# Patient Record
Sex: FEMALE | Race: WHITE | NOT HISPANIC OR LATINO | Employment: FULL TIME | URBAN - METROPOLITAN AREA
[De-identification: names, ages, dates, MRNs, and addresses within clinical notes are randomized per-mention and may not be internally consistent; named-entity substitution may affect disease eponyms.]

---

## 2017-11-28 ENCOUNTER — HOSPITAL ENCOUNTER (OUTPATIENT)
Dept: LAB | Facility: HOSPITAL | Age: 52
Discharge: HOME | End: 2017-11-28
Attending: NURSE PRACTITIONER | Admitting: NURSE PRACTITIONER

## 2017-11-28 LAB
ALBUMIN SERPL-MCNC: 4.2 G/DL (ref 3.5–4.8)
ALBUMIN/GLOB SERPL: 1.4 {RATIO} (ref 1–1.7)
ALP SERPL-CCNC: 87 IU/L (ref 32–91)
ALT SERPL-CCNC: 11 IU/L (ref 14–54)
ANION GAP SERPL CALC-SCNC: 11 MMOL/L (ref 10–20)
AST SERPL-CCNC: 15 IU/L (ref 15–41)
BASOPHILS # BLD AUTO: 0 10*3/UL (ref 0–0.2)
BASOPHILS NFR BLD AUTO: 1 % (ref 0–2)
BILIRUB SERPL-MCNC: 0.6 MG/DL (ref 0.3–1.2)
BUN SERPL-MCNC: 11 MG/DL (ref 8–20)
BUN/CREAT SERPL: 18.3 (ref 5.4–26.2)
CALCIUM SERPL-MCNC: 9.4 MG/DL (ref 8.9–10.3)
CHLORIDE SERPL-SCNC: 105 MMOL/L (ref 101–111)
CONV CO2: 29 MMOL/L (ref 22–32)
CONV TOTAL PROTEIN: 7.3 G/DL (ref 6.1–7.9)
CREAT UR-MCNC: 0.6 MG/DL (ref 0.4–1)
D DIMER PPP FEU-MCNC: 0.46 MG/L FEU (ref 0.17–0.59)
DIFFERENTIAL METHOD BLD: (no result)
EOSINOPHIL # BLD AUTO: 0.2 10*3/UL (ref 0–0.3)
EOSINOPHIL # BLD AUTO: 3 % (ref 0–3)
ERYTHROCYTE [DISTWIDTH] IN BLOOD BY AUTOMATED COUNT: 12.5 % (ref 11.5–14.5)
GLOBULIN UR ELPH-MCNC: 3.1 G/DL (ref 2.5–3.8)
GLUCOSE SERPL-MCNC: 99 MG/DL (ref 65–99)
HCT VFR BLD AUTO: 38.5 % (ref 35–49)
HGB BLD-MCNC: 13.2 G/DL (ref 12–15)
LYMPHOCYTES # BLD AUTO: 1.9 10*3/UL (ref 0.8–4.8)
LYMPHOCYTES NFR BLD AUTO: 26 % (ref 18–42)
MCH RBC QN AUTO: 30.8 PG (ref 26–32)
MCHC RBC AUTO-ENTMCNC: 34.2 G/DL (ref 32–36)
MCV RBC AUTO: 90.1 FL (ref 80–94)
MONOCYTES # BLD AUTO: 0.6 10*3/UL (ref 0.1–1.3)
MONOCYTES NFR BLD AUTO: 8 % (ref 2–11)
NEUTROPHILS # BLD AUTO: 4.6 10*3/UL (ref 2.3–8.6)
NEUTROPHILS NFR BLD AUTO: 62 % (ref 50–75)
NRBC BLD AUTO-RTO: 0 /100{WBCS}
NRBC/RBC NFR BLD MANUAL: 0 10*3/UL
PLATELET # BLD AUTO: 211 10*3/UL (ref 150–450)
PMV BLD AUTO: 7.8 FL (ref 7.4–10.4)
POTASSIUM SERPL-SCNC: 4 MMOL/L (ref 3.6–5.1)
RBC # BLD AUTO: 4.27 10*6/UL (ref 4–5.4)
SODIUM SERPL-SCNC: 141 MMOL/L (ref 136–144)
WBC # BLD AUTO: 7.3 10*3/UL (ref 4.5–11.5)

## 2018-10-16 ENCOUNTER — HOSPITAL ENCOUNTER (OUTPATIENT)
Dept: MAMMOGRAPHY | Facility: HOSPITAL | Age: 53
Discharge: HOME | End: 2018-10-16
Attending: NURSE PRACTITIONER | Admitting: NURSE PRACTITIONER

## 2018-11-02 ENCOUNTER — HOSPITAL ENCOUNTER (OUTPATIENT)
Dept: OTHER | Facility: HOSPITAL | Age: 53
Discharge: HOME | End: 2018-11-02
Attending: NURSE PRACTITIONER | Admitting: NURSE PRACTITIONER

## 2018-11-12 ENCOUNTER — HOSPITAL ENCOUNTER (OUTPATIENT)
Dept: MAMMOGRAPHY | Facility: HOSPITAL | Age: 53
Discharge: HOME | End: 2018-11-12
Attending: NURSE PRACTITIONER | Admitting: NURSE PRACTITIONER

## 2019-09-19 ENCOUNTER — TRANSCRIBE ORDERS (OUTPATIENT)
Dept: ADMINISTRATIVE | Facility: HOSPITAL | Age: 54
End: 2019-09-19

## 2019-09-19 DIAGNOSIS — R06.02 SHORTNESS OF BREATH: Primary | ICD-10-CM

## 2019-09-19 DIAGNOSIS — Z12.39 SCREENING BREAST EXAMINATION: Primary | ICD-10-CM

## 2019-10-16 ENCOUNTER — HOSPITAL ENCOUNTER (OUTPATIENT)
Dept: MAMMOGRAPHY | Facility: HOSPITAL | Age: 54
Discharge: HOME | End: 2019-10-16
Admitting: NURSE PRACTITIONER

## 2019-10-16 DIAGNOSIS — Z12.39 SCREENING BREAST EXAMINATION: ICD-10-CM

## 2019-10-16 PROCEDURE — 77067 SCR MAMMO BI INCL CAD: CPT

## 2019-10-16 PROCEDURE — 77063 BREAST TOMOSYNTHESIS BI: CPT

## 2020-09-16 NOTE — PROGRESS NOTES
Hematology/Oncology Outpatient Consultation    Patient name: Angy Davies  : 1965  MRN: [id number]  Primary Care Physician: Germán Spencer FNP  Referring Physician: Germán Spencer FNP  Reason For Consult:     Chief Complaint   Patient presents with   • Follow-up     consultation for breast cancer       History of Present Illness:    This a 55-year-old female who in 2018 was found to have stage I invasive ductal carcinoma of the right breast.  There underwent right lumpectomy followed by sentinel lymph node biopsy at [facility] on 2018.  Pathology showed grade 1 tubular carcinoma measuring 6.5 mm associated with some DCIS.  Margins were negative.  And lymph nodes were also negative pathologic stage was pT1b N0 M0.  Patient was then placed on adjuvant tamoxifen following radiation treatment.  She has been under the care of Dr. House.  Patient still that she developed progressive memory issues while on tamoxifen and had requested to have her treatments switched.  Review of Dr. House's note suggest that the patient did have hormonal levels for estradiol and FSH FSH was in the postmenopausal range but estradiol was still within perimenopausal range.  She was asked to continue tamoxifen with repeat hormonal levels 6 to 12 months.  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  She states she does not want to lose her job as she is fearful that tamoxifen effects will continue to be a problem.  She has been on tamoxifen since .  Her last mammogram was 2019.  She denies any breast lumps, nipple discharge or skin discoloration.  2/15/2019 she had a DEXA scan which showed osteopenia and patient is currently on calcium with vitamin D.  She also had intermittent abnormalities in her thyroid function testing.  Had a B12 level which was normal at 693    Past Medical History:   Diagnosis Date   • Breast cancer (CMS/HCC)    • Hx of  radiation therapy        Past Surgical History:   Procedure Laterality Date   • BREAST LUMPECTOMY     • HYSTERECTOMY           Current Outpatient Medications:   •  cyclobenzaprine (FLEXERIL) 10 MG tablet, TAKE ONE TABLET 2 TIMES A DAY AS NEEDED, Disp: , Rfl:   •  ferrous sulfate 324 (65 Fe) MG tablet delayed-release EC tablet, Take 324 mg by mouth Daily With Breakfast., Disp: , Rfl:   •  furosemide (LASIX) 40 MG tablet, Take 40 mg by mouth Daily., Disp: , Rfl:   •  lisinopril-hydrochlorothiazide (PRINZIDE,ZESTORETIC) 10-12.5 MG per tablet, Take 1 tablet by mouth Daily., Disp: , Rfl:   •  methylPREDNISolone (MEDROL) 4 MG dose pack, TAKE 6 TABLETS ON DAY 1 AS DIRECTED ON PACKAGE AND DECREASE BY 1 TAB EACH DAY FOR A TOTAL OF 6 DAYS, Disp: , Rfl:   •  naproxen (NAPROSYN) 500 MG tablet, Take 500 mg by mouth 2 (Two) Times a Day With Meals., Disp: , Rfl:   •  potassium chloride ER (K-TAB) 20 MEQ tablet controlled-release ER tablet, Take 20 mEq by mouth Daily. with food, Disp: , Rfl:   •  Specialty Vitamins Products (Menopause Relief) tablet, MENOPAUSE RELIEF ORAL TABLET, Disp: , Rfl:   •  tamoxifen (NOLVADEX) 20 MG chemo tablet, Take 20 mg by mouth Daily., Disp: , Rfl:   •  Viberzi 75 MG tablet, Take 1 tablet by mouth 2 (Two) Times a Day., Disp: , Rfl:     Not on File      There is no immunization history on file for this patient.    Family History   Problem Relation Age of Onset   • Breast cancer Maternal Grandmother        Cancer-related family history includes Breast cancer in her maternal grandmother.    Social History     Tobacco Use   • Smoking status: Former Smoker     Years: 2.00   • Smokeless tobacco: Never Used   Substance Use Topics   • Alcohol use: Yes     Comment: rarely   • Drug use: Never       ROS:    Review of Systems   Constitutional: Negative for chills and fever.   HENT: Negative for ear pain, mouth sores, nosebleeds and sore throat.    Eyes: Negative for photophobia and visual disturbance.  "  Respiratory: Negative for wheezing and stridor.    Cardiovascular: Negative for chest pain and palpitations.   Gastrointestinal: Negative for abdominal pain, diarrhea, nausea and vomiting.   Endocrine: Negative for cold intolerance and heat intolerance.   Genitourinary: Negative for dysuria and hematuria.   Musculoskeletal: Negative for joint swelling and neck stiffness.   Skin: Negative for color change and rash.   Neurological: Negative for seizures and syncope.   Hematological: Negative for adenopathy.        No obvious bleeding   Psychiatric/Behavioral: Negative for agitation, confusion and hallucinations.       Objective:    Vitals:    09/17/20 1340   BP: 114/81   Pulse: 84   Resp: 16   Temp: 97.3 °F (36.3 °C)   Weight: 79.4 kg (175 lb)   Height: 165.1 cm (65\")   PainSc:   6   PainLoc: Back     Body mass index is 29.12 kg/m².    ECOG  (0) Fully active, able to carry on all predisease performance without restriction    Physical Exam:  Physical Exam  Vitals signs and nursing note reviewed.   Constitutional:       General: She is not in acute distress.     Appearance: She is not diaphoretic.   HENT:      Head: Normocephalic and atraumatic.   Eyes:      General: No scleral icterus.        Right eye: No discharge.         Left eye: No discharge.      Conjunctiva/sclera: Conjunctivae normal.   Neck:      Musculoskeletal: Normal range of motion and neck supple.      Thyroid: No thyromegaly.   Cardiovascular:      Rate and Rhythm: Normal rate and regular rhythm.      Heart sounds: Normal heart sounds. No friction rub. No gallop.    Pulmonary:      Effort: Pulmonary effort is normal. No respiratory distress.      Breath sounds: No stridor. No wheezing.   Abdominal:      General: Bowel sounds are normal.      Palpations: Abdomen is soft. There is no mass.      Tenderness: There is no abdominal tenderness. There is no guarding or rebound.   Musculoskeletal: Normal range of motion.         General: No tenderness. "   Lymphadenopathy:      Cervical: No cervical adenopathy.   Skin:     General: Skin is warm.      Findings: No erythema or rash.   Neurological:      Mental Status: She is alert and oriented to person, place, and time.      Motor: No abnormal muscle tone.   Psychiatric:         Behavior: Behavior normal.         RECENT LABS  WBC   Date Value Ref Range Status   09/17/2020 7.06 3.40 - 10.80 10*3/mm3 Final     RBC   Date Value Ref Range Status   09/17/2020 3.79 3.77 - 5.28 10*6/mm3 Final     Hemoglobin   Date Value Ref Range Status   09/17/2020 12.2 12.0 - 15.9 g/dL Final     Hematocrit   Date Value Ref Range Status   09/17/2020 35.6 34.0 - 46.6 % Final     MCV   Date Value Ref Range Status   09/17/2020 93.9 79.0 - 97.0 fL Final     MCH   Date Value Ref Range Status   09/17/2020 32.2 26.6 - 33.0 pg Final     MCHC   Date Value Ref Range Status   09/17/2020 34.3 31.5 - 35.7 g/dL Final     RDW   Date Value Ref Range Status   09/17/2020 12.2 (L) 12.3 - 15.4 % Final     RDW-SD   Date Value Ref Range Status   09/17/2020 40.7 37.0 - 54.0 fl Final     MPV   Date Value Ref Range Status   09/17/2020 9.5 6.0 - 12.0 fL Final     Platelets   Date Value Ref Range Status   09/17/2020 221 140 - 450 10*3/mm3 Final     Neutrophil %   Date Value Ref Range Status   09/17/2020 69.2 42.7 - 76.0 % Final     Lymphocyte %   Date Value Ref Range Status   09/17/2020 22.9 19.6 - 45.3 % Final     Monocyte %   Date Value Ref Range Status   09/17/2020 6.2 5.0 - 12.0 % Final     Eosinophil %   Date Value Ref Range Status   09/17/2020 1.4 0.3 - 6.2 % Final     Basophil %   Date Value Ref Range Status   09/17/2020 0.3 0.0 - 1.5 % Final     Neutrophils, Absolute   Date Value Ref Range Status   09/17/2020 4.88 1.70 - 7.00 10*3/mm3 Final     Lymphocytes, Absolute   Date Value Ref Range Status   09/17/2020 1.62 0.70 - 3.10 10*3/mm3 Final     Monocytes, Absolute   Date Value Ref Range Status   09/17/2020 0.44 0.10 - 0.90 10*3/mm3 Final     Eosinophils,  Absolute   Date Value Ref Range Status   09/17/2020 0.10 0.00 - 0.40 10*3/mm3 Final     Basophils, Absolute   Date Value Ref Range Status   09/17/2020 0.02 0.00 - 0.20 10*3/mm3 Final     nRBC   Date Value Ref Range Status   08/02/2018 0 0 /100[WBCs] Final       Lab Results   Component Value Date    GLUCOSE 97 09/17/2020    BUN  09/17/2020      Comment:      Testing performed by alternate method    CREATININE 0.92 09/17/2020    EGFRIFNONA 63 09/17/2020    BCR  09/17/2020      Comment:      Testing not performed    K 4.0 09/17/2020    CO2 29.0 09/17/2020    CALCIUM 9.3 09/17/2020    ALBUMIN 4.50 09/17/2020    LABIL2 1.4 11/28/2017    AST 20 09/17/2020    ALT 16 09/17/2020           1. Invasive ductal carcinoma of the right breast status post right lumpectomy with sentinel lymph node biopsy ER positive ME positive HER-2/birdie negative T1b N0 M0.  Status post radiation therapy  2. On adjuvant endocrine therapy with tamoxifen  3. 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 Osteopenia last bone density February 2019      Plans    · Check estradiol, serum FSH, CBC and CMP  · Patient to hold tamoxifen for now  · Schedule bilateral diagnostic mammogram due October 2020  · Follow-up with me in 6 weeks to review results and make recommendations for either continuing anti-estrogen therapy with an aromatase inhibitor if completely postmenopausal  · Bone density is due February 2021      Patient verbalized understanding and is in agreement of the above plan.          Thank you very much for allowing me to participate in the care of Sneha, I will keep you updated on her progress        I spent 60 total minutes, face-to-face, caring for Angy today.  90% of this time involved counseling and/or coordination of care as documented within this note.

## 2020-09-17 ENCOUNTER — LAB (OUTPATIENT)
Dept: LAB | Facility: HOSPITAL | Age: 55
End: 2020-09-17

## 2020-09-17 ENCOUNTER — CONSULT (OUTPATIENT)
Dept: ONCOLOGY | Facility: CLINIC | Age: 55
End: 2020-09-17

## 2020-09-17 VITALS
TEMPERATURE: 97.3 F | RESPIRATION RATE: 16 BRPM | HEIGHT: 65 IN | DIASTOLIC BLOOD PRESSURE: 81 MMHG | WEIGHT: 175 LBS | SYSTOLIC BLOOD PRESSURE: 114 MMHG | HEART RATE: 84 BPM | BODY MASS INDEX: 29.16 KG/M2

## 2020-09-17 DIAGNOSIS — Z78.0 POST-MENOPAUSAL: ICD-10-CM

## 2020-09-17 DIAGNOSIS — Z78.0 POST-MENOPAUSAL: Primary | ICD-10-CM

## 2020-09-17 DIAGNOSIS — C50.919 MALIGNANT NEOPLASM OF FEMALE BREAST, UNSPECIFIED ESTROGEN RECEPTOR STATUS, UNSPECIFIED LATERALITY, UNSPECIFIED SITE OF BREAST (HCC): ICD-10-CM

## 2020-09-17 LAB
ALBUMIN SERPL-MCNC: 4.5 G/DL (ref 3.5–5.2)
ALBUMIN/GLOB SERPL: 1.6 G/DL
ALP SERPL-CCNC: 52 U/L (ref 39–117)
ALT SERPL W P-5'-P-CCNC: 16 U/L (ref 1–33)
ANION GAP SERPL CALCULATED.3IONS-SCNC: 10 MMOL/L (ref 5–15)
AST SERPL-CCNC: 20 U/L (ref 1–32)
BASOPHILS # BLD AUTO: 0.02 10*3/MM3 (ref 0–0.2)
BASOPHILS NFR BLD AUTO: 0.3 % (ref 0–1.5)
BILIRUB SERPL-MCNC: 0.2 MG/DL (ref 0–1.2)
BUN SERPL-MCNC: 20 MG/DL (ref 6–20)
BUN SERPL-MCNC: NORMAL MG/DL
BUN/CREAT SERPL: NORMAL
CALCIUM SPEC-SCNC: 9.3 MG/DL (ref 8.6–10.5)
CHLORIDE SERPL-SCNC: 100 MMOL/L (ref 98–107)
CO2 SERPL-SCNC: 29 MMOL/L (ref 22–29)
CREAT SERPL-MCNC: 0.92 MG/DL (ref 0.57–1)
DEPRECATED RDW RBC AUTO: 40.7 FL (ref 37–54)
EOSINOPHIL # BLD AUTO: 0.1 10*3/MM3 (ref 0–0.4)
EOSINOPHIL NFR BLD AUTO: 1.4 % (ref 0.3–6.2)
ERYTHROCYTE [DISTWIDTH] IN BLOOD BY AUTOMATED COUNT: 12.2 % (ref 12.3–15.4)
ESTRADIOL SERPL HS-MCNC: <5 PG/ML
FSH SERPL-ACNC: 53.5 MIU/ML
GFR SERPL CREATININE-BSD FRML MDRD: 63 ML/MIN/1.73
GLOBULIN UR ELPH-MCNC: 2.8 GM/DL
GLUCOSE SERPL-MCNC: 97 MG/DL (ref 65–99)
HCT VFR BLD AUTO: 35.6 % (ref 34–46.6)
HGB BLD-MCNC: 12.2 G/DL (ref 12–15.9)
LYMPHOCYTES # BLD AUTO: 1.62 10*3/MM3 (ref 0.7–3.1)
LYMPHOCYTES NFR BLD AUTO: 22.9 % (ref 19.6–45.3)
MCH RBC QN AUTO: 32.2 PG (ref 26.6–33)
MCHC RBC AUTO-ENTMCNC: 34.3 G/DL (ref 31.5–35.7)
MCV RBC AUTO: 93.9 FL (ref 79–97)
MONOCYTES # BLD AUTO: 0.44 10*3/MM3 (ref 0.1–0.9)
MONOCYTES NFR BLD AUTO: 6.2 % (ref 5–12)
NEUTROPHILS NFR BLD AUTO: 4.88 10*3/MM3 (ref 1.7–7)
NEUTROPHILS NFR BLD AUTO: 69.2 % (ref 42.7–76)
PLATELET # BLD AUTO: 221 10*3/MM3 (ref 140–450)
PMV BLD AUTO: 9.5 FL (ref 6–12)
POTASSIUM SERPL-SCNC: 4 MMOL/L (ref 3.5–5.2)
PROT SERPL-MCNC: 7.3 G/DL (ref 6–8.5)
RBC # BLD AUTO: 3.79 10*6/MM3 (ref 3.77–5.28)
SODIUM SERPL-SCNC: 139 MMOL/L (ref 136–145)
WBC # BLD AUTO: 7.06 10*3/MM3 (ref 3.4–10.8)

## 2020-09-17 PROCEDURE — 83001 ASSAY OF GONADOTROPIN (FSH): CPT

## 2020-09-17 PROCEDURE — 82670 ASSAY OF TOTAL ESTRADIOL: CPT

## 2020-09-17 PROCEDURE — 36415 COLL VENOUS BLD VENIPUNCTURE: CPT

## 2020-09-17 PROCEDURE — 80053 COMPREHEN METABOLIC PANEL: CPT

## 2020-09-17 PROCEDURE — 99205 OFFICE O/P NEW HI 60 MIN: CPT | Performed by: INTERNAL MEDICINE

## 2020-09-17 PROCEDURE — 85025 COMPLETE CBC W/AUTO DIFF WBC: CPT

## 2020-09-17 RX ORDER — POTASSIUM CHLORIDE 1500 MG/1
20 TABLET, FILM COATED, EXTENDED RELEASE ORAL DAILY
COMMUNITY
Start: 2020-09-05 | End: 2022-08-16 | Stop reason: HOSPADM

## 2020-09-17 RX ORDER — ELUXADOLINE 75 MG/1
1 TABLET, FILM COATED ORAL 2 TIMES DAILY
COMMUNITY
Start: 2020-09-09 | End: 2022-08-16 | Stop reason: HOSPADM

## 2020-09-17 RX ORDER — CYCLOBENZAPRINE HCL 10 MG
TABLET ORAL
COMMUNITY
Start: 2020-09-05

## 2020-09-17 RX ORDER — LISINOPRIL AND HYDROCHLOROTHIAZIDE 12.5; 1 MG/1; MG/1
1 TABLET ORAL DAILY
COMMUNITY
Start: 2020-09-11

## 2020-09-17 RX ORDER — METHYLPREDNISOLONE 4 MG/1
TABLET ORAL
COMMUNITY
Start: 2020-09-11 | End: 2022-08-16 | Stop reason: HOSPADM

## 2020-09-17 RX ORDER — FUROSEMIDE 40 MG/1
40 TABLET ORAL DAILY
COMMUNITY
Start: 2020-08-25 | End: 2022-08-16 | Stop reason: HOSPADM

## 2020-09-17 RX ORDER — FERROUS SULFATE TAB EC 324 MG (65 MG FE EQUIVALENT) 324 (65 FE) MG
324 TABLET DELAYED RESPONSE ORAL
COMMUNITY

## 2020-09-17 RX ORDER — NAPROXEN 500 MG/1
500 TABLET ORAL 2 TIMES DAILY WITH MEALS
COMMUNITY
Start: 2020-09-02 | End: 2022-08-16 | Stop reason: HOSPADM

## 2020-09-17 RX ORDER — TAMOXIFEN CITRATE 20 MG/1
20 TABLET ORAL DAILY
COMMUNITY
Start: 2020-07-06 | End: 2020-10-29

## 2020-09-29 ENCOUNTER — HOSPITAL ENCOUNTER (OUTPATIENT)
Dept: MAMMOGRAPHY | Facility: HOSPITAL | Age: 55
Discharge: HOME | End: 2020-09-29
Admitting: INTERNAL MEDICINE

## 2020-09-29 DIAGNOSIS — Z78.0 POST-MENOPAUSAL: ICD-10-CM

## 2020-09-29 PROCEDURE — G0279 TOMOSYNTHESIS, MAMMO: HCPCS

## 2020-09-29 PROCEDURE — 77066 DX MAMMO INCL CAD BI: CPT

## 2020-09-30 ENCOUNTER — HOSPITAL ENCOUNTER (OUTPATIENT)
Dept: BONE DENSITY | Facility: HOSPITAL | Age: 55
Discharge: HOME | End: 2020-09-30
Admitting: INTERNAL MEDICINE

## 2020-09-30 DIAGNOSIS — Z78.0 POST-MENOPAUSAL: ICD-10-CM

## 2020-09-30 PROCEDURE — 77080 DXA BONE DENSITY AXIAL: CPT

## 2020-10-28 NOTE — PROGRESS NOTES
Hematology/Oncology Outpatient Follow Up    PATIENT NAME:Angy Davies  :1965  MRN: [id number]  PRIMARY CARE PHYSICIAN: Germán Spencer FNP  REFERRING PHYSICIAN: Germán Spencer FNP    Chief Complaint   Patient presents with   • Follow-up     breast cancer        HISTORY OF PRESENT ILLNESS:     This a 55-year-old female who in 2018 was found to have stage I invasive ductal carcinoma of the right breast.  There underwent right lumpectomy followed by sentinel lymph node biopsy at [facility] on 2018.  Pathology showed grade 1 tubular carcinoma measuring 6.5 mm associated with some DCIS.  Margins were negative.  And lymph nodes were also negative pathologic stage was pT1b N0 M0.  Patient was then placed on adjuvant tamoxifen following radiation treatment.  She has been under the care of Dr. House.  Patient still that she developed progressive memory issues while on tamoxifen and had requested to have her treatments switched.  Review of Dr. House's note suggest that the patient did have hormonal levels for estradiol and FSH FSH was in the postmenopausal range but estradiol was still within perimenopausal range.  She was asked to continue tamoxifen with repeat hormonal levels 6 to 12 months.  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  She states she does not want to lose her job as she is fearful that tamoxifen effects will continue to be a problem.  She has been on tamoxifen since .  Her last mammogram was 2019.  She denies any breast lumps, nipple discharge or skin discoloration.  2/15/2019 she had a DEXA scan which showed osteopenia and patient is currently on calcium with vitamin D.  She also had intermittent abnormalities in her thyroid function testing.  Had a B12 level which was normal at 693    · 2020: Patient discontinued tamoxifen due to memory issues.  · 2020 patient had a chemistry panel which was  actually unremarkable.  Estradiol was less than 5 which is in the postmenopausal range and FSH was 53 which is also in the post menopausal range white count was 7, hemoglobin 12.2 and platelets are 221.  Differentials where 69% neutrophils, 32% lymphocytes, there was no monocytosis eosinophilia or basophilia  · 9/29/2020 she had bilateral diagnostic mammogram which showed dense breast tissue.  But no evidence of malignancy was seen.  Follow-up in 1 year was recommended.  · 9/29/2020 patient had bone density which showed osteopenia  Past Medical History:   Diagnosis Date   • Breast cancer (CMS/HCC)    • Hx of radiation therapy        Past Surgical History:   Procedure Laterality Date   • BREAST BIOPSY     • BREAST LUMPECTOMY     • HYSTERECTOMY           Current Outpatient Medications:   •  cyclobenzaprine (FLEXERIL) 10 MG tablet, TAKE ONE TABLET 2 TIMES A DAY AS NEEDED, Disp: , Rfl:   •  ferrous sulfate 324 (65 Fe) MG tablet delayed-release EC tablet, Take 324 mg by mouth Daily With Breakfast., Disp: , Rfl:   •  furosemide (LASIX) 40 MG tablet, Take 40 mg by mouth Daily., Disp: , Rfl:   •  lisinopril-hydrochlorothiazide (PRINZIDE,ZESTORETIC) 10-12.5 MG per tablet, Take 1 tablet by mouth Daily., Disp: , Rfl:   •  naproxen (NAPROSYN) 500 MG tablet, Take 500 mg by mouth 2 (Two) Times a Day With Meals., Disp: , Rfl:   •  potassium chloride ER (K-TAB) 20 MEQ tablet controlled-release ER tablet, Take 20 mEq by mouth Daily. with food, Disp: , Rfl:   •  Specialty Vitamins Products (Menopause Relief) tablet, MENOPAUSE RELIEF ORAL TABLET, Disp: , Rfl:   •  Viberzi 75 MG tablet, Take 1 tablet by mouth 2 (Two) Times a Day., Disp: , Rfl:   •  exemestane (AROMASIN) 25 MG chemo tablet, Take 1 tablet by mouth Daily., Disp: 30 tablet, Rfl: 6  •  methylPREDNISolone (MEDROL) 4 MG dose pack, TAKE 6 TABLETS ON DAY 1 AS DIRECTED ON PACKAGE AND DECREASE BY 1 TAB EACH DAY FOR A TOTAL OF 6 DAYS, Disp: , Rfl:     Allergies   Allergen  "Reactions   • Penicillins GI Intolerance       Family History   Problem Relation Age of Onset   • Breast cancer Maternal Grandmother    • Ovarian cancer Mother        Cancer-related family history includes Breast cancer in her maternal grandmother; Ovarian cancer in her mother.    Social History     Tobacco Use   • Smoking status: Former Smoker     Years: 2.00   • Smokeless tobacco: Never Used   Substance Use Topics   • Alcohol use: Yes     Comment: rarely   • Drug use: Never       HPI, ROS and PFSH have been reviewed and confirmed on 10/29/2020.     SUBJECTIVE:    Patient is here today for routine follow-up.  She does not have any new issues.  She denies fevers, chills, nausea or vomiting.          REVIEW OF SYSTEMS:  Review of Systems   Constitutional: Negative for chills and fever.   HENT: Negative for ear pain, mouth sores, nosebleeds and sore throat.    Eyes: Negative for photophobia and visual disturbance.   Respiratory: Negative for wheezing and stridor.    Cardiovascular: Negative for chest pain and palpitations.   Gastrointestinal: Negative for abdominal pain, diarrhea, nausea and vomiting.   Endocrine: Negative for cold intolerance and heat intolerance.   Genitourinary: Negative for dysuria and hematuria.   Musculoskeletal: Negative for joint swelling and neck stiffness.   Skin: Negative for color change and rash.   Neurological: Negative for seizures and syncope.   Hematological: Negative for adenopathy.        No obvious bleeding   Psychiatric/Behavioral: Negative for agitation, confusion and hallucinations.   I have reviewed and confirmed the accuracy of the ROS as documented by the MA/LPN/RN Juliette Harris MD      OBJECTIVE:    Vitals:    10/29/20 1509   BP: 107/72   Pulse: 93   Resp: 18   Temp: 96.9 °F (36.1 °C)   TempSrc: Temporal   Weight: 80.3 kg (177 lb)   Height: 165.1 cm (65\")   PainSc:   5   PainLoc: Comment: back     Body mass index is 29.45 kg/m².    ECOG  (0) Fully active, able to " carry on all predisease performance without restriction    Physical Exam  Vitals signs and nursing note reviewed.   Constitutional:       General: She is not in acute distress.     Appearance: She is not diaphoretic.   HENT:      Head: Normocephalic and atraumatic.   Eyes:      General: No scleral icterus.        Right eye: No discharge.         Left eye: No discharge.      Conjunctiva/sclera: Conjunctivae normal.   Neck:      Musculoskeletal: Normal range of motion and neck supple.      Thyroid: No thyromegaly.   Cardiovascular:      Rate and Rhythm: Normal rate and regular rhythm.      Heart sounds: Normal heart sounds. No friction rub. No gallop.    Pulmonary:      Effort: Pulmonary effort is normal. No respiratory distress.      Breath sounds: No stridor. No wheezing.   Abdominal:      General: Bowel sounds are normal.      Palpations: Abdomen is soft. There is no mass.      Tenderness: There is no abdominal tenderness. There is no guarding or rebound.   Musculoskeletal: Normal range of motion.         General: No tenderness.   Lymphadenopathy:      Cervical: No cervical adenopathy.   Skin:     General: Skin is warm.      Findings: No erythema or rash.   Neurological:      Mental Status: She is alert and oriented to person, place, and time.      Motor: No abnormal muscle tone.   Psychiatric:         Behavior: Behavior normal.       I have reexamined the patient and the results are consistent with the previously documented exam. Juliette Harris MD       RECENT LABS  WBC   Date Value Ref Range Status   10/29/2020 5.66 3.40 - 10.80 10*3/mm3 Final     RBC   Date Value Ref Range Status   10/29/2020 3.81 3.77 - 5.28 10*6/mm3 Final     Hemoglobin   Date Value Ref Range Status   10/29/2020 12.1 12.0 - 15.9 g/dL Final     Hematocrit   Date Value Ref Range Status   10/29/2020 35.6 34.0 - 46.6 % Final     MCV   Date Value Ref Range Status   10/29/2020 93.4 79.0 - 97.0 fL Final     MCH   Date Value Ref Range Status    10/29/2020 31.8 26.6 - 33.0 pg Final     MCHC   Date Value Ref Range Status   10/29/2020 34.0 31.5 - 35.7 g/dL Final     RDW   Date Value Ref Range Status   10/29/2020 12.2 (L) 12.3 - 15.4 % Final     RDW-SD   Date Value Ref Range Status   10/29/2020 40.1 37.0 - 54.0 fl Final     MPV   Date Value Ref Range Status   10/29/2020 9.5 6.0 - 12.0 fL Final     Platelets   Date Value Ref Range Status   10/29/2020 185 140 - 450 10*3/mm3 Final     Neutrophil %   Date Value Ref Range Status   10/29/2020 59.0 42.7 - 76.0 % Final     Lymphocyte %   Date Value Ref Range Status   10/29/2020 29.3 19.6 - 45.3 % Final     Monocyte %   Date Value Ref Range Status   10/29/2020 9.5 5.0 - 12.0 % Final     Eosinophil %   Date Value Ref Range Status   10/29/2020 1.8 0.3 - 6.2 % Final     Basophil %   Date Value Ref Range Status   10/29/2020 0.4 0.0 - 1.5 % Final     Neutrophils, Absolute   Date Value Ref Range Status   10/29/2020 3.34 1.70 - 7.00 10*3/mm3 Final     Lymphocytes, Absolute   Date Value Ref Range Status   10/29/2020 1.66 0.70 - 3.10 10*3/mm3 Final     Monocytes, Absolute   Date Value Ref Range Status   10/29/2020 0.54 0.10 - 0.90 10*3/mm3 Final     Eosinophils, Absolute   Date Value Ref Range Status   10/29/2020 0.10 0.00 - 0.40 10*3/mm3 Final     Basophils, Absolute   Date Value Ref Range Status   10/29/2020 0.02 0.00 - 0.20 10*3/mm3 Final     nRBC   Date Value Ref Range Status   08/02/2018 0 0 /100[WBCs] Final       Lab Results   Component Value Date    GLUCOSE 97 09/17/2020    BUN  09/17/2020      Comment:      Testing performed by alternate method    BUN 20 09/17/2020    CREATININE 0.92 09/17/2020    EGFRIFNONA 63 09/17/2020    BCR  09/17/2020      Comment:      Testing not performed    K 4.0 09/17/2020    CO2 29.0 09/17/2020    CALCIUM 9.3 09/17/2020    ALBUMIN 4.50 09/17/2020    LABIL2 1.4 11/28/2017    AST 20 09/17/2020    ALT 16 09/17/2020         Assessment/Plan     Malignant neoplasm of female breast, unspecified  estrogen receptor status, unspecified laterality, unspecified site of breast (CMS/[facility])  - CBC & Differential      ASSESSMENT:    1. Invasive ductal carcinoma of the right breast status post right lumpectomy with sentinel lymph node biopsy ER positive, OH positive, HER-2/birdie negative... T1b N0 M0.  Status post radiation therapy.  2. Was on adjuvant endocrine therapy with tamoxifen discontinued due to memory issues  3. 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 Patient is now postmenopausal  5. Osteopenia: Reviewed bone density    Discussion    She is now postmenopausal therefore recommended aromatase inhibitor.  I have chosen Aromasin for her due to musculoskeletal issues that she has.  We discussed the side effects, benefits of aromatase inhibitor.  Side effects discussed include but not limited to:    Side effects of aromatase inhibitors include risk of musculoskeletal side effect including arthralgia, myalgia, especially in patients who have underlying musculoskeletal disease such as osteoarthritis, hot flashes, mood changes. There is risk of vaginal dryness, dyspareunia and other sexual dysfunction. Other side effects include degree of cognitive symptoms compared to women not on endocrine therapy. There is possibility of fatigue, forgetfulness, poor sleep hygiene, osteoporosis, osteopenia, risk of fractures, cardiovascular disease and hypercholestolemia. There is also possibility of reactivation of ovarian function especially in women who were premenopausal at time of diagnosis and became amenorrheic while on chemotherapy. The duration of treatment is also for minimum of five years and possibly extending therapy in some women with higher risk features beyond five years. We also discussed that the AI have similar efficacy in the adjuvant setting. Therefore, no one AI is preferred over another. I will obtain a bone density at baseline if none has been done and every two years after that.  If there is evidence of osteopenia or osteoporosis, there will be recommendation for bisphosphonate therapy for the duration of aromatase inhibitor therapy and possibly longer depending on bone health status at completion of therapy. We also discussed that labs will be done with follow ups and lipid panel will be done on an annual basis.    She has osteopenia therefore recommended Prolia 60 mg subcu every 6 months.  Also discussed the side effects of Prolia to include but not limited to:    Side effects of  Prolia was discussed  and include  bone aches and pains, electrolyte abnormalities including hypophosphatemia, hypocalcemia, low magnesium.  There is also a risk of renal insufficiency, osteonecrosis of the jaw has been observed in clinical studies.  There is risk of peripheral edema, hypertension, dermatitis, nausea, vomiting, and mild hematologic problems including anemia, thrombocytopenia.  Discussed the need for her to have dental evaluation prior to initiating prolia.  Discussed also the need to notify us of any future dental procedures planned.  Also discussed the need to notify us for jaw pain at any point in time.       Plans     · Reviewed estradiol, serum FSH, CBC and CMP: Patient is now postmenopausal  · Aromasin 25 mg p.o. daily  · Schedule Prolia after dental work has been completed  · Bone density will be due September 30, 2022  · Continue [place] D twice a day  · Bilateral diagnostic mammogram due September 29, 2021  · Monthly breast self exams and call for lumps, nipple discharge, skin discoloration or breast pain  · Follow-up with me in 3 months   · All questions answered to the best of my abilities        Patient verbalized understanding and is in agreement of the above plan.            Thank you very much for allowing me to participate in the care of Sneha, I will keep you updated on her progress           I spent 40 total minutes, face-to-face, caring for Angy today.  90% of this time  involved counseling and/or coordination of care as documented within this note.

## 2020-10-29 ENCOUNTER — LAB (OUTPATIENT)
Dept: LAB | Facility: HOSPITAL | Age: 55
End: 2020-10-29

## 2020-10-29 ENCOUNTER — OFFICE VISIT (OUTPATIENT)
Dept: ONCOLOGY | Facility: CLINIC | Age: 55
End: 2020-10-29

## 2020-10-29 VITALS
HEART RATE: 93 BPM | RESPIRATION RATE: 18 BRPM | WEIGHT: 177 LBS | SYSTOLIC BLOOD PRESSURE: 107 MMHG | BODY MASS INDEX: 29.49 KG/M2 | TEMPERATURE: 96.9 F | HEIGHT: 65 IN | DIASTOLIC BLOOD PRESSURE: 72 MMHG

## 2020-10-29 DIAGNOSIS — C50.919 MALIGNANT NEOPLASM OF FEMALE BREAST, UNSPECIFIED ESTROGEN RECEPTOR STATUS, UNSPECIFIED LATERALITY, UNSPECIFIED SITE OF BREAST (HCC): Primary | ICD-10-CM

## 2020-10-29 DIAGNOSIS — C50.919 MALIGNANT NEOPLASM OF FEMALE BREAST, UNSPECIFIED ESTROGEN RECEPTOR STATUS, UNSPECIFIED LATERALITY, UNSPECIFIED SITE OF BREAST (HCC): ICD-10-CM

## 2020-10-29 LAB
BASOPHILS # BLD AUTO: 0.02 10*3/MM3 (ref 0–0.2)
BASOPHILS NFR BLD AUTO: 0.4 % (ref 0–1.5)
DEPRECATED RDW RBC AUTO: 40.1 FL (ref 37–54)
EOSINOPHIL # BLD AUTO: 0.1 10*3/MM3 (ref 0–0.4)
EOSINOPHIL NFR BLD AUTO: 1.8 % (ref 0.3–6.2)
ERYTHROCYTE [DISTWIDTH] IN BLOOD BY AUTOMATED COUNT: 12.2 % (ref 12.3–15.4)
HCT VFR BLD AUTO: 35.6 % (ref 34–46.6)
HGB BLD-MCNC: 12.1 G/DL (ref 12–15.9)
LYMPHOCYTES # BLD AUTO: 1.66 10*3/MM3 (ref 0.7–3.1)
LYMPHOCYTES NFR BLD AUTO: 29.3 % (ref 19.6–45.3)
MCH RBC QN AUTO: 31.8 PG (ref 26.6–33)
MCHC RBC AUTO-ENTMCNC: 34 G/DL (ref 31.5–35.7)
MCV RBC AUTO: 93.4 FL (ref 79–97)
MONOCYTES # BLD AUTO: 0.54 10*3/MM3 (ref 0.1–0.9)
MONOCYTES NFR BLD AUTO: 9.5 % (ref 5–12)
NEUTROPHILS NFR BLD AUTO: 3.34 10*3/MM3 (ref 1.7–7)
NEUTROPHILS NFR BLD AUTO: 59 % (ref 42.7–76)
PLATELET # BLD AUTO: 185 10*3/MM3 (ref 140–450)
PMV BLD AUTO: 9.5 FL (ref 6–12)
RBC # BLD AUTO: 3.81 10*6/MM3 (ref 3.77–5.28)
WBC # BLD AUTO: 5.66 10*3/MM3 (ref 3.4–10.8)

## 2020-10-29 PROCEDURE — 36415 COLL VENOUS BLD VENIPUNCTURE: CPT

## 2020-10-29 PROCEDURE — 99215 OFFICE O/P EST HI 40 MIN: CPT | Performed by: INTERNAL MEDICINE

## 2020-10-29 PROCEDURE — 85025 COMPLETE CBC W/AUTO DIFF WBC: CPT

## 2020-10-29 RX ORDER — EXEMESTANE 25 MG/1
25 TABLET ORAL DAILY
Qty: 30 TABLET | Refills: 6 | Status: CANCELLED | OUTPATIENT
Start: 2020-10-29

## 2020-10-29 RX ORDER — EXEMESTANE 25 MG/1
25 TABLET ORAL DAILY
Qty: 30 TABLET | Refills: 6 | Status: SHIPPED | OUTPATIENT
Start: 2020-10-29 | End: 2021-04-20

## 2020-11-03 ENCOUNTER — TELEPHONE (OUTPATIENT)
Dept: ONCOLOGY | Facility: CLINIC | Age: 55
End: 2020-11-03

## 2020-11-03 ENCOUNTER — TELEPHONE (OUTPATIENT)
Dept: ONCOLOGY | Facility: HOSPITAL | Age: 55
End: 2020-11-03

## 2020-11-03 NOTE — TELEPHONE ENCOUNTER
The patient came in to the office today and stated that the prescription that Dr. Harris had wanted her to start had not been sent to her pharmacy.  I let her know that they had been sent but there was an error in how they were sent. I let the patient know that I would call the pharmacy right away.  She voiced understanding.  I called the patient's pharmacy and left a message with the prescription details.

## 2020-11-05 ENCOUNTER — MEDICATION THERAPY MANAGEMENT (OUTPATIENT)
Dept: PHARMACY | Facility: HOSPITAL | Age: 55
End: 2020-11-05

## 2020-11-05 ENCOUNTER — TELEPHONE (OUTPATIENT)
Dept: ONCOLOGY | Facility: HOSPITAL | Age: 55
End: 2020-11-05

## 2020-11-05 NOTE — PROGRESS NOTES
Call from patient routed to me for extreme diarrhea after starting Aromasin yesterday. There is a low incidence of this SE, only reported in 4-10% of patients.  Returned pt call and she said she has had diarrhea approximately every 30 minutes since this morning. She is unaware of how much loperamide she is taken but she took 2 at onset and 2 tabs after second bout of diarrhea and then throughout the day.  Spoke with Libby Shabazz who recommends holding Aromasin for 1 week to see if diarrhea resolves.  Pt is agreeable to this and will call the office sooner if she continues to have uncontrolled diarrhea. Reviewed signs and symptoms of dehydration with patient.

## 2020-11-11 ENCOUNTER — TELEPHONE (OUTPATIENT)
Dept: ONCOLOGY | Facility: HOSPITAL | Age: 55
End: 2020-11-11

## 2020-11-11 DIAGNOSIS — R19.7 DIARRHEA, UNSPECIFIED TYPE: Primary | ICD-10-CM

## 2020-11-11 NOTE — TELEPHONE ENCOUNTER
Patient called in to let us know that she would  Not be able to take the medicine she was put on yesterday (aromasin). She states she is having severe diarrhea. She also wanted to let us know she is done with her dental work as they wanted to put her on another type of infusion. I notified Sandra pharmacist and Libby Shabazz NP.    Patient states currently having diarrhea today and was taking medication up until today.

## 2020-11-11 NOTE — TELEPHONE ENCOUNTER
Called patient back and left her a message. Per Libby Shabazz NP patient to hold drug, come in to get stool study kit and make sure she is taking full dose of immodium. Asked patient to call back.

## 2020-11-25 ENCOUNTER — TELEPHONE (OUTPATIENT)
Dept: ONCOLOGY | Facility: HOSPITAL | Age: 55
End: 2020-11-25

## 2020-11-25 ENCOUNTER — TELEPHONE (OUTPATIENT)
Dept: ONCOLOGY | Facility: CLINIC | Age: 55
End: 2020-11-25

## 2020-11-30 ENCOUNTER — TELEPHONE (OUTPATIENT)
Dept: ONCOLOGY | Facility: CLINIC | Age: 55
End: 2020-11-30

## 2020-11-30 NOTE — TELEPHONE ENCOUNTER
----- Message from Alma Hooper RN sent at 11/17/2020  4:05 PM EST -----  Can someone call this patient and see if we can get them in to do this ? Thanks  ----- Message -----  From: SYSTEM  Sent: 11/16/2020  12:20 AM EST  To: [facility]

## 2020-12-04 ENCOUNTER — HOSPITAL ENCOUNTER (OUTPATIENT)
Dept: ONCOLOGY | Facility: HOSPITAL | Age: 55
Setting detail: INFUSION SERIES
Discharge: HOME | End: 2020-12-04

## 2020-12-04 VITALS
WEIGHT: 175.5 LBS | RESPIRATION RATE: 18 BRPM | TEMPERATURE: 97.1 F | DIASTOLIC BLOOD PRESSURE: 87 MMHG | HEART RATE: 106 BPM | SYSTOLIC BLOOD PRESSURE: 123 MMHG | BODY MASS INDEX: 29.24 KG/M2 | HEIGHT: 65 IN

## 2020-12-04 DIAGNOSIS — C50.919 MALIGNANT NEOPLASM OF FEMALE BREAST, UNSPECIFIED ESTROGEN RECEPTOR STATUS, UNSPECIFIED LATERALITY, UNSPECIFIED SITE OF BREAST (HCC): Primary | ICD-10-CM

## 2020-12-04 LAB
ALBUMIN SERPL-MCNC: 4.3 G/DL (ref 3.5–5.2)
ALBUMIN/GLOB SERPL: 1.8 G/DL
ALP SERPL-CCNC: 76 U/L (ref 39–117)
ALT SERPL W P-5'-P-CCNC: 24 U/L (ref 1–33)
ANION GAP SERPL CALCULATED.3IONS-SCNC: 8 MMOL/L (ref 5–15)
AST SERPL-CCNC: 24 U/L (ref 1–32)
BASOPHILS # BLD AUTO: 0.02 10*3/MM3 (ref 0–0.2)
BASOPHILS NFR BLD AUTO: 0.4 % (ref 0–1.5)
BILIRUB SERPL-MCNC: 0.2 MG/DL (ref 0–1.2)
BUN SERPL-MCNC: 19 MG/DL (ref 6–20)
BUN/CREAT SERPL: 22.4 (ref 7–25)
CALCIUM SPEC-SCNC: 9.6 MG/DL (ref 8.6–10.5)
CHLORIDE SERPL-SCNC: 106 MMOL/L (ref 98–107)
CO2 SERPL-SCNC: 28 MMOL/L (ref 22–29)
CREAT SERPL-MCNC: 0.85 MG/DL (ref 0.57–1)
DEPRECATED RDW RBC AUTO: 41.2 FL (ref 37–54)
EOSINOPHIL # BLD AUTO: 0.11 10*3/MM3 (ref 0–0.4)
EOSINOPHIL NFR BLD AUTO: 2.2 % (ref 0.3–6.2)
ERYTHROCYTE [DISTWIDTH] IN BLOOD BY AUTOMATED COUNT: 12.1 % (ref 12.3–15.4)
GFR SERPL CREATININE-BSD FRML MDRD: 69 ML/MIN/1.73
GLOBULIN UR ELPH-MCNC: 2.4 GM/DL
GLUCOSE SERPL-MCNC: 78 MG/DL (ref 65–99)
HCT VFR BLD AUTO: 35.1 % (ref 34–46.6)
HGB BLD-MCNC: 11.7 G/DL (ref 12–15.9)
LYMPHOCYTES # BLD AUTO: 1.54 10*3/MM3 (ref 0.7–3.1)
LYMPHOCYTES NFR BLD AUTO: 31 % (ref 19.6–45.3)
MAGNESIUM SERPL-MCNC: 1.8 MG/DL (ref 1.6–2.6)
MCH RBC QN AUTO: 32 PG (ref 26.6–33)
MCHC RBC AUTO-ENTMCNC: 33.3 G/DL (ref 31.5–35.7)
MCV RBC AUTO: 95.9 FL (ref 79–97)
MONOCYTES # BLD AUTO: 0.55 10*3/MM3 (ref 0.1–0.9)
MONOCYTES NFR BLD AUTO: 11.1 % (ref 5–12)
NEUTROPHILS NFR BLD AUTO: 2.75 10*3/MM3 (ref 1.7–7)
NEUTROPHILS NFR BLD AUTO: 55.3 % (ref 42.7–76)
PHOSPHATE SERPL-MCNC: 3.4 MG/DL (ref 2.5–4.5)
PLATELET # BLD AUTO: 180 10*3/MM3 (ref 140–450)
PMV BLD AUTO: 10.1 FL (ref 6–12)
POTASSIUM SERPL-SCNC: 3.8 MMOL/L (ref 3.5–5.2)
PROT SERPL-MCNC: 6.7 G/DL (ref 6–8.5)
RBC # BLD AUTO: 3.66 10*6/MM3 (ref 3.77–5.28)
SODIUM SERPL-SCNC: 142 MMOL/L (ref 136–145)
WBC # BLD AUTO: 4.97 10*3/MM3 (ref 3.4–10.8)

## 2020-12-04 PROCEDURE — 80053 COMPREHEN METABOLIC PANEL: CPT | Performed by: INTERNAL MEDICINE

## 2020-12-04 PROCEDURE — 83735 ASSAY OF MAGNESIUM: CPT | Performed by: INTERNAL MEDICINE

## 2020-12-04 PROCEDURE — 85025 COMPLETE CBC W/AUTO DIFF WBC: CPT | Performed by: INTERNAL MEDICINE

## 2020-12-04 PROCEDURE — 96372 THER/PROPH/DIAG INJ SC/IM: CPT

## 2020-12-04 PROCEDURE — 25010000002 DENOSUMAB 60 MG/ML SOLUTION PREFILLED SYRINGE: Performed by: INTERNAL MEDICINE

## 2020-12-04 PROCEDURE — 36415 COLL VENOUS BLD VENIPUNCTURE: CPT

## 2020-12-04 PROCEDURE — 84100 ASSAY OF PHOSPHORUS: CPT | Performed by: INTERNAL MEDICINE

## 2020-12-04 RX ADMIN — DENOSUMAB 60 MG: 60 INJECTION SUBCUTANEOUS at 15:31

## 2020-12-04 NOTE — PROGRESS NOTES
Pt. Here at clinic for C1 Prolia injection  Pt. Has no complaints today  Labs drawn prior to treatment (CBC, CMP, MAG, PHOS)  Pt's last CMP done on 9/17/2020 Ca 9.3, Albumin 4.50,  Pt. Stated she is taking a Os-Arthur plus D3 daily, pt. Also stated she had dental clearance done prior to todays visit which consisted of a cleaning and two cavities filled.   Prolia given as ordered per treatment plan  Pt. Tolerated treatment well. Pt. Discharged from clinic with no complaints and AVS given.

## 2021-01-13 ENCOUNTER — TELEPHONE (OUTPATIENT)
Dept: ONCOLOGY | Facility: CLINIC | Age: 56
End: 2021-01-13

## 2021-01-13 NOTE — TELEPHONE ENCOUNTER
Caller: PT    Relationship to patient: PT    Best call back number: [phone]    PT TESTED POSITIVE FOR COVID.  PLEASE CANCEL 1/28 APPT AND PT WILL RETURN CALL TO RESCHED.

## 2021-02-19 ENCOUNTER — TELEPHONE (OUTPATIENT)
Dept: ONCOLOGY | Facility: CLINIC | Age: 56
End: 2021-02-19

## 2021-02-19 NOTE — TELEPHONE ENCOUNTER
Caller: Angy    Relationship to patient: Pt    Best call back number: [phone]     Type of visit: Lab and follow up     Requested date: Sometime in March, 2:50pm or later     If rescheduling, when is the original appointment: 01/28

## 2021-02-22 ENCOUNTER — TELEPHONE (OUTPATIENT)
Dept: ONCOLOGY | Facility: CLINIC | Age: 56
End: 2021-02-22

## 2021-02-23 ENCOUNTER — TELEPHONE (OUTPATIENT)
Dept: ONCOLOGY | Facility: CLINIC | Age: 56
End: 2021-02-23

## 2021-02-23 NOTE — TELEPHONE ENCOUNTER
Caller: PT    Relationship to patient: PT    Best call back number: [phone]  TXT MSG OK    PT WAS CALLED TO SCHED IN MARCH.  PT RETURNING CALL.    ATTEMPTED WT NO ANSWER.    PLEASE RETURN CALL    THANK YOU

## 2021-02-25 ENCOUNTER — TELEPHONE (OUTPATIENT)
Dept: ONCOLOGY | Facility: CLINIC | Age: 56
End: 2021-02-25

## 2021-03-02 ENCOUNTER — TELEPHONE (OUTPATIENT)
Dept: ONCOLOGY | Facility: CLINIC | Age: 56
End: 2021-03-02

## 2021-03-02 NOTE — TELEPHONE ENCOUNTER
HUB UNABLE TO WARM TRANSFER    Caller: RENETTA LOZANO    Relationship to patient: SELF    Best call back number: [phone]    Patient is needing: SHE IS OFF TODAY AND WAS WONDERING IF SHE COULD SET UP HER F/U APPT WITH DR. LAGUERRE FOR TODAY.

## 2021-03-11 ENCOUNTER — TELEPHONE (OUTPATIENT)
Dept: ONCOLOGY | Facility: CLINIC | Age: 56
End: 2021-03-11

## 2021-03-11 NOTE — TELEPHONE ENCOUNTER
Caller: daniele    Relationship to patient: self    Best call back number: [phone]     Pt would like to resched cancelled appt on 1/28. Pt states any day will do as long as it is after 2:30pm. Unable to resched active treatment pt.

## 2021-04-20 ENCOUNTER — OFFICE VISIT (OUTPATIENT)
Dept: ONCOLOGY | Facility: CLINIC | Age: 56
End: 2021-04-20

## 2021-04-20 ENCOUNTER — LAB (OUTPATIENT)
Dept: LAB | Facility: HOSPITAL | Age: 56
End: 2021-04-20

## 2021-04-20 VITALS
TEMPERATURE: 97.8 F | SYSTOLIC BLOOD PRESSURE: 116 MMHG | WEIGHT: 168.6 LBS | HEIGHT: 65 IN | DIASTOLIC BLOOD PRESSURE: 81 MMHG | BODY MASS INDEX: 28.09 KG/M2 | RESPIRATION RATE: 18 BRPM | HEART RATE: 90 BPM

## 2021-04-20 DIAGNOSIS — C50.919 MALIGNANT NEOPLASM OF FEMALE BREAST, UNSPECIFIED ESTROGEN RECEPTOR STATUS, UNSPECIFIED LATERALITY, UNSPECIFIED SITE OF BREAST (HCC): Primary | ICD-10-CM

## 2021-04-20 DIAGNOSIS — C50.919 MALIGNANT NEOPLASM OF FEMALE BREAST, UNSPECIFIED ESTROGEN RECEPTOR STATUS, UNSPECIFIED LATERALITY, UNSPECIFIED SITE OF BREAST (HCC): ICD-10-CM

## 2021-04-20 PROBLEM — J44.9 CHRONIC OBSTRUCTIVE PULMONARY DISEASE (HCC): Status: ACTIVE | Noted: 2021-04-20

## 2021-04-20 PROBLEM — J45.909 ASTHMA: Status: ACTIVE | Noted: 2021-04-20

## 2021-04-20 PROBLEM — G43.909 HEADACHE, MIGRAINE: Status: ACTIVE | Noted: 2021-04-20

## 2021-04-20 PROBLEM — M85.80 OSTEOPENIA: Status: ACTIVE | Noted: 2019-02-22

## 2021-04-20 PROBLEM — F32.A DEPRESSION: Status: ACTIVE | Noted: 2021-04-20

## 2021-04-20 LAB
ALBUMIN SERPL-MCNC: 4.4 G/DL (ref 3.5–5.2)
ALBUMIN/GLOB SERPL: 1.5 G/DL
ALP SERPL-CCNC: 39 U/L (ref 39–117)
ALT SERPL W P-5'-P-CCNC: 8 U/L (ref 1–33)
ANION GAP SERPL CALCULATED.3IONS-SCNC: 9 MMOL/L (ref 5–15)
AST SERPL-CCNC: 19 U/L (ref 1–32)
BASOPHILS # BLD AUTO: 0.02 10*3/MM3 (ref 0–0.2)
BASOPHILS NFR BLD AUTO: 0.3 % (ref 0–1.5)
BILIRUB SERPL-MCNC: 0.3 MG/DL (ref 0–1.2)
BUN SERPL-MCNC: 23 MG/DL (ref 6–20)
BUN/CREAT SERPL: 22.8 (ref 7–25)
CALCIUM SPEC-SCNC: 8.8 MG/DL (ref 8.6–10.5)
CHLORIDE SERPL-SCNC: 102 MMOL/L (ref 98–107)
CO2 SERPL-SCNC: 27 MMOL/L (ref 22–29)
CREAT SERPL-MCNC: 1.01 MG/DL (ref 0.57–1)
DEPRECATED RDW RBC AUTO: 42.3 FL (ref 37–54)
EOSINOPHIL # BLD AUTO: 0.12 10*3/MM3 (ref 0–0.4)
EOSINOPHIL NFR BLD AUTO: 2 % (ref 0.3–6.2)
ERYTHROCYTE [DISTWIDTH] IN BLOOD BY AUTOMATED COUNT: 12.5 % (ref 12.3–15.4)
GFR SERPL CREATININE-BSD FRML MDRD: 57 ML/MIN/1.73
GLOBULIN UR ELPH-MCNC: 3 GM/DL
GLUCOSE SERPL-MCNC: 108 MG/DL (ref 65–99)
HCT VFR BLD AUTO: 35.5 % (ref 34–46.6)
HGB BLD-MCNC: 11.8 G/DL (ref 12–15.9)
LYMPHOCYTES # BLD AUTO: 1.71 10*3/MM3 (ref 0.7–3.1)
LYMPHOCYTES NFR BLD AUTO: 29.1 % (ref 19.6–45.3)
MCH RBC QN AUTO: 31.9 PG (ref 26.6–33)
MCHC RBC AUTO-ENTMCNC: 33.2 G/DL (ref 31.5–35.7)
MCV RBC AUTO: 95.9 FL (ref 79–97)
MONOCYTES # BLD AUTO: 0.52 10*3/MM3 (ref 0.1–0.9)
MONOCYTES NFR BLD AUTO: 8.9 % (ref 5–12)
NEUTROPHILS NFR BLD AUTO: 3.5 10*3/MM3 (ref 1.7–7)
NEUTROPHILS NFR BLD AUTO: 59.7 % (ref 42.7–76)
PLATELET # BLD AUTO: 168 10*3/MM3 (ref 140–450)
PMV BLD AUTO: 9.6 FL (ref 6–12)
POTASSIUM SERPL-SCNC: 3.7 MMOL/L (ref 3.5–5.2)
PROT SERPL-MCNC: 7.4 G/DL (ref 6–8.5)
RBC # BLD AUTO: 3.7 10*6/MM3 (ref 3.77–5.28)
SODIUM SERPL-SCNC: 138 MMOL/L (ref 136–145)
WBC # BLD AUTO: 5.87 10*3/MM3 (ref 3.4–10.8)

## 2021-04-20 PROCEDURE — 80053 COMPREHEN METABOLIC PANEL: CPT | Performed by: INTERNAL MEDICINE

## 2021-04-20 PROCEDURE — 36415 COLL VENOUS BLD VENIPUNCTURE: CPT

## 2021-04-20 PROCEDURE — 85025 COMPLETE CBC W/AUTO DIFF WBC: CPT

## 2021-04-20 PROCEDURE — 99214 OFFICE O/P EST MOD 30 MIN: CPT | Performed by: INTERNAL MEDICINE

## 2021-04-20 RX ORDER — NAPROXEN 500 MG/1
500 TABLET, DELAYED RELEASE ORAL 2 TIMES DAILY WITH MEALS
COMMUNITY
Start: 2021-04-09 | End: 2022-08-16 | Stop reason: HOSPADM

## 2021-04-20 RX ORDER — LETROZOLE 2.5 MG/1
2.5 TABLET, FILM COATED ORAL DAILY
Qty: 30 TABLET | Refills: 6 | Status: SHIPPED | OUTPATIENT
Start: 2021-04-20 | End: 2021-10-07

## 2021-04-20 RX ORDER — SUMATRIPTAN 50 MG/1
TABLET, FILM COATED ORAL
COMMUNITY
Start: 2021-01-11 | End: 2022-08-16 | Stop reason: HOSPADM

## 2021-04-20 NOTE — PROGRESS NOTES
Hematology/Oncology Outpatient Follow Up    PATIENT NAME:Angy Davies  :1965  MRN: [id number]  PRIMARY CARE PHYSICIAN: Germán Spencer FNP  REFERRING PHYSICIAN: Germán Spencer FNP    Chief Complaint   Patient presents with   • Appointment     Malignant neoplasm of female breast, unspecified estrogen receptor status, unspecified laterality, unspecified site of breast (CMS/HCC)   • Follow-up        HISTORY OF PRESENT ILLNESS:     This a 55-year-old female who in 2018 was found to have stage I invasive ductal carcinoma of the right breast.  There underwent right lumpectomy followed by sentinel lymph node biopsy at [facility] on 2018.  Pathology showed grade 1 tubular carcinoma measuring 6.5 mm associated with some DCIS.  Margins were negative.  And lymph nodes were also negative pathologic stage was pT1b N0 M0.  Patient was then placed on adjuvant tamoxifen following radiation treatment.  She has been under the care of Dr. House.  Patient still that she developed progressive memory issues while on tamoxifen and had requested to have her treatments switched.  Review of Dr. House's note suggest that the patient did have hormonal levels for estradiol and FSH FSH was in the postmenopausal range but estradiol was still within perimenopausal range.  She was asked to continue tamoxifen with repeat hormonal levels 6 to 12 months.  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  She states she does not want to lose her job as she is fearful that tamoxifen effects will continue to be a problem.  She has been on tamoxifen since .  Her last mammogram was 2019.  She denies any breast lumps, nipple discharge or skin discoloration.  2/15/2019 she had a DEXA scan which showed osteopenia and patient is currently on calcium with vitamin D.  She also had intermittent abnormalities in her thyroid function testing.  Had a B12 level which  was normal at 693    · 9/17/2020: Patient discontinued tamoxifen due to memory issues.  · 9/17/2020 patient had a chemistry panel which was actually unremarkable.  Estradiol was less than 5 which is in the postmenopausal range and FSH was 53 which is also in the post menopausal range white count was 7, hemoglobin 12.2 and platelets are 221.  Differentials where 69% neutrophils, 32% lymphocytes, there was no monocytosis eosinophilia or basophilia  · 9/29/2020 she had bilateral diagnostic mammogram which showed dense breast tissue.  But no evidence of malignancy was seen.  Follow-up in 1 year was recommended.  · 9/29/2020 patient had bone density which showed osteopenia  · Patient discontinued Aromasin due to diarrhea  Past Medical History:   Diagnosis Date   • Breast cancer (CMS/HCC)    • Hx of radiation therapy        Past Surgical History:   Procedure Laterality Date   • BREAST BIOPSY     • BREAST LUMPECTOMY     • HYSTERECTOMY           Current Outpatient Medications:   •  Calcium Carb-Cholecalciferol (Os-Arthur Calcium + D3) 500-200 MG-UNIT tablet, Take  by mouth., Disp: , Rfl:   •  cyclobenzaprine (FLEXERIL) 10 MG tablet, TAKE ONE TABLET 2 TIMES A DAY AS NEEDED, Disp: , Rfl:   •  EC-Naproxen 500 MG EC tablet, Take 500 mg by mouth 2 (Two) Times a Day With Meals., Disp: , Rfl:   •  ferrous sulfate 324 (65 Fe) MG tablet delayed-release EC tablet, Take 324 mg by mouth Daily With Breakfast., Disp: , Rfl:   •  furosemide (LASIX) 40 MG tablet, Take 40 mg by mouth Daily., Disp: , Rfl:   •  lisinopril-hydrochlorothiazide (PRINZIDE,ZESTORETIC) 10-12.5 MG per tablet, Take 1 tablet by mouth Daily., Disp: , Rfl:   •  methylPREDNISolone (MEDROL) 4 MG dose pack, TAKE 6 TABLETS ON DAY 1 AS DIRECTED ON PACKAGE AND DECREASE BY 1 TAB EACH DAY FOR A TOTAL OF 6 DAYS, Disp: , Rfl:   •  naproxen (NAPROSYN) 500 MG tablet, Take 500 mg by mouth 2 (Two) Times a Day With Meals., Disp: , Rfl:   •  potassium chloride ER (K-TAB) 20 MEQ tablet  controlled-release ER tablet, Take 20 mEq by mouth Daily. with food, Disp: , Rfl:   •  Specialty Vitamins Products (Menopause Relief) tablet, MENOPAUSE RELIEF ORAL TABLET, Disp: , Rfl:   •  SUMAtriptan (IMITREX) 50 MG tablet, PLEASE SEE ATTACHED FOR DETAILED DIRECTIONS, Disp: , Rfl:   •  Viberzi 75 MG tablet, Take 1 tablet by mouth 2 (Two) Times a Day., Disp: , Rfl:   •  letrozole (FEMARA) 2.5 MG tablet, Take 1 tablet by mouth Daily., Disp: 30 tablet, Rfl: 6    Allergies   Allergen Reactions   • Penicillins GI Intolerance       Family History   Problem Relation Age of Onset   • Breast cancer Maternal Grandmother    • Ovarian cancer Mother        Cancer-related family history includes Breast cancer in her maternal grandmother; Ovarian cancer in her mother.    Social History     Tobacco Use   • Smoking status: Former Smoker     Years: 2.00   • Smokeless tobacco: Never Used   Substance Use Topics   • Alcohol use: Yes     Comment: rarely   • Drug use: Never       HPI, ROS and PFSH have been reviewed and confirmed on 4/20/2021.     SUBJECTIVE:    Patient is here today for routine follow-up.  She does not have any new issues.  She denies fevers, chills, nausea or vomiting.  Otherwise she feels well          REVIEW OF SYSTEMS:  Review of Systems   Constitutional: Negative for chills and fever.   HENT: Negative for ear pain, mouth sores, nosebleeds and sore throat.    Eyes: Negative for photophobia and visual disturbance.   Respiratory: Negative for wheezing and stridor.    Cardiovascular: Negative for chest pain and palpitations.   Gastrointestinal: Negative for abdominal pain, diarrhea, nausea and vomiting.   Endocrine: Negative for cold intolerance and heat intolerance.   Genitourinary: Negative for dysuria and hematuria.   Musculoskeletal: Negative for joint swelling and neck stiffness.   Skin: Negative for color change and rash.   Neurological: Negative for seizures and syncope.   Hematological: Negative for  "adenopathy.        No obvious bleeding   Psychiatric/Behavioral: Negative for agitation, confusion and hallucinations.   I have reviewed and confirmed the accuracy of the ROS as documented by the MA/LPN/RN Juliette Harris MD      OBJECTIVE:    Vitals:    04/20/21 1442   BP: 116/81   Pulse: 90   Resp: 18   Temp: 97.8 °F (36.6 °C)   Weight: 76.5 kg (168 lb 9.6 oz)   Height: 165.1 cm (65\")   PainSc:   5   PainLoc: Hand     Body mass index is 28.06 kg/m².    ECOG  (0) Fully active, able to carry on all predisease performance without restriction    Physical Exam  Vitals and nursing note reviewed.   Constitutional:       General: She is not in acute distress.     Appearance: She is not diaphoretic.   HENT:      Head: Normocephalic and atraumatic.   Eyes:      General: No scleral icterus.        Right eye: No discharge.         Left eye: No discharge.      Conjunctiva/sclera: Conjunctivae normal.   Neck:      Thyroid: No thyromegaly.   Cardiovascular:      Rate and Rhythm: Normal rate and regular rhythm.      Heart sounds: Normal heart sounds. No friction rub. No gallop.    Pulmonary:      Effort: Pulmonary effort is normal. No respiratory distress.      Breath sounds: No stridor. No wheezing.   Abdominal:      General: Bowel sounds are normal.      Palpations: Abdomen is soft. There is no mass.      Tenderness: There is no abdominal tenderness. There is no guarding or rebound.   Musculoskeletal:         General: No tenderness. Normal range of motion.      Cervical back: Normal range of motion and neck supple.   Lymphadenopathy:      Cervical: No cervical adenopathy.   Skin:     General: Skin is warm.      Findings: No erythema or rash.   Neurological:      Mental Status: She is alert and oriented to person, place, and time.      Motor: No abnormal muscle tone.   Psychiatric:         Behavior: Behavior normal.       I have reexamined the patient and the results are consistent with the previously documented exam. " Juliette Kelsey Harris MD       RECENT LABS  WBC   Date Value Ref Range Status   04/20/2021 5.87 3.40 - 10.80 10*3/mm3 Final     RBC   Date Value Ref Range Status   04/20/2021 3.70 (L) 3.77 - 5.28 10*6/mm3 Final     Hemoglobin   Date Value Ref Range Status   04/20/2021 11.8 (L) 12.0 - 15.9 g/dL Final     Hematocrit   Date Value Ref Range Status   04/20/2021 35.5 34.0 - 46.6 % Final     MCV   Date Value Ref Range Status   04/20/2021 95.9 79.0 - 97.0 fL Final     MCH   Date Value Ref Range Status   04/20/2021 31.9 26.6 - 33.0 pg Final     MCHC   Date Value Ref Range Status   04/20/2021 33.2 31.5 - 35.7 g/dL Final     RDW   Date Value Ref Range Status   04/20/2021 12.5 12.3 - 15.4 % Final     RDW-SD   Date Value Ref Range Status   04/20/2021 42.3 37.0 - 54.0 fl Final     MPV   Date Value Ref Range Status   04/20/2021 9.6 6.0 - 12.0 fL Final     Platelets   Date Value Ref Range Status   04/20/2021 168 140 - 450 10*3/mm3 Final     Neutrophil %   Date Value Ref Range Status   04/20/2021 59.7 42.7 - 76.0 % Final     Lymphocyte %   Date Value Ref Range Status   04/20/2021 29.1 19.6 - 45.3 % Final     Monocyte %   Date Value Ref Range Status   04/20/2021 8.9 5.0 - 12.0 % Final     Eosinophil %   Date Value Ref Range Status   04/20/2021 2.0 0.3 - 6.2 % Final     Basophil %   Date Value Ref Range Status   04/20/2021 0.3 0.0 - 1.5 % Final     Neutrophils, Absolute   Date Value Ref Range Status   04/20/2021 3.50 1.70 - 7.00 10*3/mm3 Final     Lymphocytes, Absolute   Date Value Ref Range Status   04/20/2021 1.71 0.70 - 3.10 10*3/mm3 Final     Monocytes, Absolute   Date Value Ref Range Status   04/20/2021 0.52 0.10 - 0.90 10*3/mm3 Final     Eosinophils, Absolute   Date Value Ref Range Status   04/20/2021 0.12 0.00 - 0.40 10*3/mm3 Final     Basophils, Absolute   Date Value Ref Range Status   04/20/2021 0.02 0.00 - 0.20 10*3/mm3 Final     nRBC   Date Value Ref Range Status   08/02/2018 0 0 /100[WBCs] Final       Lab Results    Component Value Date    GLUCOSE 78 12/04/2020    BUN 19 12/04/2020    CREATININE 0.85 12/04/2020    EGFRIFNONA 69 12/04/2020    BCR 22.4 12/04/2020    K 3.8 12/04/2020    CO2 28.0 12/04/2020    CALCIUM 9.6 12/04/2020    ALBUMIN 4.30 12/04/2020    LABIL2 1.4 11/28/2017    AST 24 12/04/2020    ALT 24 12/04/2020         Assessment/Plan     Malignant neoplasm of female breast, unspecified estrogen receptor status, unspecified laterality, unspecified site of breast (CMS/[facility])  - CBC & Differential  - Comprehensive Metabolic Panel  - Comprehensive Metabolic Panel      ASSESSMENT:    1. Invasive ductal carcinoma of the right breast status post right lumpectomy with sentinel lymph node biopsy ER positive, AR positive, HER-2/birdie negative... T1b N0 M0.  Status post radiation therapy.  2. Was on adjuvant endocrine therapy with tamoxifen discontinued due to memory issues  3. Could  not tolerate Aromasin due to diarrhea  4. Will begin Femara 2.5 mg April 20, 2021  5. 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 Patient is now postmenopausal  7. Osteopenia: Reviewed bone density: On Prolia    Discussion    She is now postmenopausal therefore recommended aromatase inhibitor.  I have chosen Aromasin for her due to musculoskeletal issues that she has.  We discussed the side effects, benefits of aromatase inhibitor.  Side effects discussed include but not limited to:    Side effects of aromatase inhibitors include risk of musculoskeletal side effect including arthralgia, myalgia, especially in patients who have underlying musculoskeletal disease such as osteoarthritis, hot flashes, mood changes. There is risk of vaginal dryness, dyspareunia and other sexual dysfunction. Other side effects include degree of cognitive symptoms compared to women not on endocrine therapy. There is possibility of fatigue, forgetfulness, poor sleep hygiene, osteoporosis, osteopenia, risk of fractures, cardiovascular disease and  hypercholestolemia. There is also possibility of reactivation of ovarian function especially in women who were premenopausal at time of diagnosis and became amenorrheic while on chemotherapy. The duration of treatment is also for minimum of five years and possibly extending therapy in some women with higher risk features beyond five years. We also discussed that the AI have similar efficacy in the adjuvant setting. Therefore, no one AI is preferred over another. I will obtain a bone density at baseline if none has been done and every two years after that. If there is evidence of osteopenia or osteoporosis, there will be recommendation for bisphosphonate therapy for the duration of aromatase inhibitor therapy and possibly longer depending on bone health status at completion of therapy. We also discussed that labs will be done with follow ups and lipid panel will be done on an annual basis.    She has osteopenia therefore recommended Prolia 60 mg subcu every 6 months.  Also discussed the side effects of Prolia to include but not limited to:    Side effects of  Prolia was discussed  and include  bone aches and pains, electrolyte abnormalities including hypophosphatemia, hypocalcemia, low magnesium.  There is also a risk of renal insufficiency, osteonecrosis of the jaw has been observed in clinical studies.  There is risk of peripheral edema, hypertension, dermatitis, nausea, vomiting, and mild hematologic problems including anemia, thrombocytopenia.  Discussed the need for her to have dental evaluation prior to initiating prolia.  Discussed also the need to notify us of any future dental procedures planned.  Also discussed the need to notify us for jaw pain at any point in time.       Plans     · Discontinue Aromasin  · Begin Femara 2.5 mg p.o. daily  · Reviewed estradiol, serum FSH, CBC and CMP: Patient is now postmenopausal  · Continue Prolia Prolia and scheduled June 2021  · Bone density will be due September 30,  2022  · Continue Os-Arthur D twice a day  · Bilateral diagnostic mammogram due September 29, 2021: We will schedule at the next visit  · Monthly breast self exams and call for lumps, nipple discharge, skin discoloration or breast pain  · Follow-up with me in 3 months or earlier as needed for problems  · Note given for work adjustments due to arthritic symptoms in her finger joints  · All questions answered to the best of my abilities        Patient verbalized understanding and is in agreement of the above plan.            Thank you very much for allowing me to participate in the care of Sneha, I will keep you updated on her progress           I spent 30 total minutes, face-to-face, caring for Angy today.  90% of this time involved counseling and/or coordination of care as documented within this note.

## 2021-04-21 ENCOUNTER — TELEPHONE (OUTPATIENT)
Dept: ONCOLOGY | Facility: CLINIC | Age: 56
End: 2021-04-21

## 2021-04-21 ENCOUNTER — TELEPHONE (OUTPATIENT)
Dept: ONCOLOGY | Facility: HOSPITAL | Age: 56
End: 2021-04-21

## 2021-04-21 NOTE — TELEPHONE ENCOUNTER
Caller: KEYLA    Relationship: PT'S EMPLOYER    Best call back number: [phone]    What is the best time to reach you: BEFORE 5PM    Who are you requesting to speak with (clinical staff, provider,  specific staff member): KASEY DA SILVA RN    Do you know the name of the person who called: KASEY DA SILVA RN    What was the call regarding: JOB DESCRIPTION    Do you require a callback: YES

## 2021-04-21 NOTE — TELEPHONE ENCOUNTER
Received message asking for clarification regarding work restrictions. 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 Pt states she told MD about using side doors and wires at work. She states side doors are causing her hands to swell and that Dr Harris advised against using side doors and wires. Pt asked me to call Misa to clarify restrictions. I called Misa, but no answer. Left message clarifying the work restrictions.

## 2021-04-21 NOTE — TELEPHONE ENCOUNTER
Caller: KEYLA    Relationship:  WITH [employer] (PT EMPLOYMENT)    Best call back number: [phone]    Additional notes:     KEYLA NEEDING A C/B FROM MD OR MD NURSE. PT HAS BROUGHT KEYLA HER DR NOTE FROM APPT YESTERDAY WITH A LIST OF DUTIES THAT PT IS NOW RESTRICTED FROM.  KEYLA DOES NOT FULLY UNDERSTAND THE RESTRICTIONS THAT ARE STATED. SHE IS WANTING TO KNOW IF SHE CAN FAX OR EMAIL THE PRACTICE PT JOB DESCRIPTION TO SEE WHAT PT IS ABLE TO DO FROM IT.    PLEASE ADVISE

## 2021-04-30 ENCOUNTER — TELEPHONE (OUTPATIENT)
Dept: ONCOLOGY | Facility: HOSPITAL | Age: 56
End: 2021-04-30

## 2021-04-30 NOTE — TELEPHONE ENCOUNTER
Case Management/ Note    Patient Name: Angy Davies  YOB: 1965  MRN #: [id number]    OSW called Angy at the request of RADHA Pritchett regarding her work restrictions as her , Misa is requesting more specificity to the work restrictions given via letter on 4/20/21. 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 She denies more pain or swelling to her right hand or arm. She said they are accommodating this at this time and the station she is at at this time does not cause swelling in her hands. She was told that Dr. Harris would be made aware of this and a more specific note would be written. Also, made her aware that Dr. Harris can refer her to PT if she feels this is necessary, and this may help her with work. She gave v/u. OSW will remain available.     Electronically signed by:   Gertrude Hobbs LCSW, OSW-C  04/30/21, 09:48 EDT

## 2021-05-06 DIAGNOSIS — C50.919 MALIGNANT NEOPLASM OF FEMALE BREAST, UNSPECIFIED ESTROGEN RECEPTOR STATUS, UNSPECIFIED LATERALITY, UNSPECIFIED SITE OF BREAST (HCC): Primary | ICD-10-CM

## 2021-05-06 DIAGNOSIS — M79.89 SWELLING OF HAND, UNSPECIFIED LATERALITY: ICD-10-CM

## 2021-06-04 ENCOUNTER — TELEPHONE (OUTPATIENT)
Dept: ONCOLOGY | Facility: CLINIC | Age: 56
End: 2021-06-04

## 2021-06-04 ENCOUNTER — HOSPITAL ENCOUNTER (OUTPATIENT)
Dept: ONCOLOGY | Facility: HOSPITAL | Age: 56
Setting detail: INFUSION SERIES
Discharge: HOME | End: 2021-06-04

## 2021-06-04 VITALS
DIASTOLIC BLOOD PRESSURE: 84 MMHG | BODY MASS INDEX: 27.79 KG/M2 | RESPIRATION RATE: 15 BRPM | HEART RATE: 102 BPM | SYSTOLIC BLOOD PRESSURE: 123 MMHG | WEIGHT: 166.8 LBS | HEIGHT: 65 IN | TEMPERATURE: 97.3 F

## 2021-06-04 DIAGNOSIS — C50.919 MALIGNANT NEOPLASM OF FEMALE BREAST, UNSPECIFIED ESTROGEN RECEPTOR STATUS, UNSPECIFIED LATERALITY, UNSPECIFIED SITE OF BREAST (HCC): Primary | ICD-10-CM

## 2021-06-04 LAB
ALBUMIN SERPL-MCNC: 4.3 G/DL (ref 3.5–5.2)
ALBUMIN/GLOB SERPL: 1.6 G/DL
ALP SERPL-CCNC: 61 U/L (ref 39–117)
ALT SERPL W P-5'-P-CCNC: 13 U/L (ref 1–33)
ANION GAP SERPL CALCULATED.3IONS-SCNC: 10 MMOL/L (ref 5–15)
AST SERPL-CCNC: 18 U/L (ref 1–32)
BASOPHILS # BLD AUTO: 0.01 10*3/MM3 (ref 0–0.2)
BASOPHILS NFR BLD AUTO: 0.2 % (ref 0–1.5)
BILIRUB SERPL-MCNC: 0.2 MG/DL (ref 0–1.2)
BUN SERPL-MCNC: 14 MG/DL (ref 6–20)
BUN/CREAT SERPL: 14.7 (ref 7–25)
CALCIUM SPEC-SCNC: 9.2 MG/DL (ref 8.6–10.5)
CHLORIDE SERPL-SCNC: 103 MMOL/L (ref 98–107)
CO2 SERPL-SCNC: 27 MMOL/L (ref 22–29)
CREAT SERPL-MCNC: 0.95 MG/DL (ref 0.57–1)
DEPRECATED RDW RBC AUTO: 40.8 FL (ref 37–54)
EOSINOPHIL # BLD AUTO: 0.15 10*3/MM3 (ref 0–0.4)
EOSINOPHIL NFR BLD AUTO: 2.4 % (ref 0.3–6.2)
ERYTHROCYTE [DISTWIDTH] IN BLOOD BY AUTOMATED COUNT: 11.8 % (ref 12.3–15.4)
GFR SERPL CREATININE-BSD FRML MDRD: 61 ML/MIN/1.73
GLOBULIN UR ELPH-MCNC: 2.7 GM/DL
GLUCOSE SERPL-MCNC: 86 MG/DL (ref 65–99)
HCT VFR BLD AUTO: 37.9 % (ref 34–46.6)
HGB BLD-MCNC: 12.7 G/DL (ref 12–15.9)
LYMPHOCYTES # BLD AUTO: 1.34 10*3/MM3 (ref 0.7–3.1)
LYMPHOCYTES NFR BLD AUTO: 21.5 % (ref 19.6–45.3)
MAGNESIUM SERPL-MCNC: 1.9 MG/DL (ref 1.6–2.6)
MCH RBC QN AUTO: 32.3 PG (ref 26.6–33)
MCHC RBC AUTO-ENTMCNC: 33.5 G/DL (ref 31.5–35.7)
MCV RBC AUTO: 96.4 FL (ref 79–97)
MONOCYTES # BLD AUTO: 0.47 10*3/MM3 (ref 0.1–0.9)
MONOCYTES NFR BLD AUTO: 7.6 % (ref 5–12)
NEUTROPHILS NFR BLD AUTO: 4.25 10*3/MM3 (ref 1.7–7)
NEUTROPHILS NFR BLD AUTO: 68.3 % (ref 42.7–76)
PHOSPHATE SERPL-MCNC: 2.1 MG/DL (ref 2.5–4.5)
PLATELET # BLD AUTO: 188 10*3/MM3 (ref 140–450)
PMV BLD AUTO: 9.9 FL (ref 6–12)
POTASSIUM SERPL-SCNC: 3.9 MMOL/L (ref 3.5–5.2)
PROT SERPL-MCNC: 7 G/DL (ref 6–8.5)
RBC # BLD AUTO: 3.93 10*6/MM3 (ref 3.77–5.28)
SODIUM SERPL-SCNC: 140 MMOL/L (ref 136–145)
WBC # BLD AUTO: 6.22 10*3/MM3 (ref 3.4–10.8)

## 2021-06-04 PROCEDURE — 80053 COMPREHEN METABOLIC PANEL: CPT | Performed by: INTERNAL MEDICINE

## 2021-06-04 PROCEDURE — 96372 THER/PROPH/DIAG INJ SC/IM: CPT

## 2021-06-04 PROCEDURE — 84100 ASSAY OF PHOSPHORUS: CPT | Performed by: INTERNAL MEDICINE

## 2021-06-04 PROCEDURE — 85025 COMPLETE CBC W/AUTO DIFF WBC: CPT | Performed by: INTERNAL MEDICINE

## 2021-06-04 PROCEDURE — 83735 ASSAY OF MAGNESIUM: CPT | Performed by: INTERNAL MEDICINE

## 2021-06-04 PROCEDURE — 25010000002 DENOSUMAB 60 MG/ML SOLUTION PREFILLED SYRINGE: Performed by: INTERNAL MEDICINE

## 2021-06-04 RX ADMIN — DENOSUMAB 60 MG: 60 INJECTION SUBCUTANEOUS at 13:19

## 2021-06-04 NOTE — TELEPHONE ENCOUNTER
Pt is eligible for a Prolia copay card.  Patient gave permission to enroll in the program.  Pt was enrolled today.

## 2021-06-04 NOTE — TELEPHONE ENCOUNTER
Attempted to call pt to let her know that her phosphorus was low and that Dr. Harris has ordered K-Phos BID for 3 days. No answer or identifying VM. Callback number left. Prescription called into pharmacy.

## 2021-06-04 NOTE — PROGRESS NOTES
Pt here today for prolia injection. She states she's taking calcium and vitamin D daily. Pt denies having any dental issues. Pt to return on 12/03/21 for prolia injection.

## 2021-06-04 NOTE — TELEPHONE ENCOUNTER
----- Message from Juliette Harris MD sent at 6/4/2021  3:16 PM EDT -----  K-Phos 1 packet twice a day for 3 days for low phosphorus

## 2021-06-11 ENCOUNTER — TREATMENT (OUTPATIENT)
Dept: PHYSICAL THERAPY | Facility: CLINIC | Age: 56
End: 2021-06-11

## 2021-06-11 DIAGNOSIS — M79.89 SWELLING OF LIMB: ICD-10-CM

## 2021-06-11 DIAGNOSIS — C50.919 MALIGNANT NEOPLASM OF FEMALE BREAST, UNSPECIFIED ESTROGEN RECEPTOR STATUS, UNSPECIFIED LATERALITY, UNSPECIFIED SITE OF BREAST (HCC): Primary | ICD-10-CM

## 2021-06-11 PROCEDURE — 97530 THERAPEUTIC ACTIVITIES: CPT | Performed by: PHYSICAL THERAPIST

## 2021-06-11 PROCEDURE — 97161 PT EVAL LOW COMPLEX 20 MIN: CPT | Performed by: PHYSICAL THERAPIST

## 2021-06-11 PROCEDURE — 97110 THERAPEUTIC EXERCISES: CPT | Performed by: PHYSICAL THERAPIST

## 2021-06-11 NOTE — PROGRESS NOTES
Physical Therapy Initial Evaluation and Plan of Care    Patient: Angy Davies   : 1965  Diagnosis/ICD-10 Code:  Malignant neoplasm of female breast, unspecified estrogen receptor status, unspecified laterality, unspecified site of breast (CMS/[facility]) [C50.919]  Referring practitioner: Juliette Harris, *  Date of Initial Visit: 2021  Today's Date: 2021  Patient seen for 1 sessions           Subjective Questionnaire: QuickDASH: 88%      Subjective Evaluation    History of Present Illness  Mechanism of injury: Pt is referred to therapy due to swelling in her hands. She works at [employer] and uses her hands a lot at work she was in a job for 18 months and it really aggravates her hands. She is doing a different job now and it seems to be a little better. She had hx of R breast ca. 2 years ago, had a lumpectomy and radiation. She has Osteoporosis and gets the Prolia injection every 6 months.  Denies any swelling in her arms. But has pain/ swelling , tingling / numbing sensation in her hands.     Aggravating factors: using her hand at work    Relieving factors: soaking in warm water, and icy hot    Functional limitation: doing her job related activities, house work , washing dishes causes pain but not swelling.           Quality of life: good    Pain  Current pain ratin  At best pain ratin  At worst pain ratin  Quality: needle-like, throbbing and discomfort  Relieving factors: rest, support and heat  Aggravating factors: movement and repetitive movement    Social Support  Lives with: spouse    Patient Goals  Patient goals for therapy: decreased edema, decreased pain and increased motion           Precautions: breast ca    Objective          Observations     Additional Wrist/Hand Observation Details  No noticeable swelling noted in hands or arms     Palpation     Additional Palpation Details  Mod TTP L thumb/ the joint and ms    Active Range of Motion   Left Shoulder   Normal active range of  motion    Right Shoulder   Normal active range of motion    Left Wrist   Wrist flexion: 55 degrees with pain  Wrist extension: 40 degrees with pain    Right Wrist   Wrist flexion: 60 degrees   Wrist extension: 50 degrees     Strength/Myotome Testing     Left Shoulder   Normal muscle strength    Right Shoulder   Normal muscle strength    Left Wrist/Hand   Wrist extension: 4  Wrist flexion: 4     (2nd hand position)   lbs: 35    Right Wrist/Hand   Normal wrist strength     (2nd hand position)   lbs: 20    Additional Strength Details  Inc pain in L wrist / thumb with ROM and resistance    Swelling     Left Wrist/Hand     Additional Swelling Details  In cm  Hand: 19  Wrist: 16  FA: 24.5  Elbow: 23.5  UA: 27    Right Wrist/Hand     Additional Swelling Details  In cm  Hand: 19  Wrist: 15  FA: 23.5  Elbow: 22.5  UA: 28          Assessment & Plan     Assessment  Impairments: abnormal or restricted ROM, activity intolerance, lacks appropriate home exercise program and pain with function  Assessment details: Pt is a 56 y/o f referred to therapy due to pain and swelling in B hands , L worse than R.  She presents with hx of R breast ca, s/p lumpectomy and radiation. Pt  presents with pain in L hand/ thumb/ wrist with co swelling in both hands at work. Presents with impaired ROM/ strength, dec tolerance to repetitive tasks and performing her job related activities. She has inc pain and swelling at work.  She has tingling / numbing sensation in her hands. She doesn't have any swelling in her arms . Her symtpoms seems to be related to arthritic pain in L hand / thumb or CTS.    Upon initial evaluation pt exhibits the above impairments and functional limitations. Impairments affect her job and performing her daily and normal activities.   Pt will benefit from skilled physical therapy to address impairments, resolve pain and swelling and  maximize function.   Prognosis: good  Functional Limitations: carrying objects,  lifting, uncomfortable because of pain and unable to perform repetitive tasks  Goals  Plan Goals: STGs:  In 4 weeks  1- Pt will  report at least 40 % improvement and pain reduction   2- Pt will be independent with initial HEP   3- Pt will tolerate progression of HEP and her exercise program     LTGs: By DC   1- Pt will report at least 75 % improvement and pain reduction   2- Pt will be independent with final HEP and self management of her condition   3- Pt will have improved DASH score compare to initial score at eval indicating functional improvement   4- Pt will voice readiness for DC with independent program   5- Pt will improve  strength  6- Pt will have full and pain free ROM    Plan  Therapy options: will be seen for skilled physical therapy services  Frequency: 1x week  Treatment plan discussed with: patient  Plan details: 20 visits  We discussed trial of compression gloves and a wrist brace for L wrist to wear at work. Info regarding proper compression and on line ordering was provided.         See flow sheet for treatment detail    History # of Personal Factors and/or Comorbidities: LOW (0)  Examination of Body System(s): # of elements: LOW (1-2)  Clinical Presentation: STABLE   Clinical Decision Making: LOW           Timed:         Manual Therapy:         mins  52262;     Therapeutic Exercise:  15       mins  03106;     Neuromuscular Oscar:      mins  39010;    Therapeutic Activity:     15     mins  71815;     Gait Training:           mins  38125;     Ultrasound:          mins  11103;    Ionto                                   mins   54905  Self Care                            mins   92486  Canal repositioning           mins    07176      Un-Timed:  Electrical Stimulation:         mins  50590 ( );  Dry Needling          mins self-pay  Traction          mins 89225  Low Eval    25      Mins  29411  Mod Eval          Mins  28187  High Eval                            Mins  36293  Re-Eval                                mins  83661        Timed Treatment:  30    mins   Total Treatment:    55   mins    PT SIGNATURE: Prosper Ordonez PT, CLT   DATE TREATMENT INITIATED: 6/11/2021    Initial Certification  Certification Period: 9/9/2021  I certify that the therapy services are furnished while this patient is under my care.  The services outlined above are required by this patient, and will be reviewed every 90 days.     PHYSICIAN: Juliette Harris MD      DATE:     Please sign and return via fax to [phone].. Thank you, [facility].

## 2021-06-30 ENCOUNTER — TREATMENT (OUTPATIENT)
Dept: PHYSICAL THERAPY | Facility: CLINIC | Age: 56
End: 2021-06-30

## 2021-06-30 DIAGNOSIS — C50.919 MALIGNANT NEOPLASM OF FEMALE BREAST, UNSPECIFIED ESTROGEN RECEPTOR STATUS, UNSPECIFIED LATERALITY, UNSPECIFIED SITE OF BREAST (HCC): Primary | ICD-10-CM

## 2021-06-30 PROCEDURE — 97140 MANUAL THERAPY 1/> REGIONS: CPT | Performed by: PHYSICAL THERAPIST

## 2021-06-30 PROCEDURE — 97110 THERAPEUTIC EXERCISES: CPT | Performed by: PHYSICAL THERAPIST

## 2021-06-30 NOTE — PROGRESS NOTES
Physical Therapy Daily Progress Note    Patient: Angy Davies  : 1965  Referring practitioner: Juliette Harris, *  Today's Date: 2021    VISIT#: 2    Subjective   Pt reports: doing about the same, has been doing HEP, ice it and wears her compression glove at home and wrist brace at work.       Objective     See Exercise, Manual, and Modality Logs for complete treatment. Progressed with there ex and HEP as noted.     Patient Education: continue with HEP, wear her compression glove and wrist brace for support    Assessment & Plan     Assessment  Assessment details: Good pedrito to today's treatment session and progression of her ex program. Demos understanding of her HEP and new exercises.     Plan  Plan details: Continue PT and progress as tolerated                      Timed:         Manual Therapy:   10      mins  47235;     Therapeutic Exercise:  20       mins  44648;     Neuromuscular Oscar:        mins  14653;    Therapeutic Activity:          mins  32609;     Gait Training:           mins  93826;     Ultrasound:          mins  39704;    Ionto                                   mins   45525  Self Care                            mins   24633  Canal repositioning           mins    26100    Un-Timed:  Electrical Stimulation:         mins  88633 ( );  Traction          mins 58959  Low Eval          Mins  84663  Mod Eval          Mins  90645  High Eval                            Mins  97107  Re-Eval                               mins  96269    Timed Treatment:  30    mins   Total Treatment:     30   mins    Prosper Ordonez, PT, CLT  Physical Therapist

## 2021-07-07 ENCOUNTER — TELEPHONE (OUTPATIENT)
Dept: PHYSICAL THERAPY | Facility: CLINIC | Age: 56
End: 2021-07-07

## 2021-07-15 NOTE — PROGRESS NOTES
Hematology/Oncology Outpatient Follow Up    PATIENT NAME:Angy Davies  :1965  MRN: [id number]  PRIMARY CARE PHYSICIAN: Germán Spencer FNP  REFERRING PHYSICIAN: Germán Spencer FNP    Chief Complaint   Patient presents with   • Follow-up     Malignant neoplasm of female breast        HISTORY OF PRESENT ILLNESS:     This a 55-year-old female who in 2018 was found to have stage I invasive ductal carcinoma of the right breast.  There underwent right lumpectomy followed by sentinel lymph node biopsy at [facility] on 2018.  Pathology showed grade 1 tubular carcinoma measuring 6.5 mm associated with some DCIS.  Margins were negative.  And lymph nodes were also negative pathologic stage was pT1b N0 M0.  Patient was then placed on adjuvant tamoxifen following radiation treatment.  She has been under the care of Dr. House.  Patient still that she developed progressive memory issues while on tamoxifen and had requested to have her treatments switched.  Review of Dr. House's note suggest that the patient did have hormonal levels for estradiol and FSH FSH was in the postmenopausal range but estradiol was still within perimenopausal range.  She was asked to continue tamoxifen with repeat hormonal levels 6 to 12 months.  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  She states she does not want to lose her job as she is fearful that tamoxifen effects will continue to be a problem.  She has been on tamoxifen since .  Her last mammogram was 2019.  She denies any breast lumps, nipple discharge or skin discoloration.  2/15/2019 she had a DEXA scan which showed osteopenia and patient is currently on calcium with vitamin D.  She also had intermittent abnormalities in her thyroid function testing.  Had a B12 level which was normal at 693    · 2020: Patient discontinued tamoxifen due to memory issues.  · 2020 patient had a  chemistry panel which was actually unremarkable.  Estradiol was less than 5 which is in the postmenopausal range and FSH was 53 which is also in the post menopausal range white count was 7, hemoglobin 12.2 and platelets are 221.  Differentials where 69% neutrophils, 32% lymphocytes, there was no monocytosis eosinophilia or basophilia  · 9/29/2020 she had bilateral diagnostic mammogram which showed dense breast tissue.  But no evidence of malignancy was seen.  Follow-up in 1 year was recommended.  · 9/29/2020 patient had bone density which showed osteopenia  · Patient discontinued Aromasin due to diarrhea  · 4/20/2021: Patient was placed on Femara 2.5 mg p.o. daily  Past Medical History:   Diagnosis Date   • Allergic    • Arthritis    • Asthma    • Breast cancer (CMS/HCC)    • Cataract    • Chronic diarrhea    • Headache    • Hx of radiation therapy    • Injury of back    • Osteoporosis        Past Surgical History:   Procedure Laterality Date   • BREAST BIOPSY     • BREAST LUMPECTOMY     • HYSTERECTOMY           Current Outpatient Medications:   •  Calcium Carb-Cholecalciferol (Os-Arthur Calcium + D3) 500-200 MG-UNIT tablet, Take  by mouth., Disp: , Rfl:   •  cyclobenzaprine (FLEXERIL) 10 MG tablet, TAKE ONE TABLET 2 TIMES A DAY AS NEEDED, Disp: , Rfl:   •  denosumab (Prolia) 60 MG/ML solution prefilled syringe syringe, , Disp: , Rfl:   •  diphenhydrAMINE-acetaminophen (TYLENOL PM)  MG tablet per tablet, Take  by mouth., Disp: , Rfl:   •  EC-Naproxen 500 MG EC tablet, Take 500 mg by mouth 2 (Two) Times a Day With Meals., Disp: , Rfl:   •  ferrous sulfate 324 (65 Fe) MG tablet delayed-release EC tablet, Take 324 mg by mouth Daily With Breakfast., Disp: , Rfl:   •  furosemide (LASIX) 40 MG tablet, Take 40 mg by mouth Daily., Disp: , Rfl:   •  K-Phos 500 MG tablet, Take 500 mg by mouth 2 (Two) Times a Day., Disp: , Rfl:   •  letrozole (FEMARA) 2.5 MG tablet, Take 1 tablet by mouth Daily., Disp: 30 tablet, Rfl: 6  •   lisinopril (PRINIVIL,ZESTRIL) 10 MG tablet, Take  by mouth., Disp: , Rfl:   •  lisinopril-hydrochlorothiazide (PRINZIDE,ZESTORETIC) 10-12.5 MG per tablet, Take 1 tablet by mouth Daily., Disp: , Rfl:   •  methylPREDNISolone (MEDROL) 4 MG dose pack, TAKE 6 TABLETS ON DAY 1 AS DIRECTED ON PACKAGE AND DECREASE BY 1 TAB EACH DAY FOR A TOTAL OF 6 DAYS, Disp: , Rfl:   •  multivitamin (THERAGRAN) tablet tablet, , Disp: , Rfl:   •  naproxen (NAPROSYN) 500 MG tablet, Take 500 mg by mouth 2 (Two) Times a Day With Meals., Disp: , Rfl:   •  potassium chloride ER (K-TAB) 20 MEQ tablet controlled-release ER tablet, Take 20 mEq by mouth Daily. with food, Disp: , Rfl:   •  Specialty Vitamins Products (Menopause Relief) tablet, MENOPAUSE RELIEF ORAL TABLET, Disp: , Rfl:   •  SUMAtriptan (IMITREX) 50 MG tablet, PLEASE SEE ATTACHED FOR DETAILED DIRECTIONS, Disp: , Rfl:   •  Viberzi 75 MG tablet, Take 1 tablet by mouth 2 (Two) Times a Day., Disp: , Rfl:     Allergies   Allergen Reactions   • Penicillins GI Intolerance   • Letrozole Rash       Family History   Problem Relation Age of Onset   • Breast cancer Maternal Grandmother    • Ovarian cancer Mother        Cancer-related family history includes Breast cancer in her maternal grandmother; Ovarian cancer in her mother.    Social History     Tobacco Use   • Smoking status: Former Smoker     Years: 2.00   • Smokeless tobacco: Never Used   Substance Use Topics   • Alcohol use: Yes     Comment: rarely   • Drug use: Never       HPI, ROS and PFSH have been reviewed and confirmed on 7/21/2021.     SUBJECTIVE:    Patient is here for routine follow-up and does not have any specific complaints.  She is doing much better on Femara.          REVIEW OF SYSTEMS:  Review of Systems   Constitutional: Negative for chills and fever.   HENT: Negative for ear pain, mouth sores, nosebleeds and sore throat.    Eyes: Negative for photophobia and visual disturbance.   Respiratory: Negative for wheezing and  "stridor.    Cardiovascular: Negative for chest pain and palpitations.   Gastrointestinal: Negative for abdominal pain, diarrhea, nausea and vomiting.   Endocrine: Negative for cold intolerance and heat intolerance.   Genitourinary: Negative for dysuria and hematuria.   Musculoskeletal: Negative for joint swelling and neck stiffness.   Skin: Negative for color change and rash.   Neurological: Negative for seizures and syncope.   Hematological: Negative for adenopathy.        No obvious bleeding   Psychiatric/Behavioral: Negative for agitation, confusion and hallucinations.   I have reviewed and confirmed the accuracy of the ROS as documented by the MA/LPN/RN Julietteoziel Harris MD      OBJECTIVE:    Vitals:    07/21/21 1429   BP: 114/82   Pulse: (!) 122   Resp: 18   Temp: 98.1 °F (36.7 °C)   TempSrc: Infrared   Weight: 76.2 kg (168 lb)   Height: 165.1 cm (65\")   PainSc:   8   PainLoc: Comment: hand, back     Body mass index is 27.96 kg/m².    ECOG  (0) Fully active, able to carry on all predisease performance without restriction    Physical Exam  Vitals and nursing note reviewed.   Constitutional:       General: She is not in acute distress.     Appearance: She is not diaphoretic.   HENT:      Head: Normocephalic and atraumatic.   Eyes:      General: No scleral icterus.        Right eye: No discharge.         Left eye: No discharge.      Conjunctiva/sclera: Conjunctivae normal.   Neck:      Thyroid: No thyromegaly.   Cardiovascular:      Rate and Rhythm: Normal rate and regular rhythm.      Heart sounds: Normal heart sounds. No friction rub. No gallop.    Pulmonary:      Effort: Pulmonary effort is normal. No respiratory distress.      Breath sounds: No stridor. No wheezing.   Abdominal:      General: Bowel sounds are normal.      Palpations: Abdomen is soft. There is no mass.      Tenderness: There is no abdominal tenderness. There is no guarding or rebound.   Musculoskeletal:         General: No tenderness. " Normal range of motion.      Cervical back: Normal range of motion and neck supple.   Lymphadenopathy:      Cervical: No cervical adenopathy.   Skin:     General: Skin is warm.      Findings: No erythema or rash.   Neurological:      Mental Status: She is alert and oriented to person, place, and time.      Motor: No abnormal muscle tone.   Psychiatric:         Behavior: Behavior normal.       I have reexamined the patient and the results are consistent with the previously documented exam. Julietteoziel Harris MD       RECENT LABS    WBC   Date Value Ref Range Status   07/21/2021 5.90 3.40 - 10.80 10*3/mm3 Final     RBC   Date Value Ref Range Status   07/21/2021 3.49 (L) 3.77 - 5.28 10*6/mm3 Final     Hemoglobin   Date Value Ref Range Status   07/21/2021 11.1 (L) 12.0 - 15.9 g/dL Final     Hematocrit   Date Value Ref Range Status   07/21/2021 33.8 (L) 34.0 - 46.6 % Final     MCV   Date Value Ref Range Status   07/21/2021 96.8 79.0 - 97.0 fL Final     MCH   Date Value Ref Range Status   07/21/2021 31.8 26.6 - 33.0 pg Final     MCHC   Date Value Ref Range Status   07/21/2021 32.8 31.5 - 35.7 g/dL Final     RDW   Date Value Ref Range Status   07/21/2021 11.6 (L) 12.3 - 15.4 % Final     RDW-SD   Date Value Ref Range Status   07/21/2021 39.4 37.0 - 54.0 fl Final     MPV   Date Value Ref Range Status   07/21/2021 10.0 6.0 - 12.0 fL Final     Platelets   Date Value Ref Range Status   07/21/2021 166 140 - 450 10*3/mm3 Final     Neutrophil %   Date Value Ref Range Status   07/21/2021 57.7 42.7 - 76.0 % Final     Lymphocyte %   Date Value Ref Range Status   07/21/2021 28.6 19.6 - 45.3 % Final     Monocyte %   Date Value Ref Range Status   07/21/2021 9.7 5.0 - 12.0 % Final     Eosinophil %   Date Value Ref Range Status   07/21/2021 3.7 0.3 - 6.2 % Final     Basophil %   Date Value Ref Range Status   07/21/2021 0.3 0.0 - 1.5 % Final     Neutrophils, Absolute   Date Value Ref Range Status   07/21/2021 3.40 1.70 - 7.00 10*3/mm3  Final     Lymphocytes, Absolute   Date Value Ref Range Status   07/21/2021 1.69 0.70 - 3.10 10*3/mm3 Final     Monocytes, Absolute   Date Value Ref Range Status   07/21/2021 0.57 0.10 - 0.90 10*3/mm3 Final     Eosinophils, Absolute   Date Value Ref Range Status   07/21/2021 0.22 0.00 - 0.40 10*3/mm3 Final     Basophils, Absolute   Date Value Ref Range Status   07/21/2021 0.02 0.00 - 0.20 10*3/mm3 Final     nRBC   Date Value Ref Range Status   08/02/2018 0 0 /100[WBCs] Final       Lab Results   Component Value Date    GLUCOSE 86 06/04/2021    BUN 14 06/04/2021    CREATININE 0.95 06/04/2021    EGFRIFNONA 61 06/04/2021    BCR 14.7 06/04/2021    K 3.9 06/04/2021    CO2 27.0 06/04/2021    CALCIUM 9.2 06/04/2021    ALBUMIN 4.30 06/04/2021    LABIL2 1.4 11/28/2017    AST 18 06/04/2021    ALT 13 06/04/2021         Assessment/Plan     Malignant neoplasm of female breast, unspecified estrogen receptor status, unspecified laterality, unspecified site of breast (CMS/HCC)  - CBC & Differential  - Mammo Diagnostic Digital Tomosynthesis Bilateral With CAD  - Comprehensive Metabolic Panel  - Ferritin  - Iron Profile  - Vitamin B12  - Methylmalonic Acid, Serum  - Reticulocytes  - Lactate Dehydrogenase    Rash    Anemia, unspecified type  - Comprehensive Metabolic Panel  - Ferritin  - Iron Profile  - Vitamin B12  - Methylmalonic Acid, Serum  - Reticulocytes  - Lactate Dehydrogenase      ASSESSMENT:    1. Invasive ductal carcinoma of the right breast status post right lumpectomy with sentinel lymph node biopsy ER positive, UT positive, HER-2/birdie negative... T1b N0 M0.  Status post radiation therapy.  2. Was on adjuvant endocrine therapy with tamoxifen discontinued due to memory issues  3. Could  not tolerate Aromasin due to diarrhea  4. On Femara 2.5 mg April 20, 2021.  We will plan to continue the same  5. She will continue calcium with vitamin D  6. 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 Patient is now postmenopausal  8. Osteopenia: Reviewed bone density: On Prolia.  We will continue the same  9. New anemia    Discussion    She is now postmenopausal therefore recommended aromatase inhibitor.  I have chosen Aromasin for her due to musculoskeletal issues .  We discussed the side effects, benefits of aromatase inhibitor.  Side effects discussed include but not limited to:    Side effects of aromatase inhibitors include risk of musculoskeletal side effect including arthralgia, myalgia, especially in patients who have underlying musculoskeletal disease such as osteoarthritis, hot flashes, mood changes. There is risk of vaginal dryness, dyspareunia and other sexual dysfunction. Other side effects include degree of cognitive symptoms compared to women not on endocrine therapy. There is possibility of fatigue, forgetfulness, poor sleep hygiene, osteoporosis, osteopenia, risk of fractures, cardiovascular disease and hypercholestolemia. There is also possibility of reactivation of ovarian function especially in women who were premenopausal at time of diagnosis and became amenorrheic while on chemotherapy. The duration of treatment is also for minimum of five years and possibly extending therapy in some women with higher risk features beyond five years. We also discussed that the AI have similar efficacy in the adjuvant setting. Therefore, no one AI is preferred over another. I will obtain a bone density at baseline if none has been done and every two years after that. If there is evidence of osteopenia or osteoporosis, there will be recommendation for bisphosphonate therapy for the duration of aromatase inhibitor therapy and possibly longer depending on bone health status at completion of therapy. We also discussed that labs will be done with follow ups and lipid panel will be done on an annual basis.    She has osteopenia therefore recommended Prolia 60 mg subcu every 6 months.  Also  discussed the side effects of Prolia to include but not limited to:    Side effects of  Prolia was discussed  and include  bone aches and pains, electrolyte abnormalities including hypophosphatemia, hypocalcemia, low magnesium.  There is also a risk of renal insufficiency, osteonecrosis of the jaw has been observed in clinical studies.  There is risk of peripheral edema, hypertension, dermatitis, nausea, vomiting, and mild hematologic problems including anemia, thrombocytopenia.  Discussed the need for her to have dental evaluation prior to initiating prolia.  Discussed also the need to notify us of any future dental procedures planned.  Also discussed the need to notify us for jaw pain at any point in time.       Plans     · Iron studies, B12, reticulocyte today as well as CMP.  Watch her for the results as they return  · Continue Femara 2.5 mg p.o. daily  · Reviewed estradiol, serum FSH, CBC and CMP: Patient is now postmenopausal  · Continue Prolia Prolia every 6 months  · Bone density will be due September 30, 2022  · Continue Os-Arthur D twice a day  · Bilateral diagnostic mammogram due September 29, 2021: Ordered at this visit  · Monthly breast self exams and call for lumps, nipple discharge, skin discoloration or breast pain  · Follow-up with me in 3 months or earlier as needed for problems  · Note given for work adjustments due to arthritic symptoms in her finger joints  · All questions answered to the best of my abilities        Patient verbalized understanding and is in agreement of the above plan.            Thank you very much for allowing me to participate in the care of Sneha, I will keep you updated on her progress           I spent 30 total minutes, face-to-face, caring for Angy today.  90% of this time involved counseling and/or coordination of care as documented within this note.

## 2021-07-21 ENCOUNTER — OFFICE VISIT (OUTPATIENT)
Dept: ONCOLOGY | Facility: CLINIC | Age: 56
End: 2021-07-21

## 2021-07-21 ENCOUNTER — LAB (OUTPATIENT)
Dept: LAB | Facility: HOSPITAL | Age: 56
End: 2021-07-21

## 2021-07-21 VITALS
SYSTOLIC BLOOD PRESSURE: 114 MMHG | DIASTOLIC BLOOD PRESSURE: 82 MMHG | BODY MASS INDEX: 27.99 KG/M2 | TEMPERATURE: 98.1 F | HEART RATE: 122 BPM | HEIGHT: 65 IN | RESPIRATION RATE: 18 BRPM | WEIGHT: 168 LBS

## 2021-07-21 DIAGNOSIS — R21 RASH: ICD-10-CM

## 2021-07-21 DIAGNOSIS — C50.919 MALIGNANT NEOPLASM OF FEMALE BREAST, UNSPECIFIED ESTROGEN RECEPTOR STATUS, UNSPECIFIED LATERALITY, UNSPECIFIED SITE OF BREAST (HCC): ICD-10-CM

## 2021-07-21 DIAGNOSIS — D64.9 ANEMIA, UNSPECIFIED TYPE: ICD-10-CM

## 2021-07-21 DIAGNOSIS — C50.919 MALIGNANT NEOPLASM OF FEMALE BREAST, UNSPECIFIED ESTROGEN RECEPTOR STATUS, UNSPECIFIED LATERALITY, UNSPECIFIED SITE OF BREAST (HCC): Primary | ICD-10-CM

## 2021-07-21 PROBLEM — H35.341 MACULAR PSEUDOHOLE OF RIGHT EYE: Status: ACTIVE | Noted: 2020-07-16

## 2021-07-21 PROBLEM — H25.819 COMBINED FORM OF SENILE CATARACT: Status: ACTIVE | Noted: 2020-06-04

## 2021-07-21 PROBLEM — Z96.1 PSEUDOPHAKIA OF RIGHT EYE: Status: ACTIVE | Noted: 2020-07-09

## 2021-07-21 PROBLEM — H52.31 ANISOMETROPIA: Status: ACTIVE | Noted: 2020-06-22

## 2021-07-21 PROBLEM — Z86.69 HISTORY OF RETINAL DETACHMENT: Status: ACTIVE | Noted: 2020-07-16

## 2021-07-21 PROBLEM — H35.379 EPIRETINAL MEMBRANE: Status: ACTIVE | Noted: 2020-06-04

## 2021-07-21 PROBLEM — Z96.1 PRESENCE OF INTRAOCULAR LENS: Status: ACTIVE | Noted: 2020-06-17

## 2021-07-21 LAB
ALBUMIN SERPL-MCNC: 4.2 G/DL (ref 3.5–5.2)
ALBUMIN/GLOB SERPL: 1.6 G/DL
ALP SERPL-CCNC: 58 U/L (ref 39–117)
ALT SERPL W P-5'-P-CCNC: 14 U/L (ref 1–33)
ANION GAP SERPL CALCULATED.3IONS-SCNC: 8 MMOL/L (ref 5–15)
AST SERPL-CCNC: 20 U/L (ref 1–32)
BASOPHILS # BLD AUTO: 0.02 10*3/MM3 (ref 0–0.2)
BASOPHILS NFR BLD AUTO: 0.3 % (ref 0–1.5)
BILIRUB SERPL-MCNC: 0.2 MG/DL (ref 0–1.2)
BUN SERPL-MCNC: 23 MG/DL (ref 6–20)
BUN/CREAT SERPL: 24.5 (ref 7–25)
CALCIUM SPEC-SCNC: 9.2 MG/DL (ref 8.6–10.5)
CHLORIDE SERPL-SCNC: 102 MMOL/L (ref 98–107)
CO2 SERPL-SCNC: 30 MMOL/L (ref 22–29)
CREAT SERPL-MCNC: 0.94 MG/DL (ref 0.57–1)
DEPRECATED RDW RBC AUTO: 39.4 FL (ref 37–54)
EOSINOPHIL # BLD AUTO: 0.22 10*3/MM3 (ref 0–0.4)
EOSINOPHIL NFR BLD AUTO: 3.7 % (ref 0.3–6.2)
ERYTHROCYTE [DISTWIDTH] IN BLOOD BY AUTOMATED COUNT: 11.6 % (ref 12.3–15.4)
FERRITIN SERPL-MCNC: 215 NG/ML (ref 13–150)
GFR SERPL CREATININE-BSD FRML MDRD: 62 ML/MIN/1.73
GLOBULIN UR ELPH-MCNC: 2.7 GM/DL
GLUCOSE SERPL-MCNC: 76 MG/DL (ref 65–99)
HCT VFR BLD AUTO: 33.8 % (ref 34–46.6)
HGB BLD-MCNC: 11.1 G/DL (ref 12–15.9)
IRON 24H UR-MRATE: 56 MCG/DL (ref 37–145)
IRON SATN MFR SERPL: 19 % (ref 20–50)
LDH SERPL-CCNC: 202 U/L (ref 135–214)
LYMPHOCYTES # BLD AUTO: 1.69 10*3/MM3 (ref 0.7–3.1)
LYMPHOCYTES NFR BLD AUTO: 28.6 % (ref 19.6–45.3)
MCH RBC QN AUTO: 31.8 PG (ref 26.6–33)
MCHC RBC AUTO-ENTMCNC: 32.8 G/DL (ref 31.5–35.7)
MCV RBC AUTO: 96.8 FL (ref 79–97)
MONOCYTES # BLD AUTO: 0.57 10*3/MM3 (ref 0.1–0.9)
MONOCYTES NFR BLD AUTO: 9.7 % (ref 5–12)
NEUTROPHILS NFR BLD AUTO: 3.4 10*3/MM3 (ref 1.7–7)
NEUTROPHILS NFR BLD AUTO: 57.7 % (ref 42.7–76)
PLATELET # BLD AUTO: 166 10*3/MM3 (ref 140–450)
PMV BLD AUTO: 10 FL (ref 6–12)
POTASSIUM SERPL-SCNC: 3.9 MMOL/L (ref 3.5–5.2)
PROT SERPL-MCNC: 6.9 G/DL (ref 6–8.5)
RBC # BLD AUTO: 3.49 10*6/MM3 (ref 3.77–5.28)
RETICS # AUTO: 0.04 10*6/MM3 (ref 0.02–0.13)
RETICS/RBC NFR AUTO: 1.19 % (ref 0.7–1.9)
SODIUM SERPL-SCNC: 140 MMOL/L (ref 136–145)
TIBC SERPL-MCNC: 295 MCG/DL (ref 298–536)
TRANSFERRIN SERPL-MCNC: 198 MG/DL (ref 200–360)
WBC # BLD AUTO: 5.9 10*3/MM3 (ref 3.4–10.8)

## 2021-07-21 PROCEDURE — 36415 COLL VENOUS BLD VENIPUNCTURE: CPT | Performed by: INTERNAL MEDICINE

## 2021-07-21 PROCEDURE — 80053 COMPREHEN METABOLIC PANEL: CPT | Performed by: INTERNAL MEDICINE

## 2021-07-21 PROCEDURE — 82728 ASSAY OF FERRITIN: CPT | Performed by: INTERNAL MEDICINE

## 2021-07-21 PROCEDURE — 82607 VITAMIN B-12: CPT | Performed by: INTERNAL MEDICINE

## 2021-07-21 PROCEDURE — 83615 LACTATE (LD) (LDH) ENZYME: CPT | Performed by: INTERNAL MEDICINE

## 2021-07-21 PROCEDURE — 99214 OFFICE O/P EST MOD 30 MIN: CPT | Performed by: INTERNAL MEDICINE

## 2021-07-21 PROCEDURE — 84466 ASSAY OF TRANSFERRIN: CPT | Performed by: INTERNAL MEDICINE

## 2021-07-21 PROCEDURE — 85045 AUTOMATED RETICULOCYTE COUNT: CPT | Performed by: INTERNAL MEDICINE

## 2021-07-21 PROCEDURE — 85025 COMPLETE CBC W/AUTO DIFF WBC: CPT

## 2021-07-21 PROCEDURE — 83540 ASSAY OF IRON: CPT | Performed by: INTERNAL MEDICINE

## 2021-07-21 RX ORDER — ACETAMINOPHEN,DIPHENHYDRAMINE HCL 500; 25 MG/1; MG/1
TABLET, FILM COATED ORAL
COMMUNITY

## 2021-07-21 RX ORDER — LISINOPRIL 10 MG/1
TABLET ORAL
COMMUNITY
End: 2022-08-16 | Stop reason: HOSPADM

## 2021-07-21 RX ORDER — POTASSIUM PHOSPHATE, MONOBASIC 500 MG/1
500 TABLET, SOLUBLE ORAL 2 TIMES DAILY
COMMUNITY
Start: 2021-06-04

## 2021-07-21 RX ORDER — DENOSUMAB 60 MG/ML
INJECTION SUBCUTANEOUS
COMMUNITY

## 2021-07-21 RX ORDER — DIPHENOXYLATE HYDROCHLORIDE AND ATROPINE SULFATE 2.5; .025 MG/1; MG/1
TABLET ORAL
COMMUNITY
End: 2022-08-16 | Stop reason: HOSPADM

## 2021-07-22 LAB — VIT B12 BLD-MCNC: 826 PG/ML (ref 211–946)

## 2021-07-25 LAB
Lab: NORMAL
METHYLMALONATE SERPL-SCNC: 187 NMOL/L (ref 0–378)

## 2021-08-13 ENCOUNTER — DOCUMENTATION (OUTPATIENT)
Dept: PHYSICAL THERAPY | Facility: CLINIC | Age: 56
End: 2021-08-13

## 2021-08-13 NOTE — PROGRESS NOTES
Discharge Summary  Discharge Summary from Physical/Occupational Therapy Report    Patient: Angy Davies   : 1965  Today's Date: 2021    Patient seen for 2 visits.  Dates of Service: 21 to 21    Discharge Status of Patient: See 21 treatment note for detail.      Comments : pt was seen for 2 visits, she cancelled remaining scheduled appts and has not returned for additional visits. Will be DC from our services.       Thank you for this referral to [facility].    SIGNATURE: Prosper Ordonez, PT

## 2021-09-01 ENCOUNTER — HOSPITAL ENCOUNTER (OUTPATIENT)
Dept: MAMMOGRAPHY | Facility: HOSPITAL | Age: 56
Discharge: HOME | End: 2021-09-01
Admitting: INTERNAL MEDICINE

## 2021-09-01 DIAGNOSIS — C50.919 MALIGNANT NEOPLASM OF FEMALE BREAST, UNSPECIFIED ESTROGEN RECEPTOR STATUS, UNSPECIFIED LATERALITY, UNSPECIFIED SITE OF BREAST (HCC): ICD-10-CM

## 2021-09-01 PROCEDURE — 77066 DX MAMMO INCL CAD BI: CPT

## 2021-09-01 PROCEDURE — G0279 TOMOSYNTHESIS, MAMMO: HCPCS

## 2021-10-07 RX ORDER — LETROZOLE 2.5 MG/1
TABLET, FILM COATED ORAL
Qty: 90 TABLET | Refills: 2 | Status: SHIPPED | OUTPATIENT
Start: 2021-10-07 | End: 2022-04-11

## 2021-10-20 NOTE — PROGRESS NOTES
Hematology/Oncology Outpatient Follow Up    PATIENT NAME:Angy Davies  :1965  MRN: [id number]  PRIMARY CARE PHYSICIAN: Germán Spencer FNP  REFERRING PHYSICIAN: Germán Spencer FNP    Chief Complaint   Patient presents with   • Follow-up     Malignant neoplasm of female breast        HISTORY OF PRESENT ILLNESS:     This a 55-year-old female who in 2018 was found to have stage I invasive ductal carcinoma of the right breast.  There underwent right lumpectomy followed by sentinel lymph node biopsy at [facility] on 2018.  Pathology showed grade 1 tubular carcinoma measuring 6.5 mm associated with some DCIS.  Margins were negative.  And lymph nodes were also negative pathologic stage was pT1b N0 M0.  Patient was then placed on adjuvant tamoxifen following radiation treatment.  She has been under the care of Dr. House.  Patient still that she developed progressive memory issues while on tamoxifen and had requested to have her treatments switched.  Review of Dr. House's note suggest that the patient did have hormonal levels for estradiol and FSH FSH was in the postmenopausal range but estradiol was still within perimenopausal range.  She was asked to continue tamoxifen with repeat hormonal levels 6 to 12 months.  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  She states she does not want to lose her job as she is fearful that tamoxifen effects will continue to be a problem.  She has been on tamoxifen since .  Her last mammogram was 2019.  She denies any breast lumps, nipple discharge or skin discoloration.  2/15/2019 she had a DEXA scan which showed osteopenia and patient is currently on calcium with vitamin D.  She also had intermittent abnormalities in her thyroid function testing.  Had a B12 level which was normal at 693    · 2020: Patient discontinued tamoxifen due to memory issues.  · 2020 patient had a  chemistry panel which was actually unremarkable.  Estradiol was less than 5 which is in the postmenopausal range and FSH was 53 which is also in the post menopausal range white count was 7, hemoglobin 12.2 and platelets are 221.  Differentials where 69% neutrophils, 32% lymphocytes, there was no monocytosis eosinophilia or basophilia  · 9/29/2020 she had bilateral diagnostic mammogram which showed dense breast tissue.  But no evidence of malignancy was seen.  Follow-up in 1 year was recommended.  · 9/29/2020 patient had bone density which showed osteopenia  · Patient discontinued Aromasin due to diarrhea  · 4/20/2021: Patient was placed on Femara 2.5 mg p.o. daily  · September 2021 she had bilateral diagnostic mammogram which was negative  Past Medical History:   Diagnosis Date   • Allergic    • Arthritis    • Asthma    • Breast cancer (HCC)    • Cataract    • Chronic diarrhea    • Headache    • Hx of radiation therapy    • Injury of back    • Osteoporosis        Past Surgical History:   Procedure Laterality Date   • BREAST BIOPSY     • BREAST LUMPECTOMY     • HYSTERECTOMY           Current Outpatient Medications:   •  Calcium Carb-Cholecalciferol (Os-Arthur Calcium + D3) 500-200 MG-UNIT tablet, Take  by mouth., Disp: , Rfl:   •  cyclobenzaprine (FLEXERIL) 10 MG tablet, TAKE ONE TABLET 2 TIMES A DAY AS NEEDED, Disp: , Rfl:   •  denosumab (Prolia) 60 MG/ML solution prefilled syringe syringe, , Disp: , Rfl:   •  diphenhydrAMINE-acetaminophen (TYLENOL PM)  MG tablet per tablet, Take  by mouth., Disp: , Rfl:   •  EC-Naproxen 500 MG EC tablet, Take 500 mg by mouth 2 (Two) Times a Day With Meals., Disp: , Rfl:   •  ferrous sulfate 324 (65 Fe) MG tablet delayed-release EC tablet, Take 324 mg by mouth Daily With Breakfast., Disp: , Rfl:   •  furosemide (LASIX) 40 MG tablet, Take 40 mg by mouth Daily., Disp: , Rfl:   •  K-Phos 500 MG tablet, Take 500 mg by mouth 2 (Two) Times a Day., Disp: , Rfl:   •  letrozole (FEMARA)  2.5 MG tablet, TAKE 1 TABLET BY MOUTH EVERY DAY, Disp: 90 tablet, Rfl: 2  •  levothyroxine (SYNTHROID, LEVOTHROID) 25 MCG tablet, , Disp: , Rfl:   •  lisinopril (PRINIVIL,ZESTRIL) 10 MG tablet, Take  by mouth., Disp: , Rfl:   •  lisinopril-hydrochlorothiazide (PRINZIDE,ZESTORETIC) 10-12.5 MG per tablet, Take 1 tablet by mouth Daily., Disp: , Rfl:   •  methylPREDNISolone (MEDROL) 4 MG dose pack, TAKE 6 TABLETS ON DAY 1 AS DIRECTED ON PACKAGE AND DECREASE BY 1 TAB EACH DAY FOR A TOTAL OF 6 DAYS, Disp: , Rfl:   •  multivitamin (THERAGRAN) tablet tablet, , Disp: , Rfl:   •  naproxen (NAPROSYN) 500 MG tablet, Take 500 mg by mouth 2 (Two) Times a Day With Meals., Disp: , Rfl:   •  potassium chloride ER (K-TAB) 20 MEQ tablet controlled-release ER tablet, Take 20 mEq by mouth Daily. with food, Disp: , Rfl:   •  Specialty Vitamins Products (Menopause Relief) tablet, MENOPAUSE RELIEF ORAL TABLET, Disp: , Rfl:   •  SUMAtriptan (IMITREX) 50 MG tablet, PLEASE SEE ATTACHED FOR DETAILED DIRECTIONS, Disp: , Rfl:   •  Viberzi 75 MG tablet, Take 1 tablet by mouth 2 (Two) Times a Day., Disp: , Rfl:     Allergies   Allergen Reactions   • Penicillins GI Intolerance   • Letrozole Rash       Family History   Problem Relation Age of Onset   • Breast cancer Maternal Grandmother    • Ovarian cancer Mother        Cancer-related family history includes Breast cancer in her maternal grandmother; Ovarian cancer in her mother.    Social History     Tobacco Use   • Smoking status: Former Smoker     Years: 2.00   • Smokeless tobacco: Never Used   Substance Use Topics   • Alcohol use: Yes     Comment: rarely   • Drug use: Never       HPI, ROS and PFSH have been reviewed and confirmed on 10/21/2021.     SUBJECTIVE:    Patient is here for routine follow-up and does not have any specific complaints.  She is doing much better on Femara.          REVIEW OF SYSTEMS:  Review of Systems   Constitutional: Negative for chills and fever.   HENT: Negative for  "ear pain, mouth sores, nosebleeds and sore throat.    Eyes: Negative for photophobia and visual disturbance.   Respiratory: Negative for wheezing and stridor.    Cardiovascular: Negative for chest pain and palpitations.   Gastrointestinal: Negative for abdominal pain, diarrhea, nausea and vomiting.   Endocrine: Negative for cold intolerance and heat intolerance.   Genitourinary: Negative for dysuria and hematuria.   Musculoskeletal: Negative for joint swelling and neck stiffness.   Skin: Negative for color change and rash.   Neurological: Negative for seizures and syncope.   Hematological: Negative for adenopathy.        No obvious bleeding   Psychiatric/Behavioral: Negative for agitation, confusion and hallucinations.   I have reviewed and confirmed the accuracy of the ROS as documented by the MA/LPN/RN Juliette Harris MD      OBJECTIVE:    Vitals:    10/21/21 1602   BP: 108/85   Pulse: 87   Resp: 18   Temp: 97.9 °F (36.6 °C)   TempSrc: Infrared   Weight: 76.2 kg (168 lb)   Height: 165.1 cm (65\")   PainSc:   6   PainLoc: Comment: back     Body mass index is 27.96 kg/m².    ECOG  (0) Fully active, able to carry on all predisease performance without restriction    Physical Exam  Vitals and nursing note reviewed.   Constitutional:       General: She is not in acute distress.     Appearance: She is not diaphoretic.   HENT:      Head: Normocephalic and atraumatic.   Eyes:      General: No scleral icterus.        Right eye: No discharge.         Left eye: No discharge.      Conjunctiva/sclera: Conjunctivae normal.   Neck:      Thyroid: No thyromegaly.   Cardiovascular:      Rate and Rhythm: Normal rate and regular rhythm.      Heart sounds: Normal heart sounds. No friction rub. No gallop.    Pulmonary:      Effort: Pulmonary effort is normal. No respiratory distress.      Breath sounds: No stridor. No wheezing.   Abdominal:      General: Bowel sounds are normal.      Palpations: Abdomen is soft. There is no mass. "      Tenderness: There is no abdominal tenderness. There is no guarding or rebound.   Musculoskeletal:         General: No tenderness. Normal range of motion.      Cervical back: Normal range of motion and neck supple.   Lymphadenopathy:      Cervical: No cervical adenopathy.   Skin:     General: Skin is warm.      Findings: No erythema or rash.   Neurological:      Mental Status: She is alert and oriented to person, place, and time.      Motor: No abnormal muscle tone.   Psychiatric:         Behavior: Behavior normal.       I have reexamined the patient and the results are consistent with the previously documented exam. Juliettetiffani Harris MD       RECENT LABS    WBC   Date Value Ref Range Status   10/21/2021 7.16 3.40 - 10.80 10*3/mm3 Final     RBC   Date Value Ref Range Status   10/21/2021 4.30 3.77 - 5.28 10*6/mm3 Final     Hemoglobin   Date Value Ref Range Status   10/21/2021 13.6 12.0 - 15.9 g/dL Final     Hematocrit   Date Value Ref Range Status   10/21/2021 41.8 34.0 - 46.6 % Final     MCV   Date Value Ref Range Status   10/21/2021 97.2 (H) 79.0 - 97.0 fL Final     MCH   Date Value Ref Range Status   10/21/2021 31.6 26.6 - 33.0 pg Final     MCHC   Date Value Ref Range Status   10/21/2021 32.5 31.5 - 35.7 g/dL Final     RDW   Date Value Ref Range Status   10/21/2021 11.8 (L) 12.3 - 15.4 % Final     RDW-SD   Date Value Ref Range Status   10/21/2021 40.9 37.0 - 54.0 fl Final     MPV   Date Value Ref Range Status   10/21/2021 9.0 6.0 - 12.0 fL Final     Platelets   Date Value Ref Range Status   10/21/2021 203 140 - 450 10*3/mm3 Final     Neutrophil %   Date Value Ref Range Status   10/21/2021 62.8 42.7 - 76.0 % Final     Lymphocyte %   Date Value Ref Range Status   10/21/2021 25.3 19.6 - 45.3 % Final     Monocyte %   Date Value Ref Range Status   10/21/2021 9.2 5.0 - 12.0 % Final     Eosinophil %   Date Value Ref Range Status   10/21/2021 2.4 0.3 - 6.2 % Final     Basophil %   Date Value Ref Range Status    10/21/2021 0.3 0.0 - 1.5 % Final     Neutrophils, Absolute   Date Value Ref Range Status   10/21/2021 4.50 1.70 - 7.00 10*3/mm3 Final     Lymphocytes, Absolute   Date Value Ref Range Status   10/21/2021 1.81 0.70 - 3.10 10*3/mm3 Final     Monocytes, Absolute   Date Value Ref Range Status   10/21/2021 0.66 0.10 - 0.90 10*3/mm3 Final     Eosinophils, Absolute   Date Value Ref Range Status   10/21/2021 0.17 0.00 - 0.40 10*3/mm3 Final     Basophils, Absolute   Date Value Ref Range Status   10/21/2021 0.02 0.00 - 0.20 10*3/mm3 Final     nRBC   Date Value Ref Range Status   08/02/2018 0 0 /100[WBCs] Final       Lab Results   Component Value Date    GLUCOSE 76 07/21/2021    BUN 23 (H) 07/21/2021    CREATININE 0.94 07/21/2021    EGFRIFNONA 62 07/21/2021    BCR 24.5 07/21/2021    K 3.9 07/21/2021    CO2 30.0 (H) 07/21/2021    CALCIUM 9.2 07/21/2021    ALBUMIN 4.20 07/21/2021    LABIL2 1.4 11/28/2017    AST 20 07/21/2021    ALT 14 07/21/2021         Assessment/Plan     Malignant neoplasm of female breast, unspecified estrogen receptor status, unspecified laterality, unspecified site of breast (HCC)  - CBC & Differential    Anemia, unspecified type  - CBC & Differential      ASSESSMENT:    1. Invasive ductal carcinoma of the right breast status post right lumpectomy with sentinel lymph node biopsy ER positive, NE positive, HER-2/birdie negative... T1b N0 M0.  Status post radiation therapy.  2. Was on adjuvant endocrine therapy with tamoxifen discontinued due to memory issues  3. Could  not tolerate Aromasin due to diarrhea  4. On Femara 2.5 mg April 20, 2021.  So far she is tolerating Femara  5. She will continue calcium with vitamin D  6. 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 Patient is now postmenopausal  8. Osteopenia: Reviewed bone density: On Prolia.  We will continue the same  9. New anemia, labs reviewed.  Anemia has resolved        Plans     · Iron  studies, B12, reticulocyte today as well as CMP.  Reviewed  · Continue Femara 2.5 mg p.o. daily  · Reviewed her mammogram from September 1, 2021.  Next mammogram is due September 1, 2022  · Reviewed estradiol, serum FSH, CBC and CMP: Patient is now postmenopausal  · Continue Prolia Prolia every 6 months: We will schedule  · Bone density will be due September 30, 2022  · Continue Os-Arthur D twice a day  · Monthly breast self exams and call for lumps, nipple discharge, skin discoloration or breast pain  · Follow-up with me in 6 months  · Note given for work adjustments due to arthritic symptoms in her finger joints  · All questions answered to the best of my abilities        Patient verbalized understanding and is in agreement of the above plan.            Thank you very much for allowing me to participate in the care of Sneha, I will keep you updated on her progress           I spent 30 total minutes, face-to-face, caring for Angy today.  90% of this time involved counseling and/or coordination of care as documented within this note.

## 2021-10-21 ENCOUNTER — OFFICE VISIT (OUTPATIENT)
Dept: ONCOLOGY | Facility: CLINIC | Age: 56
End: 2021-10-21

## 2021-10-21 ENCOUNTER — LAB (OUTPATIENT)
Dept: LAB | Facility: HOSPITAL | Age: 56
End: 2021-10-21

## 2021-10-21 VITALS
HEIGHT: 65 IN | WEIGHT: 168 LBS | RESPIRATION RATE: 18 BRPM | DIASTOLIC BLOOD PRESSURE: 85 MMHG | SYSTOLIC BLOOD PRESSURE: 108 MMHG | TEMPERATURE: 97.9 F | HEART RATE: 87 BPM | BODY MASS INDEX: 27.99 KG/M2

## 2021-10-21 DIAGNOSIS — D64.9 ANEMIA, UNSPECIFIED TYPE: ICD-10-CM

## 2021-10-21 DIAGNOSIS — C50.919 MALIGNANT NEOPLASM OF FEMALE BREAST, UNSPECIFIED ESTROGEN RECEPTOR STATUS, UNSPECIFIED LATERALITY, UNSPECIFIED SITE OF BREAST (HCC): Primary | ICD-10-CM

## 2021-10-21 DIAGNOSIS — C50.919 MALIGNANT NEOPLASM OF FEMALE BREAST, UNSPECIFIED ESTROGEN RECEPTOR STATUS, UNSPECIFIED LATERALITY, UNSPECIFIED SITE OF BREAST (HCC): ICD-10-CM

## 2021-10-21 LAB
BASOPHILS # BLD AUTO: 0.02 10*3/MM3 (ref 0–0.2)
BASOPHILS NFR BLD AUTO: 0.3 % (ref 0–1.5)
DEPRECATED RDW RBC AUTO: 40.9 FL (ref 37–54)
EOSINOPHIL # BLD AUTO: 0.17 10*3/MM3 (ref 0–0.4)
EOSINOPHIL NFR BLD AUTO: 2.4 % (ref 0.3–6.2)
ERYTHROCYTE [DISTWIDTH] IN BLOOD BY AUTOMATED COUNT: 11.8 % (ref 12.3–15.4)
HCT VFR BLD AUTO: 41.8 % (ref 34–46.6)
HGB BLD-MCNC: 13.6 G/DL (ref 12–15.9)
LYMPHOCYTES # BLD AUTO: 1.81 10*3/MM3 (ref 0.7–3.1)
LYMPHOCYTES NFR BLD AUTO: 25.3 % (ref 19.6–45.3)
MCH RBC QN AUTO: 31.6 PG (ref 26.6–33)
MCHC RBC AUTO-ENTMCNC: 32.5 G/DL (ref 31.5–35.7)
MCV RBC AUTO: 97.2 FL (ref 79–97)
MONOCYTES # BLD AUTO: 0.66 10*3/MM3 (ref 0.1–0.9)
MONOCYTES NFR BLD AUTO: 9.2 % (ref 5–12)
NEUTROPHILS NFR BLD AUTO: 4.5 10*3/MM3 (ref 1.7–7)
NEUTROPHILS NFR BLD AUTO: 62.8 % (ref 42.7–76)
PLATELET # BLD AUTO: 203 10*3/MM3 (ref 140–450)
PMV BLD AUTO: 9 FL (ref 6–12)
RBC # BLD AUTO: 4.3 10*6/MM3 (ref 3.77–5.28)
WBC # BLD AUTO: 7.16 10*3/MM3 (ref 3.4–10.8)

## 2021-10-21 PROCEDURE — 99214 OFFICE O/P EST MOD 30 MIN: CPT | Performed by: INTERNAL MEDICINE

## 2021-10-21 PROCEDURE — 85025 COMPLETE CBC W/AUTO DIFF WBC: CPT

## 2021-10-21 PROCEDURE — 36415 COLL VENOUS BLD VENIPUNCTURE: CPT

## 2021-10-21 RX ORDER — LEVOTHYROXINE SODIUM 0.03 MG/1
50 TABLET ORAL DAILY
COMMUNITY
Start: 2021-10-18

## 2022-04-11 RX ORDER — LETROZOLE 2.5 MG/1
TABLET, FILM COATED ORAL
Qty: 90 TABLET | Refills: 2 | Status: SHIPPED | OUTPATIENT
Start: 2022-04-11

## 2022-04-20 NOTE — PROGRESS NOTES
Hematology/Oncology Outpatient Follow Up    PATIENT NAME:Angy Davies  :1965  MRN: [id number]  PRIMARY CARE PHYSICIAN: Germán Spencer FNP  REFERRING PHYSICIAN: Germán Spencer FNP    Chief Complaint   Patient presents with   • Follow-up     Malignant neoplasm of female breast, unspecified estrogen receptor status, unspecified laterality, unspecified site of breast (HCC)        HISTORY OF PRESENT ILLNESS:     This a 55-year-old female who in 2018 was found to have stage I invasive ductal carcinoma of the right breast.  There underwent right lumpectomy followed by sentinel lymph node biopsy at [facility] on 2018.  Pathology showed grade 1 tubular carcinoma measuring 6.5 mm associated with some DCIS.  Margins were negative.  And lymph nodes were also negative pathologic stage was pT1b N0 M0.  Patient was then placed on adjuvant tamoxifen following radiation treatment.  She has been under the care of Dr. House.  Patient still that she developed progressive memory issues while on tamoxifen and had requested to have her treatments switched.  Review of Dr. House's note suggest that the patient did have hormonal levels for estradiol and FSH FSH was in the postmenopausal range but estradiol was still within perimenopausal range.  She was asked to continue tamoxifen with repeat hormonal levels 6 to 12 months.  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  She states she does not want to lose her job as she is fearful that tamoxifen effects will continue to be a problem.  She has been on tamoxifen since .  Her last mammogram was 2019.  She denies any breast lumps, nipple discharge or skin discoloration.  2/15/2019 she had a DEXA scan which showed osteopenia and patient is currently on calcium with vitamin D.  She also had intermittent abnormalities in her thyroid function testing.  Had a B12 level which was normal at  693    · 9/17/2020: Patient discontinued tamoxifen due to memory issues.  · 9/17/2020 patient had a chemistry panel which was actually unremarkable.  Estradiol was less than 5 which is in the postmenopausal range and FSH was 53 which is also in the post menopausal range white count was 7, hemoglobin 12.2 and platelets are 221.  Differentials where 69% neutrophils, 32% lymphocytes, there was no monocytosis eosinophilia or basophilia  · 9/29/2020 she had bilateral diagnostic mammogram which showed dense breast tissue.  But no evidence of malignancy was seen.  Follow-up in 1 year was recommended.  · 9/29/2020 patient had bone density which showed osteopenia  · Patient discontinued Aromasin due to diarrhea  · 4/20/2021: Patient was placed on Femara 2.5 mg p.o. daily  · September 2021 she had bilateral diagnostic mammogram which was negative  Past Medical History:   Diagnosis Date   • Allergic    • Arthritis    • Asthma    • Breast cancer (HCC)    • Cataract    • Chronic diarrhea    • Headache    • Hx of radiation therapy    • Injury of back    • Osteoporosis        Past Surgical History:   Procedure Laterality Date   • BREAST BIOPSY     • BREAST LUMPECTOMY     • HYSTERECTOMY           Current Outpatient Medications:   •  Calcium Carb-Cholecalciferol (Os-Arthur Calcium + D3) 500-200 MG-UNIT tablet, Take  by mouth., Disp: , Rfl:   •  cyclobenzaprine (FLEXERIL) 10 MG tablet, TAKE ONE TABLET 2 TIMES A DAY AS NEEDED, Disp: , Rfl:   •  denosumab (Prolia) 60 MG/ML solution prefilled syringe syringe, , Disp: , Rfl:   •  dicyclomine (BENTYL) 20 MG tablet, Take 20 mg by mouth 2 (Two) Times a Day., Disp: , Rfl:   •  diphenhydrAMINE-acetaminophen (TYLENOL PM)  MG tablet per tablet, Take  by mouth., Disp: , Rfl:   •  EC-Naproxen 500 MG EC tablet, Take 500 mg by mouth 2 (Two) Times a Day With Meals., Disp: , Rfl:   •  ferrous sulfate 324 (65 Fe) MG tablet delayed-release EC tablet, Take 324 mg by mouth Daily With Breakfast., Disp:  , Rfl:   •  furosemide (LASIX) 40 MG tablet, Take 40 mg by mouth Daily., Disp: , Rfl:   •  K-Phos 500 MG tablet, Take 500 mg by mouth 2 (Two) Times a Day., Disp: , Rfl:   •  letrozole (FEMARA) 2.5 MG tablet, TAKE 1 TABLET BY MOUTH EVERY DAY, Disp: 90 tablet, Rfl: 2  •  levothyroxine (SYNTHROID, LEVOTHROID) 25 MCG tablet, , Disp: , Rfl:   •  levothyroxine (SYNTHROID, LEVOTHROID) 50 MCG tablet, Take 50 mcg by mouth Daily., Disp: , Rfl:   •  lisinopril (PRINIVIL,ZESTRIL) 10 MG tablet, Take  by mouth., Disp: , Rfl:   •  lisinopril-hydrochlorothiazide (PRINZIDE,ZESTORETIC) 10-12.5 MG per tablet, Take 1 tablet by mouth Daily., Disp: , Rfl:   •  methylPREDNISolone (MEDROL) 4 MG dose pack, TAKE 6 TABLETS ON DAY 1 AS DIRECTED ON PACKAGE AND DECREASE BY 1 TAB EACH DAY FOR A TOTAL OF 6 DAYS, Disp: , Rfl:   •  multivitamin (THERAGRAN) tablet tablet, , Disp: , Rfl:   •  naproxen (NAPROSYN) 500 MG tablet, Take 500 mg by mouth 2 (Two) Times a Day With Meals., Disp: , Rfl:   •  potassium chloride ER (K-TAB) 20 MEQ tablet controlled-release ER tablet, Take 20 mEq by mouth Daily. with food, Disp: , Rfl:   •  Specialty Vitamins Products (Menopause Relief) tablet, MENOPAUSE RELIEF ORAL TABLET, Disp: , Rfl:   •  SUMAtriptan (IMITREX) 50 MG tablet, PLEASE SEE ATTACHED FOR DETAILED DIRECTIONS, Disp: , Rfl:   •  Viberzi 75 MG tablet, Take 1 tablet by mouth 2 (Two) Times a Day., Disp: , Rfl:   •  alendronate (Fosamax) 70 MG tablet, Take 1 tablet by mouth Every 7 (Seven) Days. Take  with a full glass of water on an empty stomach in the morning, stay  in an upright position for at least one hour after taking this medication., Disp: 4 tablet, Rfl: 11    Allergies   Allergen Reactions   • Penicillins GI Intolerance   • Letrozole Rash       Family History   Problem Relation Age of Onset   • Breast cancer Maternal Grandmother    • Ovarian cancer Mother        Cancer-related family history includes Breast cancer in her maternal grandmother;  "Ovarian cancer in her mother.    Social History     Tobacco Use   • Smoking status: Former Smoker     Years: 2.00   • Smokeless tobacco: Never Used   Substance Use Topics   • Alcohol use: Yes     Comment: rarely   • Drug use: Never       HPI, ROS and PFSH have been reviewed and confirmed on 4/21/2022.     SUBJECTIVE:    Patient is here for routine follow-up and does not have any specific complaints.  She is doing much better on Femara but still has generalized body aches and pains.          REVIEW OF SYSTEMS:    Review of Systems   Constitutional: Negative for chills and fever.   HENT: Negative for ear pain, mouth sores, nosebleeds and sore throat.    Eyes: Negative for photophobia and visual disturbance.   Respiratory: Negative for wheezing and stridor.    Cardiovascular: Negative for chest pain and palpitations.   Gastrointestinal: Negative for abdominal pain, diarrhea, nausea and vomiting.   Endocrine: Negative for cold intolerance and heat intolerance.   Genitourinary: Negative for dysuria and hematuria.   Musculoskeletal: Negative for joint swelling and neck stiffness.   Skin: Negative for color change and rash.   Neurological: Negative for seizures and syncope.   Hematological: Negative for adenopathy.        No obvious bleeding   Psychiatric/Behavioral: Negative for agitation, confusion and hallucinations.     I have reviewed and confirmed the accuracy of the ROS as documented by the MA/LPN/RN Juliette Harris MD      OBJECTIVE:    Vitals:    04/21/22 1309   BP: 103/75   Pulse: 105   Resp: 18   Temp: 96.9 °F (36.1 °C)   SpO2: 96%   Weight: 78.3 kg (172 lb 9.6 oz)   Height: 165.1 cm (65\")   PainSc:   6     Body mass index is 28.72 kg/m².    ECOG    (0) Fully active, able to carry on all predisease performance without restriction    Physical Exam  Vitals and nursing note reviewed.   Constitutional:       General: She is not in acute distress.     Appearance: She is not diaphoretic.   HENT:      Head: " Normocephalic and atraumatic.   Eyes:      General: No scleral icterus.        Right eye: No discharge.         Left eye: No discharge.      Conjunctiva/sclera: Conjunctivae normal.   Neck:      Thyroid: No thyromegaly.   Cardiovascular:      Rate and Rhythm: Normal rate and regular rhythm.      Heart sounds: Normal heart sounds.     No friction rub. No gallop.   Pulmonary:      Effort: Pulmonary effort is normal. No respiratory distress.      Breath sounds: No stridor. No wheezing.   Abdominal:      General: Bowel sounds are normal.      Palpations: Abdomen is soft. There is no mass.      Tenderness: There is no abdominal tenderness. There is no guarding or rebound.   Musculoskeletal:         General: No tenderness. Normal range of motion.      Cervical back: Normal range of motion and neck supple.   Lymphadenopathy:      Cervical: No cervical adenopathy.   Skin:     General: Skin is warm.      Findings: No erythema or rash.   Neurological:      Mental Status: She is alert and oriented to person, place, and time.      Motor: No abnormal muscle tone.   Psychiatric:         Behavior: Behavior normal.       I have reexamined the patient and the results are consistent with the previously documented exam. Juliette Harris MD       RECENT LABS    WBC   Date Value Ref Range Status   04/21/2022 6.34 3.40 - 10.80 10*3/mm3 Final     RBC   Date Value Ref Range Status   04/21/2022 4.39 3.77 - 5.28 10*6/mm3 Final     Hemoglobin   Date Value Ref Range Status   04/21/2022 13.8 12.0 - 15.9 g/dL Final     Hematocrit   Date Value Ref Range Status   04/21/2022 40.8 34.0 - 46.6 % Final     MCV   Date Value Ref Range Status   04/21/2022 92.9 79.0 - 97.0 fL Final     MCH   Date Value Ref Range Status   04/21/2022 31.4 26.6 - 33.0 pg Final     MCHC   Date Value Ref Range Status   04/21/2022 33.8 31.5 - 35.7 g/dL Final     RDW   Date Value Ref Range Status   04/21/2022 11.9 (L) 12.3 - 15.4 % Final     RDW-SD   Date Value Ref Range  Status   04/21/2022 39.6 37.0 - 54.0 fl Final     MPV   Date Value Ref Range Status   04/21/2022 9.6 6.0 - 12.0 fL Final     Platelets   Date Value Ref Range Status   04/21/2022 227 140 - 450 10*3/mm3 Final     Neutrophil %   Date Value Ref Range Status   04/21/2022 65.1 42.7 - 76.0 % Final     Lymphocyte %   Date Value Ref Range Status   04/21/2022 24.0 19.6 - 45.3 % Final     Monocyte %   Date Value Ref Range Status   04/21/2022 8.2 5.0 - 12.0 % Final     Eosinophil %   Date Value Ref Range Status   04/21/2022 2.4 0.3 - 6.2 % Final     Basophil %   Date Value Ref Range Status   04/21/2022 0.3 0.0 - 1.5 % Final     Neutrophils, Absolute   Date Value Ref Range Status   04/21/2022 4.13 1.70 - 7.00 10*3/mm3 Final     Lymphocytes, Absolute   Date Value Ref Range Status   04/21/2022 1.52 0.70 - 3.10 10*3/mm3 Final     Monocytes, Absolute   Date Value Ref Range Status   04/21/2022 0.52 0.10 - 0.90 10*3/mm3 Final     Eosinophils, Absolute   Date Value Ref Range Status   04/21/2022 0.15 0.00 - 0.40 10*3/mm3 Final     Basophils, Absolute   Date Value Ref Range Status   04/21/2022 0.02 0.00 - 0.20 10*3/mm3 Final     nRBC   Date Value Ref Range Status   08/02/2018 0 0 /100[WBCs] Final       Lab Results   Component Value Date    GLUCOSE 94 04/21/2022    BUN 18 04/21/2022    CREATININE 0.90 04/21/2022    EGFRIFNONA 62 07/21/2021    BCR 20.0 04/21/2022    K 3.8 04/21/2022    CO2 28.0 04/21/2022    CALCIUM 10.1 04/21/2022    ALBUMIN 4.50 04/21/2022    LABIL2 1.4 11/28/2017    AST 21 04/21/2022    ALT 16 04/21/2022         Assessment/Plan     Malignant neoplasm of female breast, unspecified estrogen receptor status, unspecified laterality, unspecified site of breast (HCC)  - CBC & Differential  - NM Bone Scan Whole Body  - Mammo Diagnostic Digital Tomosynthesis Bilateral With CAD  - US Breast Bilateral Limited  - DEXA Bone Density Axial  - Comprehensive Metabolic Panel    Postmenopausal  - DEXA Bone Density  Axial      ASSESSMENT:    1. Invasive ductal carcinoma of the right breast status post right lumpectomy with sentinel lymph node biopsy ER positive, [place] positive, HER-2/birdie negative... T1b N0 M0.  Status post radiation therapy.  2. Was on adjuvant endocrine therapy with tamoxifen discontinued due to memory issues  3. Could  not tolerate Aromasin due to diarrhea  4. On Femara 2.5 mg April 20, 2021.  So far she is tolerating Femara  5. Body aches and pains: We will further evaluate with bone scan.  This has been ordered  6. She will continue calcium with vitamin D  7. 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 Patient is now postmenopausal  9. Osteopenia: Reviewed bone density: On Prolia having insurance issues.  Patient also states does not tolerate Fosamax .  Bone density is coming up in September 2022 we will reevaluate at that time  10. New anemia, labs reviewed.  Anemia has resolved        Plans     · Bone scan due to body aches and pains to further evaluate  · Iron studies, B12, reticulocyte today as well as CMP.  Reviewed  · Continue Femara 2.5 mg p.o. daily  · Reviewed her mammogram from September 1, 2021.  Next mammogram is due September 1, 2022.  I have placed the order for this  · Reviewed estradiol, serum FSH, CBC and CMP: Patient is now postmenopausal  · Continue Prolia Prolia every 6 months: Beginning to have insurance issues.  Review bone density September 2022 and make decisions.  May need referral to the orthopedic osteoporosis clinic  · Bone density will be due September 30, 2022.  Order has been placed  · She will continue continue Os-Arthur D twice a day  · Monthly breast self exams and call for lumps, nipple discharge, skin discoloration or breast pain  · Follow-up with me in 6 months or earlier as needed  · Note given for work adjustments due to arthritic symptoms in her finger joints  · All questions answered to the best of my  abilities        Patient verbalized understanding and is in agreement of the above plan.            Thank you very much for allowing me to participate in the care of Sneha, I will keep you updated on her progress           I spent 30 total minutes, face-to-face, caring for Angy today.  90% of this time involved counseling and/or coordination of care as documented within this note.

## 2022-04-21 ENCOUNTER — LAB (OUTPATIENT)
Dept: LAB | Facility: HOSPITAL | Age: 57
End: 2022-04-21

## 2022-04-21 ENCOUNTER — OFFICE VISIT (OUTPATIENT)
Dept: ONCOLOGY | Facility: CLINIC | Age: 57
End: 2022-04-21

## 2022-04-21 VITALS
WEIGHT: 172.6 LBS | BODY MASS INDEX: 28.76 KG/M2 | HEIGHT: 65 IN | HEART RATE: 105 BPM | RESPIRATION RATE: 18 BRPM | DIASTOLIC BLOOD PRESSURE: 75 MMHG | TEMPERATURE: 96.9 F | OXYGEN SATURATION: 96 % | SYSTOLIC BLOOD PRESSURE: 103 MMHG

## 2022-04-21 DIAGNOSIS — Z78.0 POSTMENOPAUSAL: ICD-10-CM

## 2022-04-21 DIAGNOSIS — C50.919 MALIGNANT NEOPLASM OF FEMALE BREAST, UNSPECIFIED ESTROGEN RECEPTOR STATUS, UNSPECIFIED LATERALITY, UNSPECIFIED SITE OF BREAST: Primary | ICD-10-CM

## 2022-04-21 LAB
ALBUMIN SERPL-MCNC: 4.5 G/DL (ref 3.5–5.2)
ALBUMIN/GLOB SERPL: 1.3 G/DL
ALP SERPL-CCNC: 120 U/L (ref 39–117)
ALT SERPL W P-5'-P-CCNC: 16 U/L (ref 1–33)
ANION GAP SERPL CALCULATED.3IONS-SCNC: 12 MMOL/L (ref 5–15)
AST SERPL-CCNC: 21 U/L (ref 1–32)
BASOPHILS # BLD AUTO: 0.02 10*3/MM3 (ref 0–0.2)
BASOPHILS NFR BLD AUTO: 0.3 % (ref 0–1.5)
BILIRUB SERPL-MCNC: 0.3 MG/DL (ref 0–1.2)
BUN SERPL-MCNC: 18 MG/DL (ref 6–20)
BUN/CREAT SERPL: 20 (ref 7–25)
CALCIUM SPEC-SCNC: 10.1 MG/DL (ref 8.6–10.5)
CHLORIDE SERPL-SCNC: 101 MMOL/L (ref 98–107)
CO2 SERPL-SCNC: 28 MMOL/L (ref 22–29)
CREAT SERPL-MCNC: 0.9 MG/DL (ref 0.57–1)
DEPRECATED RDW RBC AUTO: 39.6 FL (ref 37–54)
EGFRCR SERPLBLD CKD-EPI 2021: 75.2 ML/MIN/1.73
EOSINOPHIL # BLD AUTO: 0.15 10*3/MM3 (ref 0–0.4)
EOSINOPHIL NFR BLD AUTO: 2.4 % (ref 0.3–6.2)
ERYTHROCYTE [DISTWIDTH] IN BLOOD BY AUTOMATED COUNT: 11.9 % (ref 12.3–15.4)
GLOBULIN UR ELPH-MCNC: 3.6 GM/DL
GLUCOSE SERPL-MCNC: 94 MG/DL (ref 65–99)
HCT VFR BLD AUTO: 40.8 % (ref 34–46.6)
HGB BLD-MCNC: 13.8 G/DL (ref 12–15.9)
HOLD SPECIMEN: NORMAL
HOLD SPECIMEN: NORMAL
LYMPHOCYTES # BLD AUTO: 1.52 10*3/MM3 (ref 0.7–3.1)
LYMPHOCYTES NFR BLD AUTO: 24 % (ref 19.6–45.3)
MCH RBC QN AUTO: 31.4 PG (ref 26.6–33)
MCHC RBC AUTO-ENTMCNC: 33.8 G/DL (ref 31.5–35.7)
MCV RBC AUTO: 92.9 FL (ref 79–97)
MONOCYTES # BLD AUTO: 0.52 10*3/MM3 (ref 0.1–0.9)
MONOCYTES NFR BLD AUTO: 8.2 % (ref 5–12)
NEUTROPHILS NFR BLD AUTO: 4.13 10*3/MM3 (ref 1.7–7)
NEUTROPHILS NFR BLD AUTO: 65.1 % (ref 42.7–76)
PLATELET # BLD AUTO: 227 10*3/MM3 (ref 140–450)
PMV BLD AUTO: 9.6 FL (ref 6–12)
POTASSIUM SERPL-SCNC: 3.8 MMOL/L (ref 3.5–5.2)
PROT SERPL-MCNC: 8.1 G/DL (ref 6–8.5)
RBC # BLD AUTO: 4.39 10*6/MM3 (ref 3.77–5.28)
SODIUM SERPL-SCNC: 141 MMOL/L (ref 136–145)
WBC NRBC COR # BLD: 6.34 10*3/MM3 (ref 3.4–10.8)

## 2022-04-21 PROCEDURE — 85025 COMPLETE CBC W/AUTO DIFF WBC: CPT | Performed by: INTERNAL MEDICINE

## 2022-04-21 PROCEDURE — 99214 OFFICE O/P EST MOD 30 MIN: CPT | Performed by: INTERNAL MEDICINE

## 2022-04-21 PROCEDURE — 80053 COMPREHEN METABOLIC PANEL: CPT

## 2022-04-21 PROCEDURE — 36415 COLL VENOUS BLD VENIPUNCTURE: CPT | Performed by: INTERNAL MEDICINE

## 2022-04-21 RX ORDER — ALENDRONATE SODIUM 70 MG/1
70 TABLET ORAL
Qty: 4 TABLET | Refills: 11 | Status: SHIPPED | OUTPATIENT
Start: 2022-04-21 | End: 2022-05-23

## 2022-04-21 RX ORDER — DICYCLOMINE HCL 20 MG
20 TABLET ORAL 2 TIMES DAILY
COMMUNITY
Start: 2022-02-15 | End: 2022-08-16 | Stop reason: HOSPADM

## 2022-04-21 RX ORDER — LEVOTHYROXINE SODIUM 0.05 MG/1
50 TABLET ORAL DAILY
COMMUNITY
Start: 2022-02-24 | End: 2022-08-16

## 2022-05-03 ENCOUNTER — HOSPITAL ENCOUNTER (OUTPATIENT)
Dept: NUCLEAR MEDICINE | Facility: HOSPITAL | Age: 57
Discharge: HOME | End: 2022-05-03

## 2022-05-03 DIAGNOSIS — C50.919 MALIGNANT NEOPLASM OF FEMALE BREAST, UNSPECIFIED ESTROGEN RECEPTOR STATUS, UNSPECIFIED LATERALITY, UNSPECIFIED SITE OF BREAST: ICD-10-CM

## 2022-05-03 PROCEDURE — A9503 TC99M MEDRONATE: HCPCS | Performed by: INTERNAL MEDICINE

## 2022-05-03 PROCEDURE — 78306 BONE IMAGING WHOLE BODY: CPT

## 2022-05-03 PROCEDURE — 0 TECHNETIUM MEDRONATE KIT: Performed by: INTERNAL MEDICINE

## 2022-05-03 RX ORDER — TC 99M MEDRONATE 20 MG/10ML
24.9 INJECTION, POWDER, LYOPHILIZED, FOR SOLUTION INTRAVENOUS
Status: COMPLETED | OUTPATIENT
Start: 2022-05-03 | End: 2022-05-03

## 2022-05-03 RX ADMIN — TC 99M MEDRONATE 24.9 MILLICURIE: 20 INJECTION, POWDER, LYOPHILIZED, FOR SOLUTION INTRAVENOUS at 09:47

## 2022-05-23 RX ORDER — ALENDRONATE SODIUM 70 MG/1
70 TABLET ORAL
Qty: 4 TABLET | Refills: 11 | Status: SHIPPED | OUTPATIENT
Start: 2022-05-23

## 2022-05-27 ENCOUNTER — TELEPHONE (OUTPATIENT)
Dept: ONCOLOGY | Facility: CLINIC | Age: 57
End: 2022-05-27

## 2022-05-27 NOTE — TELEPHONE ENCOUNTER
Left message for patient that MD would like to continue her prolia injections and to call back to schedule.

## 2022-05-27 NOTE — TELEPHONE ENCOUNTER
Caller: Angy Davies    Relationship: Self    Best call back number: [phone]    Who are you requesting to speak with (clinical staff, provider,  specific staff member):CLINICAL    What was the call regarding: PATIENT CALLING FOR BONE SCAN RESULTS AND AN UPDATE REGARDING PROLIA INJECTIONS    Do you require a callback: YES

## 2022-06-07 ENCOUNTER — HOSPITAL ENCOUNTER (OUTPATIENT)
Dept: ONCOLOGY | Facility: HOSPITAL | Age: 57
Setting detail: INFUSION SERIES
Discharge: HOME | End: 2022-06-07

## 2022-06-07 VITALS
BODY MASS INDEX: 28.56 KG/M2 | HEART RATE: 83 BPM | RESPIRATION RATE: 18 BRPM | DIASTOLIC BLOOD PRESSURE: 72 MMHG | HEIGHT: 65 IN | SYSTOLIC BLOOD PRESSURE: 113 MMHG | TEMPERATURE: 97.1 F | WEIGHT: 171.4 LBS

## 2022-06-07 DIAGNOSIS — C50.919 MALIGNANT NEOPLASM OF FEMALE BREAST, UNSPECIFIED ESTROGEN RECEPTOR STATUS, UNSPECIFIED LATERALITY, UNSPECIFIED SITE OF BREAST: Primary | ICD-10-CM

## 2022-06-07 LAB
ALBUMIN SERPL-MCNC: 4.4 G/DL (ref 3.5–5.2)
ALBUMIN/GLOB SERPL: 1.5 G/DL
ALP SERPL-CCNC: 111 U/L (ref 39–117)
ALT SERPL W P-5'-P-CCNC: 14 U/L (ref 1–33)
ANION GAP SERPL CALCULATED.3IONS-SCNC: 11 MMOL/L (ref 5–15)
AST SERPL-CCNC: 18 U/L (ref 1–32)
BASOPHILS # BLD AUTO: 0.01 10*3/MM3 (ref 0–0.2)
BASOPHILS NFR BLD AUTO: 0.2 % (ref 0–1.5)
BILIRUB SERPL-MCNC: 0.3 MG/DL (ref 0–1.2)
BUN SERPL-MCNC: 20 MG/DL (ref 6–20)
BUN/CREAT SERPL: 25.3 (ref 7–25)
CALCIUM SPEC-SCNC: 9.7 MG/DL (ref 8.6–10.5)
CHLORIDE SERPL-SCNC: 102 MMOL/L (ref 98–107)
CO2 SERPL-SCNC: 27 MMOL/L (ref 22–29)
CREAT SERPL-MCNC: 0.79 MG/DL (ref 0.57–1)
DEPRECATED RDW RBC AUTO: 42.2 FL (ref 37–54)
EGFRCR SERPLBLD CKD-EPI 2021: 87.9 ML/MIN/1.73
EOSINOPHIL # BLD AUTO: 0.13 10*3/MM3 (ref 0–0.4)
EOSINOPHIL NFR BLD AUTO: 2.2 % (ref 0.3–6.2)
ERYTHROCYTE [DISTWIDTH] IN BLOOD BY AUTOMATED COUNT: 12.4 % (ref 12.3–15.4)
GLOBULIN UR ELPH-MCNC: 2.9 GM/DL
GLUCOSE SERPL-MCNC: 95 MG/DL (ref 65–99)
HCT VFR BLD AUTO: 38.9 % (ref 34–46.6)
HGB BLD-MCNC: 12.8 G/DL (ref 12–15.9)
LYMPHOCYTES # BLD AUTO: 1.02 10*3/MM3 (ref 0.7–3.1)
LYMPHOCYTES NFR BLD AUTO: 17.6 % (ref 19.6–45.3)
MAGNESIUM SERPL-MCNC: 1.7 MG/DL (ref 1.6–2.6)
MCH RBC QN AUTO: 31.4 PG (ref 26.6–33)
MCHC RBC AUTO-ENTMCNC: 32.9 G/DL (ref 31.5–35.7)
MCV RBC AUTO: 95.3 FL (ref 79–97)
MONOCYTES # BLD AUTO: 0.6 10*3/MM3 (ref 0.1–0.9)
MONOCYTES NFR BLD AUTO: 10.4 % (ref 5–12)
NEUTROPHILS NFR BLD AUTO: 4.02 10*3/MM3 (ref 1.7–7)
NEUTROPHILS NFR BLD AUTO: 69.6 % (ref 42.7–76)
PHOSPHATE SERPL-MCNC: 3.1 MG/DL (ref 2.5–4.5)
PLATELET # BLD AUTO: 197 10*3/MM3 (ref 140–450)
PMV BLD AUTO: 10.3 FL (ref 6–12)
POTASSIUM SERPL-SCNC: 4 MMOL/L (ref 3.5–5.2)
PROT SERPL-MCNC: 7.3 G/DL (ref 6–8.5)
RBC # BLD AUTO: 4.08 10*6/MM3 (ref 3.77–5.28)
SODIUM SERPL-SCNC: 140 MMOL/L (ref 136–145)
WBC NRBC COR # BLD: 5.78 10*3/MM3 (ref 3.4–10.8)

## 2022-06-07 PROCEDURE — 84100 ASSAY OF PHOSPHORUS: CPT | Performed by: INTERNAL MEDICINE

## 2022-06-07 PROCEDURE — 96372 THER/PROPH/DIAG INJ SC/IM: CPT

## 2022-06-07 PROCEDURE — 25010000002 DENOSUMAB 60 MG/ML SOLUTION PREFILLED SYRINGE: Performed by: INTERNAL MEDICINE

## 2022-06-07 PROCEDURE — 85025 COMPLETE CBC W/AUTO DIFF WBC: CPT | Performed by: INTERNAL MEDICINE

## 2022-06-07 PROCEDURE — 83735 ASSAY OF MAGNESIUM: CPT | Performed by: INTERNAL MEDICINE

## 2022-06-07 PROCEDURE — 80053 COMPREHEN METABOLIC PANEL: CPT | Performed by: INTERNAL MEDICINE

## 2022-06-07 RX ADMIN — DENOSUMAB 60 MG: 60 INJECTION SUBCUTANEOUS at 15:25

## 2022-06-07 NOTE — PROGRESS NOTES
Pt here for prolia injection she c/o joint pain and this is something Dr. Harris is aware of she ordered scans done she states the injections help her but they don't last as long as they used to.Blood was drawn via vein and sent to lab for processing. Pt discharged home via ambulation.

## 2022-07-13 ENCOUNTER — TELEPHONE (OUTPATIENT)
Dept: ONCOLOGY | Facility: CLINIC | Age: 57
End: 2022-07-13

## 2022-07-13 NOTE — TELEPHONE ENCOUNTER
Caller: RENETTA    Relationship to patient: SELF    Best call back number: [phone]    Patient is needing: TO R/S 10- F/U AND LAB TO 10- OR 10-. IF THERE ARE NO OPENINGS THAT WEEK, SHE CAN DO IT THE FOLLOWING WEEK.

## 2022-08-05 ENCOUNTER — TELEPHONE (OUTPATIENT)
Dept: ONCOLOGY | Facility: CLINIC | Age: 57
End: 2022-08-05

## 2022-08-08 ENCOUNTER — TELEPHONE (OUTPATIENT)
Dept: ONCOLOGY | Facility: CLINIC | Age: 57
End: 2022-08-08

## 2022-08-15 ENCOUNTER — APPOINTMENT (OUTPATIENT)
Dept: GENERAL RADIOLOGY | Facility: HOSPITAL | Age: 57
End: 2022-08-15

## 2022-08-15 ENCOUNTER — HOSPITAL ENCOUNTER (OUTPATIENT)
Facility: HOSPITAL | Age: 57
Setting detail: OBSERVATION
Discharge: HOME | End: 2022-08-16
Attending: EMERGENCY MEDICINE | Admitting: INTERNAL MEDICINE

## 2022-08-15 DIAGNOSIS — I20.8 ANGINAL EQUIVALENT: ICD-10-CM

## 2022-08-15 DIAGNOSIS — M79.602 LEFT ARM PAIN: Primary | ICD-10-CM

## 2022-08-15 LAB
ANION GAP SERPL CALCULATED.3IONS-SCNC: 11 MMOL/L (ref 5–15)
BASOPHILS # BLD AUTO: 0 10*3/MM3 (ref 0–0.2)
BASOPHILS NFR BLD AUTO: 0.5 % (ref 0–1.5)
BUN SERPL-MCNC: 18 MG/DL (ref 6–20)
BUN/CREAT SERPL: 20.5 (ref 7–25)
CALCIUM SPEC-SCNC: 9.8 MG/DL (ref 8.6–10.5)
CHLORIDE SERPL-SCNC: 103 MMOL/L (ref 98–107)
CO2 SERPL-SCNC: 28 MMOL/L (ref 22–29)
CREAT SERPL-MCNC: 0.88 MG/DL (ref 0.57–1)
DEPRECATED RDW RBC AUTO: 42 FL (ref 37–54)
EGFRCR SERPLBLD CKD-EPI 2021: 77.2 ML/MIN/1.73
EOSINOPHIL # BLD AUTO: 0.1 10*3/MM3 (ref 0–0.4)
EOSINOPHIL NFR BLD AUTO: 1.8 % (ref 0.3–6.2)
ERYTHROCYTE [DISTWIDTH] IN BLOOD BY AUTOMATED COUNT: 13 % (ref 12.3–15.4)
GLUCOSE SERPL-MCNC: 100 MG/DL (ref 65–99)
HCT VFR BLD AUTO: 40.5 % (ref 34–46.6)
HGB BLD-MCNC: 13.5 G/DL (ref 12–15.9)
HOLD SPECIMEN: NORMAL
LYMPHOCYTES # BLD AUTO: 1.7 10*3/MM3 (ref 0.7–3.1)
LYMPHOCYTES NFR BLD AUTO: 25.4 % (ref 19.6–45.3)
MAGNESIUM SERPL-MCNC: 1.9 MG/DL (ref 1.6–2.6)
MCH RBC QN AUTO: 30.5 PG (ref 26.6–33)
MCHC RBC AUTO-ENTMCNC: 33.4 G/DL (ref 31.5–35.7)
MCV RBC AUTO: 91.5 FL (ref 79–97)
MONOCYTES # BLD AUTO: 0.4 10*3/MM3 (ref 0.1–0.9)
MONOCYTES NFR BLD AUTO: 6.6 % (ref 5–12)
NEUTROPHILS NFR BLD AUTO: 4.4 10*3/MM3 (ref 1.7–7)
NEUTROPHILS NFR BLD AUTO: 65.7 % (ref 42.7–76)
NRBC BLD AUTO-RTO: 0.1 /100 WBC (ref 0–0.2)
PLATELET # BLD AUTO: 216 10*3/MM3 (ref 140–450)
PMV BLD AUTO: 8.1 FL (ref 6–12)
POTASSIUM SERPL-SCNC: 3.6 MMOL/L (ref 3.5–5.2)
RBC # BLD AUTO: 4.43 10*6/MM3 (ref 3.77–5.28)
SODIUM SERPL-SCNC: 142 MMOL/L (ref 136–145)
TROPONIN T SERPL-MCNC: <0.01 NG/ML (ref 0–0.03)
TSH SERPL DL<=0.05 MIU/L-ACNC: 1.27 UIU/ML (ref 0.27–4.2)
WBC NRBC COR # BLD: 6.6 10*3/MM3 (ref 3.4–10.8)
WHOLE BLOOD HOLD COAG: NORMAL
WHOLE BLOOD HOLD SPECIMEN: NORMAL

## 2022-08-15 PROCEDURE — 80048 BASIC METABOLIC PNL TOTAL CA: CPT | Performed by: EMERGENCY MEDICINE

## 2022-08-15 PROCEDURE — 93005 ELECTROCARDIOGRAM TRACING: CPT | Performed by: EMERGENCY MEDICINE

## 2022-08-15 PROCEDURE — 71045 X-RAY EXAM CHEST 1 VIEW: CPT

## 2022-08-15 PROCEDURE — 83735 ASSAY OF MAGNESIUM: CPT | Performed by: EMERGENCY MEDICINE

## 2022-08-15 PROCEDURE — 99284 EMERGENCY DEPT VISIT MOD MDM: CPT

## 2022-08-15 PROCEDURE — 93005 ELECTROCARDIOGRAM TRACING: CPT

## 2022-08-15 PROCEDURE — 85025 COMPLETE CBC W/AUTO DIFF WBC: CPT | Performed by: EMERGENCY MEDICINE

## 2022-08-15 PROCEDURE — 84443 ASSAY THYROID STIM HORMONE: CPT | Performed by: EMERGENCY MEDICINE

## 2022-08-15 PROCEDURE — 84484 ASSAY OF TROPONIN QUANT: CPT | Performed by: EMERGENCY MEDICINE

## 2022-08-15 RX ORDER — ASPIRIN 325 MG
325 TABLET ORAL ONCE
Status: COMPLETED | OUTPATIENT
Start: 2022-08-15 | End: 2022-08-15

## 2022-08-15 RX ORDER — SODIUM CHLORIDE 0.9 % (FLUSH) 0.9 %
3 SYRINGE (ML) INJECTION EVERY 12 HOURS SCHEDULED
Status: DISCONTINUED | OUTPATIENT
Start: 2022-08-15 | End: 2022-08-16 | Stop reason: HOSPADM

## 2022-08-15 RX ORDER — ONDANSETRON 4 MG/1
4 TABLET, FILM COATED ORAL EVERY 6 HOURS PRN
Status: DISCONTINUED | OUTPATIENT
Start: 2022-08-15 | End: 2022-08-16 | Stop reason: HOSPADM

## 2022-08-15 RX ORDER — ONDANSETRON 2 MG/ML
4 INJECTION INTRAMUSCULAR; INTRAVENOUS EVERY 6 HOURS PRN
Status: DISCONTINUED | OUTPATIENT
Start: 2022-08-15 | End: 2022-08-16 | Stop reason: HOSPADM

## 2022-08-15 RX ORDER — ENOXAPARIN SODIUM 100 MG/ML
40 INJECTION SUBCUTANEOUS DAILY
Status: DISCONTINUED | OUTPATIENT
Start: 2022-08-16 | End: 2022-08-16 | Stop reason: HOSPADM

## 2022-08-15 RX ORDER — SODIUM CHLORIDE 0.9 % (FLUSH) 0.9 %
3-10 SYRINGE (ML) INJECTION AS NEEDED
Status: DISCONTINUED | OUTPATIENT
Start: 2022-08-15 | End: 2022-08-16 | Stop reason: HOSPADM

## 2022-08-15 RX ORDER — SODIUM CHLORIDE 9 MG/ML
125 INJECTION, SOLUTION INTRAVENOUS CONTINUOUS
Status: DISCONTINUED | OUTPATIENT
Start: 2022-08-15 | End: 2022-08-16

## 2022-08-15 RX ORDER — ACETAMINOPHEN 325 MG/1
650 TABLET ORAL EVERY 4 HOURS PRN
Status: DISCONTINUED | OUTPATIENT
Start: 2022-08-15 | End: 2022-08-16 | Stop reason: HOSPADM

## 2022-08-15 RX ORDER — HYDRALAZINE HYDROCHLORIDE 20 MG/ML
10 INJECTION INTRAMUSCULAR; INTRAVENOUS EVERY 6 HOURS PRN
Status: DISCONTINUED | OUTPATIENT
Start: 2022-08-15 | End: 2022-08-16 | Stop reason: HOSPADM

## 2022-08-15 RX ORDER — SODIUM CHLORIDE 0.9 % (FLUSH) 0.9 %
10 SYRINGE (ML) INJECTION AS NEEDED
Status: DISCONTINUED | OUTPATIENT
Start: 2022-08-15 | End: 2022-08-16 | Stop reason: HOSPADM

## 2022-08-15 RX ADMIN — Medication 3 ML: at 23:42

## 2022-08-15 RX ADMIN — ASPIRIN 325 MG ORAL TABLET 325 MG: 325 PILL ORAL at 23:42

## 2022-08-15 RX ADMIN — SODIUM CHLORIDE 125 ML/HR: 9 INJECTION, SOLUTION INTRAVENOUS at 23:42

## 2022-08-16 ENCOUNTER — APPOINTMENT (OUTPATIENT)
Dept: NUCLEAR MEDICINE | Facility: HOSPITAL | Age: 57
End: 2022-08-16

## 2022-08-16 VITALS
HEIGHT: 65 IN | BODY MASS INDEX: 28.03 KG/M2 | SYSTOLIC BLOOD PRESSURE: 110 MMHG | HEART RATE: 72 BPM | DIASTOLIC BLOOD PRESSURE: 86 MMHG | TEMPERATURE: 98.2 F | OXYGEN SATURATION: 97 % | WEIGHT: 168.21 LBS | RESPIRATION RATE: 15 BRPM

## 2022-08-16 PROBLEM — I20.89 ANGINAL EQUIVALENT: Status: ACTIVE | Noted: 2022-08-16

## 2022-08-16 PROBLEM — M79.602 LEFT ARM PAIN: Status: ACTIVE | Noted: 2022-08-16

## 2022-08-16 PROBLEM — I20.8 ANGINAL EQUIVALENT (HCC): Status: ACTIVE | Noted: 2022-08-16

## 2022-08-16 LAB
BH CV REST NUCLEAR ISOTOPE DOSE: 11 MCI
BH CV STRESS BP STAGE 1: NORMAL
BH CV STRESS DURATION MIN STAGE 1: 6
BH CV STRESS DURATION SEC STAGE 1: 0
BH CV STRESS GRADE STAGE 1: 10
BH CV STRESS HR STAGE 1: 144
BH CV STRESS METS STAGE 1: 5
BH CV STRESS NUCLEAR ISOTOPE DOSE: 28.1 MCI
BH CV STRESS PROTOCOL 1: NORMAL
BH CV STRESS RECOVERY BP: NORMAL MMHG
BH CV STRESS RECOVERY HR: 115 BPM
BH CV STRESS SPEED STAGE 1: 1.7
BH CV STRESS STAGE 1: 1
LV EF NUC BP: 68 %
MAXIMAL PREDICTED HEART RATE: 164 BPM
PERCENT MAX PREDICTED HR: 91.46 %
QT INTERVAL: 364 MS
SARS-COV-2 RNA RESP QL NAA+PROBE: NOT DETECTED
STRESS BASELINE BP: NORMAL MMHG
STRESS BASELINE HR: 108 BPM
STRESS PERCENT HR: 108 %
STRESS POST ESTIMATED WORKLOAD: 4.6 METS
STRESS POST EXERCISE DUR MIN: 6 MIN
STRESS POST EXERCISE DUR SEC: 1 SEC
STRESS POST PEAK BP: NORMAL MMHG
STRESS POST PEAK HR: 150 BPM
STRESS TARGET HR: 139 BPM
TROPONIN T SERPL-MCNC: <0.01 NG/ML (ref 0–0.03)

## 2022-08-16 PROCEDURE — U0003 INFECTIOUS AGENT DETECTION BY NUCLEIC ACID (DNA OR RNA); SEVERE ACUTE RESPIRATORY SYNDROME CORONAVIRUS 2 (SARS-COV-2) (CORONAVIRUS DISEASE [COVID-19]), AMPLIFIED PROBE TECHNIQUE, MAKING USE OF HIGH THROUGHPUT TECHNOLOGIES AS DESCRIBED BY CMS-2020-01-R: HCPCS | Performed by: EMERGENCY MEDICINE

## 2022-08-16 PROCEDURE — 36415 COLL VENOUS BLD VENIPUNCTURE: CPT | Performed by: EMERGENCY MEDICINE

## 2022-08-16 PROCEDURE — G0378 HOSPITAL OBSERVATION PER HR: HCPCS

## 2022-08-16 PROCEDURE — C9803 HOPD COVID-19 SPEC COLLECT: HCPCS | Performed by: EMERGENCY MEDICINE

## 2022-08-16 PROCEDURE — 84484 ASSAY OF TROPONIN QUANT: CPT | Performed by: EMERGENCY MEDICINE

## 2022-08-16 PROCEDURE — 78452 HT MUSCLE IMAGE SPECT MULT: CPT | Performed by: INTERNAL MEDICINE

## 2022-08-16 PROCEDURE — 78452 HT MUSCLE IMAGE SPECT MULT: CPT

## 2022-08-16 PROCEDURE — 93018 CV STRESS TEST I&R ONLY: CPT | Performed by: INTERNAL MEDICINE

## 2022-08-16 PROCEDURE — A9502 TC99M TETROFOSMIN: HCPCS | Performed by: INTERNAL MEDICINE

## 2022-08-16 PROCEDURE — 0 TECHNETIUM TETROFOSMIN KIT: Performed by: INTERNAL MEDICINE

## 2022-08-16 PROCEDURE — 93017 CV STRESS TEST TRACING ONLY: CPT

## 2022-08-16 RX ORDER — LISINOPRIL 20 MG/1
20 TABLET ORAL
Status: DISCONTINUED | OUTPATIENT
Start: 2022-08-16 | End: 2022-08-16 | Stop reason: HOSPADM

## 2022-08-16 RX ORDER — DICYCLOMINE HYDROCHLORIDE 10 MG/1
20 CAPSULE ORAL 3 TIMES DAILY
Status: DISCONTINUED | OUTPATIENT
Start: 2022-08-16 | End: 2022-08-16 | Stop reason: HOSPADM

## 2022-08-16 RX ADMIN — DICYCLOMINE HYDROCHLORIDE 20 MG: 10 CAPSULE ORAL at 17:30

## 2022-08-16 RX ADMIN — Medication 3 ML: at 11:22

## 2022-08-16 RX ADMIN — TETROFOSMIN 1 DOSE: 1.38 INJECTION, POWDER, LYOPHILIZED, FOR SOLUTION INTRAVENOUS at 11:12

## 2022-08-16 RX ADMIN — DICYCLOMINE HYDROCHLORIDE 20 MG: 10 CAPSULE ORAL at 11:18

## 2022-08-16 RX ADMIN — TETROFOSMIN 1 DOSE: 1.38 INJECTION, POWDER, LYOPHILIZED, FOR SOLUTION INTRAVENOUS at 12:00

## 2022-08-16 NOTE — CASE MANAGEMENT/SOCIAL WORK
Discharge Planning Assessment  [facility]     Patient Name: Angy Davies  MRN: [id number]  Today's Date: 8/16/2022    Admit Date: 8/15/2022     Discharge Needs Assessment     Row Name 08/16/22 1605       Living Environment    People in Home spouse    Name(s) of People in Home Spouse- Speedy    Current Living Arrangements home    Primary Care Provided by self    Provides Primary Care For no one    Family Caregiver if Needed spouse    Family Caregiver Names Speedy    Quality of Family Relationships supportive;involved;helpful    Able to Return to Prior Arrangements yes       Resource/Environmental Concerns    Resource/Environmental Concerns none    Transportation Concerns none       Transition Planning    Patient/Family Anticipates Transition to home with family    Patient/Family Anticipated Services at Transition none    Transportation Anticipated family or friend will provide       Discharge Needs Assessment    Readmission Within the Last 30 Days no previous admission in last 30 days    Equipment Currently Used at Home none    Concerns to be Addressed denies needs/concerns at this time    Anticipated Changes Related to Illness none    Equipment Needed After Discharge none               Discharge Plan     Row Name 08/16/22 1606       Plan    Plan DC Plan: Anticipate routine home    Patient/Family in Agreement with Plan yes    Plan Comments The patient reports that she lives with her spouse. PCP and Pharmacy verified. She denies any current DME/HH/PT. She reports that she is IADL's at home. She denies any discharge needs or transportation issues. DC Barrier : stress test results pending           Expected Discharge Date and Time     Expected Discharge Date Expected Discharge Time    Aug 17, 2022          Demographic Summary     Row Name 08/16/22 1605       General Information    Admission Type observation    Arrived From emergency department    Referral Source admission list    Reason for Consult discharge planning     Preferred Language English       Contact Information    Permission Granted to Share Info With                Functional Status     Row Name 08/16/22 1605       Functional Status    Usual Activity Tolerance good    Current Activity Tolerance good       Functional Status, IADL    Medications independent    Meal Preparation independent    Housekeeping independent    Laundry independent    Shopping independent       Mental Status    General Appearance WDL WDL       Mental Status Summary    Recent Changes in Mental Status/Cognitive Functioning no changes         Met with patient in room wearing PPE: mask/googles    Maintained distance greater than 6 feet and spent less than 15 minutes in the room.     Taylor Cunningham RN     Office Phone: [phone]  Office Cell: [phone]

## 2022-08-16 NOTE — ED NOTES
Pt states she went to her work clinic who told her she had hypertension and tachycardia. Pt denies chest pain but states her L arm has been hurting her, radiating down the entire arm.

## 2022-08-16 NOTE — ED PROVIDER NOTES
Subjective   Chief complaint elevated blood pressure aching in left arm.    History of present illness this is a 56-year-old female with a history of hypertension who states that she was went to work tonight and she developed a pain in her left arm which was aching in nature radiated up and down her arm some shortness as well.  She went to the medical office and they took her blood pressure told it was high although they did not tell her how high and she was sent to the ER.  She has no current pain in her arm.  She states it was throbbing aching nature and somewhat sharp on the inside aspect.  And has since gone away.  There was mild to moderate lasted several minutes and now resolved.  Nothing made it better or worse.  Still been nausea and shortness of breath but no chest pain.  No dizziness or syncope.  No paralysis or weakness no fever chills.  No injury.  No other complaints or associated complaints.          Review of Systems   Constitutional: Negative for chills and fever.   HENT: Negative for congestion and sinus pressure.    Eyes: Negative for photophobia and visual disturbance.   Respiratory: Positive for shortness of breath. Negative for chest tightness.    Cardiovascular: Negative for chest pain and leg swelling.   Gastrointestinal: Negative for abdominal pain and vomiting.   Endocrine: Negative for cold intolerance and heat intolerance.   Genitourinary: Negative for difficulty urinating and dysuria.   Musculoskeletal: Negative for back pain and neck pain.   Skin: Negative for color change and rash.   Neurological: Negative for dizziness and light-headedness.   Psychiatric/Behavioral: Negative for agitation and behavioral problems.       Past Medical History:   Diagnosis Date   • Allergic    • Arthritis    • Asthma    • Breast cancer (HCC)    • Cataract    • Chronic diarrhea    • Headache    • Hx of radiation therapy    • Injury of back    • Osteoporosis        Allergies   Allergen Reactions   •  Penicillins GI Intolerance   • Letrozole Rash       Past Surgical History:   Procedure Laterality Date   • BREAST BIOPSY     • BREAST LUMPECTOMY     • HYSTERECTOMY         Family History   Problem Relation Age of Onset   • Breast cancer Maternal Grandmother    • Ovarian cancer Mother        Social History     Socioeconomic History   • Marital status:    Tobacco Use   • Smoking status: Former Smoker     Years: 2.00   • Smokeless tobacco: Never Used   Substance and Sexual Activity   • Alcohol use: Yes     Comment: rarely   • Drug use: Never   • Sexual activity: Defer     Prior to Admission medications    Medication Sig Start Date End Date Taking? Authorizing Provider   alendronate (FOSAMAX) 70 MG tablet TAKE 1 TABLET BY MOUTH EVERY 7 (SEVEN) DAYS. TAKE WITH A FULL GLASS OF WATER ON AN EMPTY STOMACH IN THE MORNING, STAY IN AN UPRIGHT POSITION FOR AT LEAST ONE HOUR AFTER TAKING THIS MEDICATION. 5/23/22   Juliette Harris MD   Calcium Carb-Cholecalciferol (Os-Arthur Calcium + D3) 500-200 MG-UNIT tablet Take  by mouth.    Sharri Coto MD   cyclobenzaprine (FLEXERIL) 10 MG tablet TAKE ONE TABLET 2 TIMES A DAY AS NEEDED 9/5/20   Sharri Coto MD   denosumab (Prolia) 60 MG/ML solution prefilled syringe syringe     Sharri Coto MD   dicyclomine (BENTYL) 20 MG tablet Take 20 mg by mouth 2 (Two) Times a Day. 2/15/22   Sharri Coto MD   diphenhydrAMINE-acetaminophen (TYLENOL PM)  MG tablet per tablet Take  by mouth.    Sharri Coto MD   EC-Naproxen 500 MG EC tablet Take 500 mg by mouth 2 (Two) Times a Day With Meals. 4/9/21   Sharri Coto MD   ferrous sulfate 324 (65 Fe) MG tablet delayed-release EC tablet Take 324 mg by mouth Daily With Breakfast.    Sharri Coto MD   furosemide (LASIX) 40 MG tablet Take 40 mg by mouth Daily. 8/25/20   Sharri Coto MD   K-Phos 500 MG tablet Take 500 mg by mouth 2 (Two) Times a Day. 6/4/21   Nnamdi  MD Sharri   letrozole (FEMARA) 2.5 MG tablet TAKE 1 TABLET BY MOUTH EVERY DAY 4/11/22   Juliette Harris MD   levothyroxine (SYNTHROID, LEVOTHROID) 25 MCG tablet  10/18/21   Sharri Coto MD   levothyroxine (SYNTHROID, LEVOTHROID) 50 MCG tablet Take 50 mcg by mouth Daily. 2/24/22   Sharri Coto MD   lisinopril (PRINIVIL,ZESTRIL) 10 MG tablet Take  by mouth.    Sharri Coto MD   lisinopril-hydrochlorothiazide (PRINZIDE,ZESTORETIC) 10-12.5 MG per tablet Take 1 tablet by mouth Daily. 9/11/20   Sharri Coto MD   methylPREDNISolone (MEDROL) 4 MG dose pack TAKE 6 TABLETS ON DAY 1 AS DIRECTED ON PACKAGE AND DECREASE BY 1 TAB EACH DAY FOR A TOTAL OF 6 DAYS 9/11/20   Sharri Coto MD   multivitamin (THERAGRAN) tablet tablet     Sharri Coto MD   naproxen (NAPROSYN) 500 MG tablet Take 500 mg by mouth 2 (Two) Times a Day With Meals. 9/2/20   Sharri Coto MD   potassium chloride ER (K-TAB) 20 MEQ tablet controlled-release ER tablet Take 20 mEq by mouth Daily. with food 9/5/20   Sharri Coto MD   Specialty Vitamins Products (Menopause Relief) tablet MENOPAUSE RELIEF ORAL TABLET 11/5/12   Sharri Coto MD   SUMAtriptan (IMITREX) 50 MG tablet PLEASE SEE ATTACHED FOR DETAILED DIRECTIONS 1/11/21   Sharri Coto MD   Viberzi 75 MG tablet Take 1 tablet by mouth 2 (Two) Times a Day. 9/9/20   Sharri Coto MD           Objective   Physical Exam  Constitutional 56-year-old awake alert no acute distress.  Temperature 98.7 patient has blood pressure 124/83 heart rate 88 respirations of 18.  HEENT extraocular muscles are intact pupils equal round reactive sclera clear.  Neck supple no adenopathy no JVD no bruits no meningeal signs.  Lungs clear no retraction.  Heart regular without murmur rub.  Abdomen soft without tenderness good bowel sounds no peritoneal findings or pulsatile masses.  Extremities pulses are equal throughout upper and  lower extremities no edema cords or Homans' sign no evidence of DVT.  Examination of left arm is not red hot or swollen she had a brace on for carpal tunnel which we took off hands warm and dry good cap refill  strength normal is no cords no axillary nodes is not red or hot no red hot swollen joints and full range of motion without deformities.  No swelling to the arm.  Pulses are equal throughout the upper extremities as well.  Neurologic awake alert follows commands motor strength normal without focal weakness  Procedures           ED Course      Results for orders placed or performed during the hospital encounter of 08/15/22   Basic Metabolic Panel    Specimen: Blood   Result Value Ref Range    Glucose 100 (H) 65 - 99 mg/dL    BUN 18 6 - 20 mg/dL    Creatinine 0.88 0.57 - 1.00 mg/dL    Sodium 142 136 - 145 mmol/L    Potassium 3.6 3.5 - 5.2 mmol/L    Chloride 103 98 - 107 mmol/L    CO2 28.0 22.0 - 29.0 mmol/L    Calcium 9.8 8.6 - 10.5 mg/dL    BUN/Creatinine Ratio 20.5 7.0 - 25.0    Anion Gap 11.0 5.0 - 15.0 mmol/L    eGFR 77.2 >60.0 mL/min/1.73   Troponin    Specimen: Blood   Result Value Ref Range    Troponin T <0.010 0.000 - 0.030 ng/mL   Magnesium    Specimen: Blood   Result Value Ref Range    Magnesium 1.9 1.6 - 2.6 mg/dL   TSH    Specimen: Blood   Result Value Ref Range    TSH 1.270 0.270 - 4.200 uIU/mL   CBC Auto Differential    Specimen: Blood   Result Value Ref Range    WBC 6.60 3.40 - 10.80 10*3/mm3    RBC 4.43 3.77 - 5.28 10*6/mm3    Hemoglobin 13.5 12.0 - 15.9 g/dL    Hematocrit 40.5 34.0 - 46.6 %    MCV 91.5 79.0 - 97.0 fL    MCH 30.5 26.6 - 33.0 pg    MCHC 33.4 31.5 - 35.7 g/dL    RDW 13.0 12.3 - 15.4 %    RDW-SD 42.0 37.0 - 54.0 fl    MPV 8.1 6.0 - 12.0 fL    Platelets 216 140 - 450 10*3/mm3    Neutrophil % 65.7 42.7 - 76.0 %    Lymphocyte % 25.4 19.6 - 45.3 %    Monocyte % 6.6 5.0 - 12.0 %    Eosinophil % 1.8 0.3 - 6.2 %    Basophil % 0.5 0.0 - 1.5 %    Neutrophils, Absolute 4.40 1.70 - 7.00  10*3/mm3    Lymphocytes, Absolute 1.70 0.70 - 3.10 10*3/mm3    Monocytes, Absolute 0.40 0.10 - 0.90 10*3/mm3    Eosinophils, Absolute 0.10 0.00 - 0.40 10*3/mm3    Basophils, Absolute 0.00 0.00 - 0.20 10*3/mm3    nRBC 0.1 0.0 - 0.2 /100 WBC   ECG 12 Lead   Result Value Ref Range    QT Interval 364 ms   Green Top (Gel)   Result Value Ref Range    Extra Tube Hold for add-ons.    Lavender Top   Result Value Ref Range    Extra Tube hold for add-on    Light Blue Top   Result Value Ref Range    Extra Tube Hold for add-ons.      XR Chest 1 View    Result Date: 8/15/2022  No acute cardiopulmonary abnormality.  Electronically Signed By-Sarah Corona MD On:8/15/2022 9:29 PM This report was finalized on [id number] by  Sarah Corona MD.    Medications   sodium chloride 0.9 % flush 10 mL (has no administration in time range)   aspirin tablet 325 mg (has no administration in time range)          EKG my interpretation normal sinus rhythm rate 88 normal axis hypertrophy QTC of 441 normal EKG                                  MDM  Number of Diagnoses or Management Options  Anginal equivalent (HCC): new and requires workup  Left arm pain: new and requires workup  Diagnosis management comments: Medical decision making.  Patient IV established placed on the monitor EKG obtained reviewed by me showed a sinus rhythm without acute ST elevation patient was given aspirin p.o. chest x-ray obtained reviewed by me as well as radiology unremarkable basic labs obtained CBC electrolytes troponin magnesium TSH all within normal limits.  The patient repeat exam resting company had no further pain.  She does have a family history of heart disease she has a history of hypertension as well her blood pressure is completely normal.  To 115/182 120s over 80s there is been no evidence of extensively elevated blood pressure here in the ER.  I see no evidence of an infection in the no evidence of arterial clot or any evidence of suggest DVT or infection.  No  evidence of acute myocardial infarction no evidence of pulmonary embolism or dissection on clinical exam.  We talked about the findings and we had shared medical decision making about outpatient versus inpatient.  I talked to Concepcion Christianson for Dr. Juarez and the patient will be admitted to the hospital for further work-up and care stress testing and cardiac evaluation for anginal equivalent stable unremarkable ER course.  All labs chest x-ray EKG obtained reviewed by me       Amount and/or Complexity of Data Reviewed  Clinical lab tests: reviewed  Tests in the radiology section of CPT®: reviewed  Discuss the patient with other providers: yes    Risk of Complications, Morbidity, and/or Mortality  Presenting problems: high  Diagnostic procedures: high  Management options: high    Patient Progress  Patient progress: stable      Final diagnoses:   Left arm pain   Anginal equivalent (HCC)       ED Disposition  ED Disposition     ED Disposition   Decision to Admit    Condition   --    Comment   Level of Care: Telemetry [5]   Admitting Physician: HA JUAREZ [[id number]]   Attending Physician: HA JUAREZ [[id number]]               No follow-up provider specified.       Medication List      No changes were made to your prescriptions during this visit.          Gregory Barnes MD  08/16/22 0008

## 2022-08-16 NOTE — PLAN OF CARE
Goal Outcome Evaluation:              Outcome Evaluation: Pt had a stress test done today. Awaiting results. Continuing to monitor patient.

## 2022-08-16 NOTE — CONSULTS
Pt shared an overview of the events that led her into the hospital.  She just returned from a stress test.  Pt was cheerful and feeling good.  She shared about the health of father related to heart issues.  The pt didn't have any needs at this time but was thankful for visit.  I shared our availability.

## 2022-08-16 NOTE — H&P
Patient Care Team:  Germán Spencer FNP as PCP - General    Chief complaint Left arm numbness and tingling    Subjective     Patient is a 56 y.o. female who presents with past medical history hypertension, breast cancer with radiation, family history of coronary artery disease.  Patient states that couple days ago she started with left radiating arm pain.  Not provoked no trauma or injury.  She went to work and continued to have pain with no alleviation and her blood pressure was taken at a clinic during work and she cannot recall what the numbers were but was told to go to the emergency department.  On examination she states there was no alleviation or provoking factors, she denies any chest pain back pain or shortness of breath.  She will be admitted for further evaluation and treatment to include stress myocardial review today    Review of Systems   Constitutional: Negative for activity change, appetite change and fatigue.   HENT: Negative.    Respiratory: Negative for cough, choking, chest tightness and shortness of breath.    Cardiovascular: Negative for chest pain and leg swelling.   Gastrointestinal: Negative.    Genitourinary: Negative.    Musculoskeletal: Negative.    Neurological: Negative.    Psychiatric/Behavioral: Negative.           History  Past Medical History:   Diagnosis Date   • Allergic    • Arthritis    • Asthma    • Breast cancer (HCC)    • Cataract    • Chronic diarrhea    • Headache    • Hx of radiation therapy    • Injury of back    • Osteoporosis      Past Surgical History:   Procedure Laterality Date   • BREAST BIOPSY     • BREAST LUMPECTOMY     • HYSTERECTOMY       Family History   Problem Relation Age of Onset   • Breast cancer Maternal Grandmother    • Ovarian cancer Mother      Social History     Tobacco Use   • Smoking status: Former Smoker     Years: 2.00   • Smokeless tobacco: Never Used   Substance Use Topics   • Alcohol use: Yes     Comment: rarely   • Drug use: Never      Medications Prior to Admission   Medication Sig Dispense Refill Last Dose   • letrozole (FEMARA) 2.5 MG tablet TAKE 1 TABLET BY MOUTH EVERY DAY 90 tablet 2 8/15/2022 at 0800   • lisinopril-hydrochlorothiazide (PRINZIDE,ZESTORETIC) 10-12.5 MG per tablet Take 1 tablet by mouth Daily.   8/15/2022 at 0800   • alendronate (FOSAMAX) 70 MG tablet TAKE 1 TABLET BY MOUTH EVERY 7 (SEVEN) DAYS. TAKE WITH A FULL GLASS OF WATER ON AN EMPTY STOMACH IN THE MORNING, STAY IN AN UPRIGHT POSITION FOR AT LEAST ONE HOUR AFTER TAKING THIS MEDICATION. 4 tablet 11    • Calcium Carb-Cholecalciferol (Os-Arthur Calcium + D3) 500-200 MG-UNIT tablet Take  by mouth.      • cyclobenzaprine (FLEXERIL) 10 MG tablet TAKE ONE TABLET 2 TIMES A DAY AS NEEDED      • denosumab (Prolia) 60 MG/ML solution prefilled syringe syringe       • dicyclomine (BENTYL) 20 MG tablet Take 20 mg by mouth 2 (Two) Times a Day.      • diphenhydrAMINE-acetaminophen (TYLENOL PM)  MG tablet per tablet Take  by mouth.      • EC-Naproxen 500 MG EC tablet Take 500 mg by mouth 2 (Two) Times a Day With Meals.      • ferrous sulfate 324 (65 Fe) MG tablet delayed-release EC tablet Take 324 mg by mouth Daily With Breakfast.      • furosemide (LASIX) 40 MG tablet Take 40 mg by mouth Daily.      • K-Phos 500 MG tablet Take 500 mg by mouth 2 (Two) Times a Day.      • levothyroxine (SYNTHROID, LEVOTHROID) 25 MCG tablet       • lisinopril (PRINIVIL,ZESTRIL) 10 MG tablet Take  by mouth.      • methylPREDNISolone (MEDROL) 4 MG dose pack TAKE 6 TABLETS ON DAY 1 AS DIRECTED ON PACKAGE AND DECREASE BY 1 TAB EACH DAY FOR A TOTAL OF 6 DAYS      • multivitamin (THERAGRAN) tablet tablet       • naproxen (NAPROSYN) 500 MG tablet Take 500 mg by mouth 2 (Two) Times a Day With Meals.      • potassium chloride ER (K-TAB) 20 MEQ tablet controlled-release ER tablet Take 20 mEq by mouth Daily. with food      • Specialty Vitamins Products (Menopause Relief) tablet MENOPAUSE RELIEF ORAL TABLET       • SUMAtriptan (IMITREX) 50 MG tablet PLEASE SEE ATTACHED FOR DETAILED DIRECTIONS      • Viberzi 75 MG tablet Take 1 tablet by mouth 2 (Two) Times a Day.        Allergies:  Penicillins and Letrozole    Objective     Vital Signs  Temp:  [97.8 °F (36.6 °C)-98.7 °F (37.1 °C)] 98.2 °F (36.8 °C)  Heart Rate:  [72-88] 72  Resp:  [15-18] 15  BP: (113-128)/(75-91) 113/77       Physical Exam  Vitals reviewed.   Constitutional:       Appearance: She is not ill-appearing.   HENT:      Head: Normocephalic and atraumatic.      Right Ear: External ear normal.      Left Ear: External ear normal.      Nose: Nose normal.      Mouth/Throat:      Mouth: Mucous membranes are moist.   Eyes:      General:         Right eye: No discharge.         Left eye: No discharge.   Cardiovascular:      Rate and Rhythm: Normal rate and regular rhythm.      Pulses: Normal pulses.      Heart sounds: Normal heart sounds.   Pulmonary:      Effort: Pulmonary effort is normal.      Breath sounds: Normal breath sounds.   Abdominal:      General: Bowel sounds are normal.      Palpations: Abdomen is soft.   Musculoskeletal:         General: No swelling. Normal range of motion.      Cervical back: Normal range of motion.   Skin:     General: Skin is warm and dry.   Neurological:      Mental Status: She is alert and oriented to person, place, and time.   Psychiatric:         Behavior: Behavior normal.          Results Review:     Imaging Results (Last 24 Hours)     Procedure Component Value Units Date/Time    XR Chest 1 View [[id number]] Collected: 08/15/22 2128     Updated: 08/15/22 2131    Narrative:      DATE OF EXAM:  8/15/2022 9:19 PM     PROCEDURE:  XR CHEST 1 VW-     INDICATIONS:  Chest pain arm pain     COMPARISON:  Chest radiograph 08/01/2018     TECHNIQUE:   Single radiographic view of the chest was obtained.     FINDINGS:  Lungs are normally expanded. Heart size is normal. No pneumothorax or  pleural effusion or focal pulmonary parenchymal opacity.  Pulmonary  vessels are distinct. Bones and soft tissues are normal.  There are  calcified hilar mediastinal lymph nodes and scattered calcified  granulomas in the lungs.       Impression:      No acute cardiopulmonary abnormality.     Electronically Signed By-Sarah Corona MD On:8/15/2022 9:29 PM  This report was finalized on [id number] by  Sarah Corona MD.           Lab Results (last 24 hours)     Procedure Component Value Units Date/Time    COVID PRE-OP / PRE-PROCEDURE SCREENING ORDER (NO ISOLATION) - Swab, Nasopharynx [[id number]] Collected: 08/16/22 0950    Specimen: Swab from Nasopharynx Updated: 08/16/22 1000    Narrative:      The following orders were created for panel order COVID PRE-OP / PRE-PROCEDURE SCREENING ORDER (NO ISOLATION) - Swab, Nasopharynx.  Procedure                               Abnormality         Status                     ---------                               -----------         ------                     COVID-19,CEPHEID/HOLLIE,CO...[[id number]]                      In process                   Please view results for these tests on the individual orders.    COVID-19,CEPHEID/HOLLIE,COR/MARCUS/PAD/RAFFAELE IN-HOUSE(OR EMERGENT/ADD-ON),NP SWAB IN TRANSPORT MEDIA 3-4 HR TAT, RT-PCR - Swab, Nasopharynx [[id number]] Collected: 08/16/22 0950    Specimen: Swab from Nasopharynx Updated: 08/16/22 1000    Troponin [[id number]]  (Normal) Collected: 08/16/22 0126    Specimen: Blood Updated: 08/16/22 0157     Troponin T <0.010 ng/mL     Narrative:      Troponin T Reference Range:  <= 0.03 ng/mL-   Negative for AMI  >0.03 ng/mL-     Abnormal for myocardial necrosis.  Clinicians would have to utilize clinical acumen, EKG, Troponin and serial changes to determine if it is an Acute Myocardial Infarction or myocardial injury due to an underlying chronic condition.       Results may be falsely decreased if patient taking Biotin.      [place] Draw [[id number]] Collected: 08/15/22 2036    Specimen: Blood Updated: 08/15/22  2147    Narrative:      The following orders were created for panel order [place] Draw.  Procedure                               Abnormality         Status                     ---------                               -----------         ------                     Green Top (Gel)[[id number]]                                  Final result               Lavender Top[[id number]]                                     Final result               Light Blue Top[[id number]]                                   Final result                 Please view results for these tests on the individual orders.    Green Top (Gel) [[id number]] Collected: 08/15/22 2036    Specimen: Blood Updated: 08/15/22 2147     Extra Tube Hold for add-ons.     Comment: Auto resulted.       Light Blue Top [[id number]] Collected: 08/15/22 2036    Specimen: Blood Updated: 08/15/22 2147     Extra Tube Hold for add-ons.     Comment: Auto resulted       Lavender Top [[id number]] Collected: 08/15/22 2036    Specimen: Blood Updated: 08/15/22 2147     Extra Tube hold for add-on     Comment: Auto resulted       Troponin [[id number]]  (Normal) Collected: 08/15/22 2036    Specimen: Blood Updated: 08/15/22 2133     Troponin T <0.010 ng/mL     Narrative:      Troponin T Reference Range:  <= 0.03 ng/mL-   Negative for AMI  >0.03 ng/mL-     Abnormal for myocardial necrosis.  Clinicians would have to utilize clinical acumen, EKG, Troponin and serial changes to determine if it is an Acute Myocardial Infarction or myocardial injury due to an underlying chronic condition.       Results may be falsely decreased if patient taking Biotin.      TSH [[id number]]  (Normal) Collected: 08/15/22 2036    Specimen: Blood Updated: 08/15/22 2133     TSH 1.270 uIU/mL     Basic Metabolic Panel [[id number]]  (Abnormal) Collected: 08/15/22 2036    Specimen: Blood Updated: 08/15/22 2128     Glucose 100 mg/dL      BUN 18 mg/dL      Creatinine 0.88 mg/dL      Sodium 142 mmol/L      Potassium 3.6 mmol/L       Chloride 103 mmol/L      CO2 28.0 mmol/L      Calcium 9.8 mg/dL      BUN/Creatinine Ratio 20.5     Anion Gap 11.0 mmol/L      eGFR 77.2 mL/min/1.73      Comment: National Kidney Foundation and American Society of Nephrology (ASN) Task Force recommended calculation based on the Chronic Kidney Disease Epidemiology Collaboration (CKD-EPI) equation refit without adjustment for race.       Narrative:      GFR Normal >60  Chronic Kidney Disease <60  Kidney Failure <15      Magnesium [[id number]]  (Normal) Collected: 08/15/22 2036    Specimen: Blood Updated: 08/15/22 2128     Magnesium 1.9 mg/dL     CBC & Differential [[id number]]  (Normal) Collected: 08/15/22 2036    Specimen: Blood Updated: 08/15/22 2111    Narrative:      The following orders were created for panel order CBC & Differential.  Procedure                               Abnormality         Status                     ---------                               -----------         ------                     CBC Auto Differential[[id number]]        Normal              Final result                 Please view results for these tests on the individual orders.    CBC Auto Differential [[id number]]  (Normal) Collected: 08/15/22 2036    Specimen: Blood Updated: 08/15/22 2111     WBC 6.60 10*3/mm3      RBC 4.43 10*6/mm3      Hemoglobin 13.5 g/dL      Hematocrit 40.5 %      MCV 91.5 fL      MCH 30.5 pg      MCHC 33.4 g/dL      RDW 13.0 %      RDW-SD 42.0 fl      MPV 8.1 fL      Platelets 216 10*3/mm3      Neutrophil % 65.7 %      Lymphocyte % 25.4 %      Monocyte % 6.6 %      Eosinophil % 1.8 %      Basophil % 0.5 %      Neutrophils, Absolute 4.40 10*3/mm3      Lymphocytes, Absolute 1.70 10*3/mm3      Monocytes, Absolute 0.40 10*3/mm3      Eosinophils, Absolute 0.10 10*3/mm3      Basophils, Absolute 0.00 10*3/mm3      nRBC 0.1 /100 WBC            I reviewed the patient's new clinical results.    Assessment & Plan       Left arm pain  -troponin negative  -EKG without st  changes  -echo 8/2018 EF 65%  -stress test 2018 negative  -stress test today    Hypertension-lisinopril      Diet:npo  DVT prophylaxis:lovenox  Code status:full      I discussed the patient's findings and my recommendations with patient.     CALEB Varela  08/16/22  10:06 EDT

## 2022-08-17 NOTE — DISCHARGE SUMMARY
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          Hospital Course  Patient is a 56 y.o. female presented with let arm heaviness and discomfort.  It was felt to be an anginal equivalent so she was admitted.  Stress test, EKG, telemetry, troponin were normal. She had no recurrence of symptoms and had a normal exam with stable vital signs.      Procedures Performed         Consults:   Consults     No orders found from 7/17/2022 to 8/16/2022.          Pertinent Test Results:    Lab Results (most recent)     Procedure Component Value Units Date/Time    COVID PRE-OP / PRE-PROCEDURE SCREENING ORDER (NO ISOLATION) - Swab, Nasopharynx [[id number]]  (Normal) Collected: 08/16/22 0950    Specimen: Swab from Nasopharynx Updated: 08/16/22 1044    Narrative:      The following orders were created for panel order COVID PRE-OP / PRE-PROCEDURE SCREENING ORDER (NO ISOLATION) - Swab, Nasopharynx.  Procedure                               Abnormality         Status                     ---------                               -----------         ------                     COVID-19,CEPHEID/HOLLIE,CO...[[id number]]  Normal              Final result                 Please view results for these tests on the individual orders.    COVID-19,CEPHEID/HOLLIE,COR/[facility]/PAD/[facility] IN-HOUSE(OR EMERGENT/ADD-ON),NP SWAB IN TRANSPORT MEDIA 3-4 HR TAT, RT-PCR - Swab, Nasopharynx [[id number]]  (Normal) Collected: 08/16/22 0950    Specimen: Swab from Nasopharynx Updated: 08/16/22 1044     COVID19 Not Detected    Narrative:      Fact sheet for providers: https://www.fda.gov/media/094634/download     Fact  sheet for patients: https://www.fda.gov/media/067771/download  Fact sheet for providers: https://www.fda.gov/media/036819/download     Fact sheet for patients: https://www.fda.gov/media/517523/download    Troponin [[id number]]  (Normal) Collected: 08/16/22 0126    Specimen: Blood Updated: 08/16/22 0157     Troponin T <0.010 ng/mL     Narrative:      Troponin T Reference Range:  <= 0.03 ng/mL-   Negative for AMI  >0.03 ng/mL-     Abnormal for myocardial necrosis.  Clinicians would have to utilize clinical acumen, EKG, Troponin and serial changes to determine if it is an Acute Myocardial Infarction or myocardial injury due to an underlying chronic condition.       Results may be falsely decreased if patient taking Biotin.      [facility] Draw [[id number]] Collected: 08/15/22 2036    Specimen: Blood Updated: 08/15/22 2147    Narrative:      The following orders were created for panel order [facility] Draw.  Procedure                               Abnormality         Status                     ---------                               -----------         ------                     Green Top (Gel)[[id number]]                                  Final result               Lavender Top[[id number]]                                     Final result               Light Blue Top[[id number]]                                   Final result                 Please view results for these tests on the individual orders.    Green Top (Gel) [[id number]] Collected: 08/15/22 2036    Specimen: Blood Updated: 08/15/22 2147     Extra Tube Hold for add-ons.     Comment: Auto resulted.       Light Blue Top [[id number]] Collected: 08/15/22 2036    Specimen: Blood Updated: 08/15/22 2147     Extra Tube Hold for add-ons.     Comment: Auto resulted       Lavender Top [[id number]] Collected: 08/15/22 2036    Specimen: Blood Updated: 08/15/22 2147     Extra Tube hold for add-on     Comment: Auto resulted       Troponin [[id number]]  (Normal) Collected: 08/15/22 2036     Specimen: Blood Updated: 08/15/22 2133     Troponin T <0.010 ng/mL     Narrative:      Troponin T Reference Range:  <= 0.03 ng/mL-   Negative for AMI  >0.03 ng/mL-     Abnormal for myocardial necrosis.  Clinicians would have to utilize clinical acumen, EKG, Troponin and serial changes to determine if it is an Acute Myocardial Infarction or myocardial injury due to an underlying chronic condition.       Results may be falsely decreased if patient taking Biotin.      TSH [[id number]]  (Normal) Collected: 08/15/22 2036    Specimen: Blood Updated: 08/15/22 2133     TSH 1.270 uIU/mL     Basic Metabolic Panel [[id number]]  (Abnormal) Collected: 08/15/22 2036    Specimen: Blood Updated: 08/15/22 2128     Glucose 100 mg/dL      BUN 18 mg/dL      Creatinine 0.88 mg/dL      Sodium 142 mmol/L      Potassium 3.6 mmol/L      Chloride 103 mmol/L      CO2 28.0 mmol/L      Calcium 9.8 mg/dL      BUN/Creatinine Ratio 20.5     Anion Gap 11.0 mmol/L      eGFR 77.2 mL/min/1.73      Comment: National Kidney Foundation and American Society of Nephrology (ASN) Task Force recommended calculation based on the Chronic Kidney Disease Epidemiology Collaboration (CKD-EPI) equation refit without adjustment for race.       Narrative:      GFR Normal >60  Chronic Kidney Disease <60  Kidney Failure <15      Magnesium [[id number]]  (Normal) Collected: 08/15/22 2036    Specimen: Blood Updated: 08/15/22 2128     Magnesium 1.9 mg/dL     CBC & Differential [[id number]]  (Normal) Collected: 08/15/22 2036    Specimen: Blood Updated: 08/15/22 2111    Narrative:      The following orders were created for panel order CBC & Differential.  Procedure                               Abnormality         Status                     ---------                               -----------         ------                     CBC Auto Differential[[id number]]        Normal              Final result                 Please view results for these tests on the individual orders.     CBC Auto Differential [[id number]]  (Normal) Collected: 08/15/22 2036    Specimen: Blood Updated: 08/15/22 2111     WBC 6.60 10*3/mm3      RBC 4.43 10*6/mm3      Hemoglobin 13.5 g/dL      Hematocrit 40.5 %      MCV 91.5 fL      MCH 30.5 pg      MCHC 33.4 g/dL      RDW 13.0 %      RDW-SD 42.0 fl      MPV 8.1 fL      Platelets 216 10*3/mm3      Neutrophil % 65.7 %      Lymphocyte % 25.4 %      Monocyte % 6.6 %      Eosinophil % 1.8 %      Basophil % 0.5 %      Neutrophils, Absolute 4.40 10*3/mm3      Lymphocytes, Absolute 1.70 10*3/mm3      Monocytes, Absolute 0.40 10*3/mm3      Eosinophils, Absolute 0.10 10*3/mm3      Basophils, Absolute 0.00 10*3/mm3      nRBC 0.1 /100 WBC                   Condition on Discharge:  Stable    Vital Signs       Physical Exam:     General Appearance:    Alert, cooperative, in no acute distress   Head:    Normocephalic, without obvious abnormality, atraumatic   Eyes:            Lids and lashes normal, conjunctivae and sclerae normal, no   icterus, no pallor, corneas clear, PERRLA   Ears:    Ears appear intact with no abnormalities noted   Throat:   No oral lesions, no thrush, oral mucosa moist   Neck:   No adenopathy, supple, trachea midline, no thyromegaly, no   carotid bruit, no JVD   Lungs:     Clear to auscultation,respirations regular, even and                  unlabored    Heart:    Regular rhythm and normal rate, normal S1 and S2, no            murmur, no gallop, no rub, no click   Chest Wall:    No abnormalities observed   Abdomen:     Normal bowel sounds, no masses, no organomegaly, soft        non-tender, non-distended, no guarding, no rebound                tenderness   Extremities:   Moves all extremities well, no edema, no cyanosis, no             redness   Pulses:   Pulses palpable and equal bilaterally   Skin:   No bleeding, bruising or rash   Lymph nodes:   No palpable adenopathy   Neurologic:   Cranial nerves 2 - 12 grossly intact, sensation intact, DTR       present and  equal bilaterally       Discharge Disposition  Home or Self Care    Discharge Medications     Discharge Medications      Continue These Medications      Instructions Start Date   alendronate 70 MG tablet  Commonly known as: FOSAMAX   70 mg, Oral, Every 7 Days, Take  with a full glass of water on an empty stomach in the morning, stay  in an upright position for at least one hour after taking this medication.      cyclobenzaprine 10 MG tablet  Commonly known as: FLEXERIL   TAKE ONE TABLET 2 TIMES A DAY AS NEEDED      diphenhydrAMINE-acetaminophen  MG tablet per tablet  Commonly known as: TYLENOL PM   Oral      ferrous sulfate 324 (65 Fe) MG tablet delayed-release EC tablet   324 mg, Oral, Daily With Breakfast      K-Phos 500 MG tablet  Generic drug: potassium phosphate (monobasic)   500 mg, Oral, 2 Times Daily      letrozole 2.5 MG tablet  Commonly known as: FEMARA   TAKE 1 TABLET BY MOUTH EVERY DAY      levothyroxine 25 MCG tablet  Commonly known as: SYNTHROID, LEVOTHROID   No dose, route, or frequency recorded.      lisinopril-hydrochlorothiazide 10-12.5 MG per tablet  Commonly known as: PRINZIDE,ZESTORETIC   1 tablet, Oral, Daily      Os-Arthur Calcium + D3 500-200 MG-UNIT tablet  Generic drug: Calcium Carb-Cholecalciferol   Oral      Prolia 60 MG/ML solution prefilled syringe syringe  Generic drug: denosumab   No dose, route, or frequency recorded.         Stop These Medications    dicyclomine 20 MG tablet  Commonly known as: BENTYL     EC-Naproxen 500 MG EC tablet  Generic drug: naproxen     furosemide 40 MG tablet  Commonly known as: LASIX     lisinopril 10 MG tablet  Commonly known as: IVILZESTRIL     Menopause Relief tablet     methylPREDNISolone 4 MG dose pack  Commonly known as: MEDROL     multivitamin tablet tablet     naproxen 500 MG tablet  Commonly known as: NAPROSYN     potassium chloride ER 20 MEQ tablet controlled-release ER tablet  Commonly known as: K-TAB     SUMAtriptan 50 MG  tablet  Commonly known as: IMITREX     Viberzi 75 MG tablet  Generic drug: Eluxadoline            Discharge Diet:   Diet Instructions    regular           Activity at Discharge:   Activity Instructions     Other Activity Instructions      Activity Instructions: May return to work on 8/18 without restrictions.          Follow-up Appointments  Future Appointments   Date Time Provider Department Center   10/5/2022  2:30 PM [facility] 2 [facility]   10/5/2022  3:00 PM [facility] US 3 [facility] US [facility]   10/6/2022  1:00 PM [facility] DEXA 1 [facility] DEXA [facility]   10/19/2022  1:15 PM Juliette Harris MD [facility]   10/19/2022  1:15 PM LAB MD [facility] ON[facility] LAB NA [facility] O[facility]   12/8/2022  3:00 PM INF ROOM 33 - CHAIR A  [facility]  [facility] CC [facility]     Additional Instructions for the Follow-ups that You Need to Schedule     Discharge Follow-up with PCP   As directed       Currently Documented PCP:    Germán Spencer FNP    PCP Phone Number:    [phone]     Follow Up Details: Keep your regularly scheduled appointment               Test Results Pending at Discharge       Randa Richardson MD  08/17/22  16:01 EDT    Time: Discharge 25 min

## 2022-08-17 NOTE — CASE MANAGEMENT/SOCIAL WORK
Case Management Discharge Note      Final Note: Home         Selected Continued Care - Discharged on 8/16/2022 Admission date: 8/15/2022 - Discharge disposition: Home or Self Care   Transportation Services  Private: Car    Final Discharge Disposition Code: 01 - home or self-care

## 2022-08-29 NOTE — TELEPHONE ENCOUNTER
LILY, CAN YOU CALL PATIENT TOMORROW BETWEEN 1-2 AS HER WORK SCHEDULE HAS CHANGED & THAT IS THE BEST TIME TO CALL HER TO RESCHEDULE?  PHONE NUMBER [phone], TRIED TO WT BUT NO ANSWER.

## 2022-09-16 ENCOUNTER — TELEPHONE (OUTPATIENT)
Dept: ONCOLOGY | Facility: CLINIC | Age: 57
End: 2022-09-16

## 2022-09-16 NOTE — TELEPHONE ENCOUNTER
Caller: Angy Davies    Relationship to patient: Self    Best call back number: [phone]    Chief complaint: R/S    Type of visit: LAB AND FOLLOW UP    Requested date: 10-19    If rescheduling, when is the original appointment: 10-21, 10-28    Additional notes:PLEASE ADVISE

## 2022-10-05 ENCOUNTER — APPOINTMENT (OUTPATIENT)
Dept: ULTRASOUND IMAGING | Facility: HOSPITAL | Age: 57
End: 2022-10-05

## 2022-10-05 ENCOUNTER — APPOINTMENT (OUTPATIENT)
Dept: MAMMOGRAPHY | Facility: HOSPITAL | Age: 57
End: 2022-10-05

## 2022-10-06 ENCOUNTER — HOSPITAL ENCOUNTER (OUTPATIENT)
Dept: BONE DENSITY | Facility: HOSPITAL | Age: 57
End: 2022-10-06

## 2022-10-07 ENCOUNTER — HOSPITAL ENCOUNTER (OUTPATIENT)
Dept: MAMMOGRAPHY | Facility: HOSPITAL | Age: 57
Discharge: HOME | End: 2022-10-07

## 2022-10-07 ENCOUNTER — APPOINTMENT (OUTPATIENT)
Dept: BONE DENSITY | Facility: HOSPITAL | Age: 57
End: 2022-10-07

## 2022-10-07 ENCOUNTER — HOSPITAL ENCOUNTER (OUTPATIENT)
Dept: BONE DENSITY | Facility: HOSPITAL | Age: 57
Discharge: HOME | End: 2022-10-07

## 2022-10-07 ENCOUNTER — HOSPITAL ENCOUNTER (OUTPATIENT)
Dept: ULTRASOUND IMAGING | Facility: HOSPITAL | Age: 57
Discharge: HOME | End: 2022-10-07

## 2022-10-07 DIAGNOSIS — C50.919 MALIGNANT NEOPLASM OF FEMALE BREAST, UNSPECIFIED ESTROGEN RECEPTOR STATUS, UNSPECIFIED LATERALITY, UNSPECIFIED SITE OF BREAST: ICD-10-CM

## 2022-10-07 DIAGNOSIS — Z78.0 POSTMENOPAUSAL: ICD-10-CM

## 2022-10-07 PROCEDURE — 77080 DXA BONE DENSITY AXIAL: CPT

## 2022-10-07 PROCEDURE — G0279 TOMOSYNTHESIS, MAMMO: HCPCS

## 2022-10-07 PROCEDURE — 77066 DX MAMMO INCL CAD BI: CPT

## 2022-10-20 NOTE — PROGRESS NOTES
Hematology/Oncology Outpatient Follow Up    PATIENT NAME:Angy Davies  :1965  MRN: [id number]  PRIMARY CARE PHYSICIAN: Germán Spencer FNP  REFERRING PHYSICIAN: Germán Spencer FNP    Chief Complaint   Patient presents with   • Follow-up     Malignant neoplasm of female breast, unspecified estrogen receptor status, unspecified laterality, unspecified site of breast (HCC)        HISTORY OF PRESENT ILLNESS:     This a 55-year-old female who in 2018 was found to have stage I invasive ductal carcinoma of the right breast.  There underwent right lumpectomy followed by sentinel lymph node biopsy at [facility] on 2018.  Pathology showed grade 1 tubular carcinoma measuring 6.5 mm associated with some DCIS.  Margins were negative.  And lymph nodes were also negative pathologic stage was pT1b N0 M0.  Patient was then placed on adjuvant tamoxifen following radiation treatment.  She has been under the care of Dr. House.  Patient still that she developed progressive memory issues while on tamoxifen and had requested to have her treatments switched.  Review of Dr. House's note suggest that the patient did have hormonal levels for estradiol and FSH FSH was in the postmenopausal range but estradiol was still within perimenopausal range.  She was asked to continue tamoxifen with repeat hormonal levels 6 to 12 months.  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  She states she does not want to lose her job as she is fearful that tamoxifen effects will continue to be a problem.  She has been on tamoxifen since .  Her last mammogram was 2019.  She denies any breast lumps, nipple discharge or skin discoloration.  2/15/2019 she had a DEXA scan which showed osteopenia and patient is currently on calcium with vitamin D.  She also had intermittent abnormalities in her thyroid function testing.  Had a B12 level which was normal at  693    · 9/17/2020: Patient discontinued tamoxifen due to memory issues.  · 9/17/2020 patient had a chemistry panel which was actually unremarkable.  Estradiol was less than 5 which is in the postmenopausal range and FSH was 53 which is also in the post menopausal range white count was 7, hemoglobin 12.2 and platelets are 221.  Differentials where 69% neutrophils, 32% lymphocytes, there was no monocytosis eosinophilia or basophilia  · 9/29/2020 she had bilateral diagnostic mammogram which showed dense breast tissue.  But no evidence of malignancy was seen.  Follow-up in 1 year was recommended.  · 9/29/2020 patient had bone density which showed osteopenia  · Patient discontinued Aromasin due to diarrhea  · 4/20/2021: Patient was placed on Femara 2.5 mg p.o. daily  · September 2021 she had bilateral diagnostic mammogram which was negative  · 5/3/2022: Bone scan showing no malignant or metastatic disease  · 10/7/2022: Bilateral diagnostic mammogram negative for malignancy in either breast  · 10/7/2022: DEXA scan showing osteopenia  Past Medical History:   Diagnosis Date   • Allergic    • Arthritis    • Asthma    • Breast cancer (HCC)    • Cataract    • Chronic diarrhea    • Headache    • Hx of radiation therapy    • Injury of back    • Osteoporosis        Past Surgical History:   Procedure Laterality Date   • BREAST BIOPSY     • BREAST LUMPECTOMY     • HYSTERECTOMY           Current Outpatient Medications:   •  alendronate (FOSAMAX) 70 MG tablet, TAKE 1 TABLET BY MOUTH EVERY 7 (SEVEN) DAYS. TAKE WITH A FULL GLASS OF WATER ON AN EMPTY STOMACH IN THE MORNING, STAY IN AN UPRIGHT POSITION FOR AT LEAST ONE HOUR AFTER TAKING THIS MEDICATION., Disp: 4 tablet, Rfl: 11  •  Calcium Carb-Cholecalciferol (Os-Arthur Calcium + D3) 500-200 MG-UNIT tablet, Take  by mouth., Disp: , Rfl:   •  cyclobenzaprine (FLEXERIL) 10 MG tablet, TAKE ONE TABLET 2 TIMES A DAY AS NEEDED, Disp: , Rfl:   •  denosumab (Prolia) 60 MG/ML solution prefilled  syringe syringe, , Disp: , Rfl:   •  dicyclomine (BENTYL) 20 MG tablet, TAKE 1 TABLET BY ORAL ROUTE 2-3 TIMES EVERY DAY, Disp: , Rfl:   •  diphenhydrAMINE-acetaminophen (TYLENOL PM)  MG tablet per tablet, Take  by mouth., Disp: , Rfl:   •  ferrous sulfate 324 (65 Fe) MG tablet delayed-release EC tablet, Take 324 mg by mouth Daily With Breakfast., Disp: , Rfl:   •  K-Phos 500 MG tablet, Take 500 mg by mouth 2 (Two) Times a Day., Disp: , Rfl:   •  letrozole (FEMARA) 2.5 MG tablet, TAKE 1 TABLET BY MOUTH EVERY DAY, Disp: 90 tablet, Rfl: 2  •  levothyroxine (SYNTHROID, LEVOTHROID) 25 MCG tablet, , Disp: , Rfl:   •  lisinopril-hydrochlorothiazide (PRINZIDE,ZESTORETIC) 10-12.5 MG per tablet, Take 1 tablet by mouth Daily., Disp: , Rfl:   •  naproxen (NAPROSYN) 500 MG tablet, Take 1 tablet by mouth 2 (Two) Times a Day With Meals., Disp: , Rfl:     Allergies   Allergen Reactions   • Aspirin Nausea And Vomiting   • Penicillins GI Intolerance   • Letrozole Rash       Family History   Problem Relation Age of Onset   • Breast cancer Maternal Grandmother    • Ovarian cancer Mother        Cancer-related family history includes Breast cancer in her maternal grandmother; Ovarian cancer in her mother.    Social History     Tobacco Use   • Smoking status: Former     Years: 2.00     Types: Cigarettes   • Smokeless tobacco: Never   Substance Use Topics   • Alcohol use: Yes     Comment: rarely   • Drug use: Never       HPI, ROS and PFSH have been reviewed and confirmed on 10/21/2022.     SUBJECTIVE:  Patient is here today for routine follow-up.  She has no new complaints.  She does complain of joint pain.  She takes naproxen and uses topical analgesics to treat this.  She reports compliance with monthly self breast exam and has no new worrisome findings.            REVIEW OF SYSTEMS:    Review of Systems   Constitutional: Negative for chills and fever.   HENT: Negative for ear pain, mouth sores, nosebleeds and sore throat.    Eyes:  "Negative for photophobia and visual disturbance.   Respiratory: Negative for wheezing and stridor.    Cardiovascular: Negative for chest pain and palpitations.   Gastrointestinal: Negative for abdominal pain, diarrhea, nausea and vomiting.   Endocrine: Negative for cold intolerance and heat intolerance.   Genitourinary: Negative for dysuria and hematuria.   Musculoskeletal: Positive for arthralgias. Negative for joint swelling and neck stiffness.   Skin: Negative for color change and rash.   Neurological: Negative for seizures and syncope.   Hematological: Negative for adenopathy.        No obvious bleeding   Psychiatric/Behavioral: Negative for agitation, confusion and hallucinations.     I have reviewed and confirmed the accuracy of the ROS as documented by the MA/LPN/RN Taylor Mishra, CALEB      OBJECTIVE:    Vitals:    10/21/22 1151   Weight: 68 kg (150 lb)  Comment: verbal   Height: 165.1 cm (65\")   PainSc: 0-No pain     Body mass index is 24.96 kg/m².    ECOG    (0) Fully active, able to carry on all predisease performance without restriction    Physical Exam  Vitals and nursing note reviewed.   Constitutional:       General: She is not in acute distress.     Appearance: She is not diaphoretic.   HENT:      Head: Normocephalic and atraumatic.   Eyes:      General: No scleral icterus.        Right eye: No discharge.         Left eye: No discharge.      Conjunctiva/sclera: Conjunctivae normal.   Neck:      Thyroid: No thyromegaly.   Cardiovascular:      Rate and Rhythm: Normal rate and regular rhythm.      Heart sounds: Normal heart sounds.     No friction rub. No gallop.   Pulmonary:      Effort: Pulmonary effort is normal. No respiratory distress.      Breath sounds: No stridor. No wheezing.   Chest:      Chest wall: No mass.   Breasts:     Breasts are symmetrical.      Right: No swelling, mass, nipple discharge, skin change or tenderness.      Left: Normal. No swelling, mass, nipple discharge, skin " change or tenderness.      Comments: Scar on the right breast in the upper outer quadrant from lumpectomy  Abdominal:      General: Bowel sounds are normal.      Palpations: Abdomen is soft. There is no mass.      Tenderness: There is no abdominal tenderness. There is no guarding or rebound.   Musculoskeletal:         General: No tenderness. Normal range of motion.      Cervical back: Normal range of motion and neck supple.   Lymphadenopathy:      Cervical: No cervical adenopathy.   Skin:     General: Skin is warm.      Findings: No erythema or rash.   Neurological:      Mental Status: She is alert and oriented to person, place, and time.      Motor: No abnormal muscle tone.   Psychiatric:         Behavior: Behavior normal.       I have reexamined the patient and the results are consistent with the previously documented exam. Taylor Mishra, APRN       RECENT LABS    WBC   Date Value Ref Range Status   10/21/2022 4.55 3.40 - 10.80 10*3/mm3 Final     RBC   Date Value Ref Range Status   10/21/2022 3.91 3.77 - 5.28 10*6/mm3 Final     Hemoglobin   Date Value Ref Range Status   10/21/2022 12.3 12.0 - 15.9 g/dL Final     Hematocrit   Date Value Ref Range Status   10/21/2022 37.3 34.0 - 46.6 % Final     MCV   Date Value Ref Range Status   10/21/2022 95.4 79.0 - 97.0 fL Final     MCH   Date Value Ref Range Status   10/21/2022 31.5 26.6 - 33.0 pg Final     MCHC   Date Value Ref Range Status   10/21/2022 33.0 31.5 - 35.7 g/dL Final     RDW   Date Value Ref Range Status   10/21/2022 12.4 12.3 - 15.4 % Final     RDW-SD   Date Value Ref Range Status   10/21/2022 42.0 37.0 - 54.0 fl Final     MPV   Date Value Ref Range Status   10/21/2022 9.8 6.0 - 12.0 fL Final     Platelets   Date Value Ref Range Status   10/21/2022 162 140 - 450 10*3/mm3 Final     Neutrophil %   Date Value Ref Range Status   10/21/2022 47.0 42.7 - 76.0 % Final     Lymphocyte %   Date Value Ref Range Status   10/21/2022 37.8 19.6 - 45.3 % Final      Monocyte %   Date Value Ref Range Status   10/21/2022 10.8 5.0 - 12.0 % Final     Eosinophil %   Date Value Ref Range Status   10/21/2022 4.0 0.3 - 6.2 % Final     Basophil %   Date Value Ref Range Status   10/21/2022 0.4 0.0 - 1.5 % Final     Neutrophils, Absolute   Date Value Ref Range Status   10/21/2022 2.14 1.70 - 7.00 10*3/mm3 Final     Lymphocytes, Absolute   Date Value Ref Range Status   10/21/2022 1.72 0.70 - 3.10 10*3/mm3 Final     Monocytes, Absolute   Date Value Ref Range Status   10/21/2022 0.49 0.10 - 0.90 10*3/mm3 Final     Eosinophils, Absolute   Date Value Ref Range Status   10/21/2022 0.18 0.00 - 0.40 10*3/mm3 Final     Basophils, Absolute   Date Value Ref Range Status   10/21/2022 0.02 0.00 - 0.20 10*3/mm3 Final     nRBC   Date Value Ref Range Status   08/15/2022 0.1 0.0 - 0.2 /100 WBC Final       Lab Results   Component Value Date    GLUCOSE 100 (H) 08/15/2022    BUN 18 08/15/2022    CREATININE 0.88 08/15/2022    EGFRIFNONA 62 07/21/2021    BCR 20.5 08/15/2022    K 3.6 08/15/2022    CO2 28.0 08/15/2022    CALCIUM 9.8 08/15/2022    ALBUMIN 4.40 06/07/2022    LABIL2 1.4 11/28/2017    AST 18 06/07/2022    ALT 14 06/07/2022         Assessment & Plan     Malignant neoplasm of female breast, unspecified estrogen receptor status, unspecified laterality, unspecified site of breast (HCC)  - CBC & Differential  - Comprehensive Metabolic Panel      ASSESSMENT:    1. Invasive ductal carcinoma of the right breast status post right lumpectomy with sentinel lymph node biopsy ER positive, FL positive, HER-2/birdie negative... T1b N0 M0.  Status post radiation therapy.  2. Was on adjuvant endocrine therapy with tamoxifen discontinued due to memory issues  3. Could  not tolerate Aromasin due to diarrhea  4. On Femara 2.5 mg April 20, 2021.  So far she is tolerating Femara  5. Body aches and pains: We will further evaluate with bone scan.  This has been ordered.  Bone scan was negative for malignancy.  6. She will  continue calcium with vitamin D  7. 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 Patient is now postmenopausal  9. Osteopenia: Reviewed bone density: On Prolia having insurance issues.  Patient also states does not tolerate Fosamax .  Bone density is coming up in September 2022 we will reevaluate at that time  10. New anemia, labs reviewed.  Anemia has resolved        Plans     · CBC reviewed with patient and CMP ordered  · Continue Femara 2.5 mg p.o. daily  · Reviewed her mammogram from October 2022.  · Reviewed estradiol, serum FSH, CBC and CMP: Patient is now postmenopausal  · Continue Prolia every 6 months: Beginning to have insurance issues.  Review bone density September 2022 and make decisions.  May need referral to the orthopedic osteoporosis clinic.  Patient voices no insurance concerns at this appointment.  · Reviewed DEXA scan from October 2022, will be again due October 2024.  · She will continue continue Os-Arthur D twice a day  · Monthly breast self exams and call for lumps, nipple discharge, skin discoloration or breast pain  · Follow-up with Dr. Harris in 6 months or earlier as needed  · Note given for work adjustments due to arthritic symptoms in her finger joints at previous appointment  · All questions answered to the best of my abilities        Patient verbalized understanding and is in agreement of the above plan.            Thank you very much for allowing me to participate in the care of Sneha, I will keep you updated on her progress           I spent 30 total minutes, face-to-face, caring for Angy today.  90% of this time involved counseling and/or coordination of care as documented within this note.

## 2022-10-21 ENCOUNTER — OFFICE VISIT (OUTPATIENT)
Dept: ONCOLOGY | Facility: CLINIC | Age: 57
End: 2022-10-21

## 2022-10-21 ENCOUNTER — APPOINTMENT (OUTPATIENT)
Dept: LAB | Facility: HOSPITAL | Age: 57
End: 2022-10-21

## 2022-10-21 VITALS
HEIGHT: 65 IN | BODY MASS INDEX: 24.99 KG/M2 | TEMPERATURE: 96.8 F | SYSTOLIC BLOOD PRESSURE: 133 MMHG | OXYGEN SATURATION: 98 % | WEIGHT: 150 LBS | HEART RATE: 77 BPM | DIASTOLIC BLOOD PRESSURE: 86 MMHG

## 2022-10-21 DIAGNOSIS — C50.919 MALIGNANT NEOPLASM OF FEMALE BREAST, UNSPECIFIED ESTROGEN RECEPTOR STATUS, UNSPECIFIED LATERALITY, UNSPECIFIED SITE OF BREAST: Primary | ICD-10-CM

## 2022-10-21 LAB
ALBUMIN SERPL-MCNC: 4.3 G/DL (ref 3.5–5.2)
ALBUMIN/GLOB SERPL: 1.5 G/DL
ALP SERPL-CCNC: 53 U/L (ref 39–117)
ALT SERPL W P-5'-P-CCNC: 18 U/L (ref 1–33)
ANION GAP SERPL CALCULATED.3IONS-SCNC: 9 MMOL/L (ref 5–15)
AST SERPL-CCNC: 21 U/L (ref 1–32)
BASOPHILS # BLD AUTO: 0.02 10*3/MM3 (ref 0–0.2)
BASOPHILS NFR BLD AUTO: 0.4 % (ref 0–1.5)
BILIRUB SERPL-MCNC: 0.2 MG/DL (ref 0–1.2)
BUN SERPL-MCNC: 20 MG/DL (ref 6–20)
BUN/CREAT SERPL: 25.6 (ref 7–25)
CALCIUM SPEC-SCNC: 9.1 MG/DL (ref 8.6–10.5)
CHLORIDE SERPL-SCNC: 104 MMOL/L (ref 98–107)
CO2 SERPL-SCNC: 29 MMOL/L (ref 22–29)
CREAT SERPL-MCNC: 0.78 MG/DL (ref 0.57–1)
DEPRECATED RDW RBC AUTO: 42 FL (ref 37–54)
EGFRCR SERPLBLD CKD-EPI 2021: 88.7 ML/MIN/1.73
EOSINOPHIL # BLD AUTO: 0.18 10*3/MM3 (ref 0–0.4)
EOSINOPHIL NFR BLD AUTO: 4 % (ref 0.3–6.2)
ERYTHROCYTE [DISTWIDTH] IN BLOOD BY AUTOMATED COUNT: 12.4 % (ref 12.3–15.4)
GLOBULIN UR ELPH-MCNC: 2.9 GM/DL
GLUCOSE SERPL-MCNC: 95 MG/DL (ref 65–99)
HCT VFR BLD AUTO: 37.3 % (ref 34–46.6)
HGB BLD-MCNC: 12.3 G/DL (ref 12–15.9)
HOLD SPECIMEN: NORMAL
LYMPHOCYTES # BLD AUTO: 1.72 10*3/MM3 (ref 0.7–3.1)
LYMPHOCYTES NFR BLD AUTO: 37.8 % (ref 19.6–45.3)
MCH RBC QN AUTO: 31.5 PG (ref 26.6–33)
MCHC RBC AUTO-ENTMCNC: 33 G/DL (ref 31.5–35.7)
MCV RBC AUTO: 95.4 FL (ref 79–97)
MONOCYTES # BLD AUTO: 0.49 10*3/MM3 (ref 0.1–0.9)
MONOCYTES NFR BLD AUTO: 10.8 % (ref 5–12)
NEUTROPHILS NFR BLD AUTO: 2.14 10*3/MM3 (ref 1.7–7)
NEUTROPHILS NFR BLD AUTO: 47 % (ref 42.7–76)
PLATELET # BLD AUTO: 162 10*3/MM3 (ref 140–450)
PMV BLD AUTO: 9.8 FL (ref 6–12)
POTASSIUM SERPL-SCNC: 3.7 MMOL/L (ref 3.5–5.2)
PROT SERPL-MCNC: 7.2 G/DL (ref 6–8.5)
RBC # BLD AUTO: 3.91 10*6/MM3 (ref 3.77–5.28)
SODIUM SERPL-SCNC: 142 MMOL/L (ref 136–145)
WBC NRBC COR # BLD: 4.55 10*3/MM3 (ref 3.4–10.8)

## 2022-10-21 PROCEDURE — 36415 COLL VENOUS BLD VENIPUNCTURE: CPT

## 2022-10-21 PROCEDURE — 85025 COMPLETE CBC W/AUTO DIFF WBC: CPT | Performed by: NURSE PRACTITIONER

## 2022-10-21 PROCEDURE — 99214 OFFICE O/P EST MOD 30 MIN: CPT | Performed by: NURSE PRACTITIONER

## 2022-10-21 PROCEDURE — 80053 COMPREHEN METABOLIC PANEL: CPT | Performed by: NURSE PRACTITIONER

## 2022-10-21 RX ORDER — DICYCLOMINE HCL 20 MG
TABLET ORAL
COMMUNITY
Start: 2022-10-12

## 2022-10-21 RX ORDER — NAPROXEN 500 MG/1
500 TABLET ORAL 2 TIMES DAILY WITH MEALS
COMMUNITY
Start: 2022-08-23

## 2022-12-08 ENCOUNTER — HOSPITAL ENCOUNTER (OUTPATIENT)
Dept: ONCOLOGY | Facility: HOSPITAL | Age: 57
Setting detail: INFUSION SERIES
Discharge: HOME | End: 2022-12-08

## 2022-12-08 VITALS — HEIGHT: 65 IN | WEIGHT: 168 LBS | BODY MASS INDEX: 27.99 KG/M2

## 2022-12-08 DIAGNOSIS — C50.919 MALIGNANT NEOPLASM OF FEMALE BREAST, UNSPECIFIED ESTROGEN RECEPTOR STATUS, UNSPECIFIED LATERALITY, UNSPECIFIED SITE OF BREAST: Primary | ICD-10-CM

## 2022-12-08 PROCEDURE — 25010000002 DENOSUMAB 60 MG/ML SOLUTION PREFILLED SYRINGE

## 2022-12-08 PROCEDURE — 96372 THER/PROPH/DIAG INJ SC/IM: CPT

## 2022-12-08 RX ADMIN — DENOSUMAB 60 MG: 60 INJECTION SUBCUTANEOUS at 11:47

## 2023-04-10 RX ORDER — LETROZOLE 2.5 MG/1
TABLET, FILM COATED ORAL
Qty: 90 TABLET | Refills: 2 | Status: SHIPPED | OUTPATIENT
Start: 2023-04-10

## 2023-04-21 ENCOUNTER — TELEPHONE (OUTPATIENT)
Dept: ONCOLOGY | Facility: CLINIC | Age: 58
End: 2023-04-21

## 2023-04-21 NOTE — TELEPHONE ENCOUNTER
"    Caller: Angy Davies \"HOMER\"    Relationship to patient: Self    Best call back number: [phone]    Chief complaint: SICK     Type of visit: LAB & F/U 1,     PLEASE [facility]ENRI[facility] HER PROLIA INJECTION FOR AROUND 6/8/2023 WOULD LIKE A FRI AS WELL     Requested date: ON FRI, CALL TO R/S     If rescheduling, when is the original appointment: 4/21/2023    "

## 2023-05-01 ENCOUNTER — APPOINTMENT (OUTPATIENT)
Dept: CT IMAGING | Facility: HOSPITAL | Age: 58
End: 2023-05-01
Payer: COMMERCIAL

## 2023-05-01 ENCOUNTER — HOSPITAL ENCOUNTER (OUTPATIENT)
Facility: HOSPITAL | Age: 58
Setting detail: OBSERVATION
Discharge: HOME | End: 2023-05-04
Attending: EMERGENCY MEDICINE | Admitting: INTERNAL MEDICINE
Payer: COMMERCIAL

## 2023-05-01 DIAGNOSIS — R19.7 DIARRHEA, UNSPECIFIED TYPE: Primary | ICD-10-CM

## 2023-05-01 DIAGNOSIS — N17.9 AKI (ACUTE KIDNEY INJURY): ICD-10-CM

## 2023-05-01 DIAGNOSIS — E87.6 HYPOKALEMIA: ICD-10-CM

## 2023-05-01 LAB
ADV 40+41 DNA STL QL NAA+NON-PROBE: NOT DETECTED
ALBUMIN SERPL-MCNC: 4.6 G/DL (ref 3.5–5.2)
ALBUMIN/GLOB SERPL: 1.5 G/DL
ALP SERPL-CCNC: 83 U/L (ref 39–117)
ALT SERPL W P-5'-P-CCNC: 24 U/L (ref 1–33)
ANION GAP SERPL CALCULATED.3IONS-SCNC: 11 MMOL/L (ref 5–15)
AST SERPL-CCNC: 20 U/L (ref 1–32)
ASTRO TYP 1-8 RNA STL QL NAA+NON-PROBE: NOT DETECTED
BACTERIA UR QL AUTO: ABNORMAL /HPF
BASOPHILS # BLD AUTO: 0 10*3/MM3 (ref 0–0.2)
BASOPHILS NFR BLD AUTO: 0.5 % (ref 0–1.5)
BILIRUB SERPL-MCNC: 0.3 MG/DL (ref 0–1.2)
BILIRUB UR QL STRIP: NEGATIVE
BUN SERPL-MCNC: 28 MG/DL (ref 6–20)
BUN/CREAT SERPL: 22.2 (ref 7–25)
C CAYETANENSIS DNA STL QL NAA+NON-PROBE: NOT DETECTED
C COLI+JEJ+UPSA DNA STL QL NAA+NON-PROBE: NOT DETECTED
CALCIUM SPEC-SCNC: 9.5 MG/DL (ref 8.6–10.5)
CHLORIDE SERPL-SCNC: 103 MMOL/L (ref 98–107)
CLARITY UR: CLEAR
CO2 SERPL-SCNC: 25 MMOL/L (ref 22–29)
COLOR UR: YELLOW
CREAT SERPL-MCNC: 1.26 MG/DL (ref 0.57–1)
CRYPTOSP DNA STL QL NAA+NON-PROBE: NOT DETECTED
DEPRECATED RDW RBC AUTO: 39.8 FL (ref 37–54)
E HISTOLYT DNA STL QL NAA+NON-PROBE: NOT DETECTED
EAEC PAA PLAS AGGR+AATA ST NAA+NON-PRB: NOT DETECTED
EC STX1+STX2 GENES STL QL NAA+NON-PROBE: NOT DETECTED
EGFRCR SERPLBLD CKD-EPI 2021: 49.9 ML/MIN/1.73
EOSINOPHIL # BLD AUTO: 0.1 10*3/MM3 (ref 0–0.4)
EOSINOPHIL NFR BLD AUTO: 0.9 % (ref 0.3–6.2)
EPEC EAE GENE STL QL NAA+NON-PROBE: NOT DETECTED
ERYTHROCYTE [DISTWIDTH] IN BLOOD BY AUTOMATED COUNT: 12.5 % (ref 12.3–15.4)
ETEC LTA+ST1A+ST1B TOX ST NAA+NON-PROBE: NOT DETECTED
G LAMBLIA DNA STL QL NAA+NON-PROBE: NOT DETECTED
GLOBULIN UR ELPH-MCNC: 3.1 GM/DL
GLUCOSE SERPL-MCNC: 104 MG/DL (ref 65–99)
GLUCOSE UR STRIP-MCNC: NEGATIVE MG/DL
HCT VFR BLD AUTO: 39.7 % (ref 34–46.6)
HGB BLD-MCNC: 13.9 G/DL (ref 12–15.9)
HGB UR QL STRIP.AUTO: ABNORMAL
HYALINE CASTS UR QL AUTO: ABNORMAL /LPF
KETONES UR QL STRIP: NEGATIVE
LEUKOCYTE ESTERASE UR QL STRIP.AUTO: NEGATIVE
LIPASE SERPL-CCNC: 44 U/L (ref 13–60)
LYMPHOCYTES # BLD AUTO: 1.4 10*3/MM3 (ref 0.7–3.1)
LYMPHOCYTES NFR BLD AUTO: 20.7 % (ref 19.6–45.3)
MAGNESIUM SERPL-MCNC: 2.1 MG/DL (ref 1.6–2.6)
MCH RBC QN AUTO: 31.6 PG (ref 26.6–33)
MCHC RBC AUTO-ENTMCNC: 35 G/DL (ref 31.5–35.7)
MCV RBC AUTO: 90.3 FL (ref 79–97)
MONOCYTES # BLD AUTO: 0.7 10*3/MM3 (ref 0.1–0.9)
MONOCYTES NFR BLD AUTO: 10.4 % (ref 5–12)
NEUTROPHILS NFR BLD AUTO: 4.4 10*3/MM3 (ref 1.7–7)
NEUTROPHILS NFR BLD AUTO: 67.5 % (ref 42.7–76)
NITRITE UR QL STRIP: NEGATIVE
NOROVIRUS GI+II RNA STL QL NAA+NON-PROBE: NOT DETECTED
NRBC BLD AUTO-RTO: 0 /100 WBC (ref 0–0.2)
P SHIGELLOIDES DNA STL QL NAA+NON-PROBE: NOT DETECTED
PH UR STRIP.AUTO: 5.5 [PH] (ref 5–8)
PLATELET # BLD AUTO: 287 10*3/MM3 (ref 140–450)
PMV BLD AUTO: 8.6 FL (ref 6–12)
POTASSIUM SERPL-SCNC: 2.6 MMOL/L (ref 3.5–5.2)
PROT SERPL-MCNC: 7.7 G/DL (ref 6–8.5)
PROT UR QL STRIP: NEGATIVE
RBC # BLD AUTO: 4.4 10*6/MM3 (ref 3.77–5.28)
RBC # UR STRIP: ABNORMAL /HPF
REF LAB TEST METHOD: ABNORMAL
RVA RNA STL QL NAA+NON-PROBE: NOT DETECTED
S ENT+BONG DNA STL QL NAA+NON-PROBE: NOT DETECTED
SAPO I+II+IV+V RNA STL QL NAA+NON-PROBE: NOT DETECTED
SHIGELLA SP+EIEC IPAH ST NAA+NON-PROBE: NOT DETECTED
SODIUM SERPL-SCNC: 139 MMOL/L (ref 136–145)
SP GR UR STRIP: 1.01 (ref 1–1.03)
SQUAMOUS #/AREA URNS HPF: ABNORMAL /HPF
UROBILINOGEN UR QL STRIP: ABNORMAL
V CHOL+PARA+VUL DNA STL QL NAA+NON-PROBE: NOT DETECTED
V CHOLERAE DNA STL QL NAA+NON-PROBE: NOT DETECTED
WBC # UR STRIP: ABNORMAL /HPF
WBC NRBC COR # BLD: 6.6 10*3/MM3 (ref 3.4–10.8)
Y ENTEROCOL DNA STL QL NAA+NON-PROBE: NOT DETECTED

## 2023-05-01 PROCEDURE — 81001 URINALYSIS AUTO W/SCOPE: CPT | Performed by: PHYSICIAN ASSISTANT

## 2023-05-01 PROCEDURE — 80053 COMPREHEN METABOLIC PANEL: CPT | Performed by: PHYSICIAN ASSISTANT

## 2023-05-01 PROCEDURE — 87507 IADNA-DNA/RNA PROBE TQ 12-25: CPT | Performed by: PHYSICIAN ASSISTANT

## 2023-05-01 PROCEDURE — 84439 ASSAY OF FREE THYROXINE: CPT | Performed by: INTERNAL MEDICINE

## 2023-05-01 PROCEDURE — G0378 HOSPITAL OBSERVATION PER HR: HCPCS

## 2023-05-01 PROCEDURE — 84443 ASSAY THYROID STIM HORMONE: CPT | Performed by: INTERNAL MEDICINE

## 2023-05-01 PROCEDURE — 83690 ASSAY OF LIPASE: CPT | Performed by: PHYSICIAN ASSISTANT

## 2023-05-01 PROCEDURE — 74177 CT ABD & PELVIS W/CONTRAST: CPT

## 2023-05-01 PROCEDURE — 83735 ASSAY OF MAGNESIUM: CPT | Performed by: PHYSICIAN ASSISTANT

## 2023-05-01 PROCEDURE — 99285 EMERGENCY DEPT VISIT HI MDM: CPT

## 2023-05-01 PROCEDURE — 85025 COMPLETE CBC W/AUTO DIFF WBC: CPT | Performed by: PHYSICIAN ASSISTANT

## 2023-05-01 PROCEDURE — 25510000001 IOPAMIDOL PER 1 ML: Performed by: EMERGENCY MEDICINE

## 2023-05-01 PROCEDURE — 85025 COMPLETE CBC W/AUTO DIFF WBC: CPT | Performed by: NURSE PRACTITIONER

## 2023-05-01 PROCEDURE — 96360 HYDRATION IV INFUSION INIT: CPT

## 2023-05-01 RX ORDER — SODIUM PHOSPHATE IN 0.9 % NACL 15MMOL/100
15 PLASTIC BAG, INJECTION (ML) INTRAVENOUS AS NEEDED
Status: DISCONTINUED | OUTPATIENT
Start: 2023-05-01 | End: 2023-05-04 | Stop reason: HOSPADM

## 2023-05-01 RX ORDER — SACCHAROMYCES BOULARDII 250 MG
250 CAPSULE ORAL 2 TIMES DAILY
Status: DISCONTINUED | OUTPATIENT
Start: 2023-05-01 | End: 2023-05-04 | Stop reason: HOSPADM

## 2023-05-01 RX ORDER — HYDRALAZINE HYDROCHLORIDE 20 MG/ML
10 INJECTION INTRAMUSCULAR; INTRAVENOUS EVERY 6 HOURS PRN
Status: DISCONTINUED | OUTPATIENT
Start: 2023-05-01 | End: 2023-05-04 | Stop reason: HOSPADM

## 2023-05-01 RX ORDER — POTASSIUM CHLORIDE 20 MEQ/1
40 TABLET, EXTENDED RELEASE ORAL DAILY
Status: DISCONTINUED | OUTPATIENT
Start: 2023-05-01 | End: 2023-05-04 | Stop reason: HOSPADM

## 2023-05-01 RX ORDER — POTASSIUM CHLORIDE 7.45 MG/ML
10 INJECTION INTRAVENOUS
Status: DISCONTINUED | OUTPATIENT
Start: 2023-05-01 | End: 2023-05-04 | Stop reason: HOSPADM

## 2023-05-01 RX ORDER — ACETAMINOPHEN 325 MG/1
650 TABLET ORAL EVERY 4 HOURS PRN
Status: DISCONTINUED | OUTPATIENT
Start: 2023-05-01 | End: 2023-05-04 | Stop reason: HOSPADM

## 2023-05-01 RX ORDER — POTASSIUM PHOS IN 0.9 % NACL 30MMOL/250
30 PLASTIC BAG, INJECTION (ML) INTRAVENOUS AS NEEDED
Status: DISCONTINUED | OUTPATIENT
Start: 2023-05-01 | End: 2023-05-04 | Stop reason: HOSPADM

## 2023-05-01 RX ORDER — POTASSIUM CHLORIDE 1.5 G/1.77G
40 POWDER, FOR SOLUTION ORAL AS NEEDED
Status: DISCONTINUED | OUTPATIENT
Start: 2023-05-01 | End: 2023-05-04 | Stop reason: HOSPADM

## 2023-05-01 RX ORDER — SODIUM CHLORIDE 9 MG/ML
40 INJECTION, SOLUTION INTRAVENOUS AS NEEDED
Status: DISCONTINUED | OUTPATIENT
Start: 2023-05-01 | End: 2023-05-04 | Stop reason: HOSPADM

## 2023-05-01 RX ORDER — TRAMADOL HYDROCHLORIDE 50 MG/1
50 TABLET ORAL EVERY 12 HOURS PRN
COMMUNITY

## 2023-05-01 RX ORDER — MAGNESIUM SULFATE HEPTAHYDRATE 40 MG/ML
2 INJECTION, SOLUTION INTRAVENOUS AS NEEDED
Status: DISCONTINUED | OUTPATIENT
Start: 2023-05-01 | End: 2023-05-04 | Stop reason: HOSPADM

## 2023-05-01 RX ORDER — ONDANSETRON 4 MG/1
4 TABLET, FILM COATED ORAL EVERY 6 HOURS PRN
Status: DISCONTINUED | OUTPATIENT
Start: 2023-05-01 | End: 2023-05-04 | Stop reason: HOSPADM

## 2023-05-01 RX ORDER — AMITRIPTYLINE HYDROCHLORIDE 25 MG/1
25 TABLET, FILM COATED ORAL NIGHTLY
COMMUNITY

## 2023-05-01 RX ORDER — SODIUM CHLORIDE 0.9 % (FLUSH) 0.9 %
10 SYRINGE (ML) INJECTION AS NEEDED
Status: DISCONTINUED | OUTPATIENT
Start: 2023-05-01 | End: 2023-05-04 | Stop reason: HOSPADM

## 2023-05-01 RX ORDER — LEVOTHYROXINE SODIUM 0.05 MG/1
50 TABLET ORAL DAILY
Status: DISCONTINUED | OUTPATIENT
Start: 2023-05-02 | End: 2023-05-04 | Stop reason: HOSPADM

## 2023-05-01 RX ORDER — SODIUM CHLORIDE 9 MG/ML
100 INJECTION, SOLUTION INTRAVENOUS CONTINUOUS
Status: DISCONTINUED | OUTPATIENT
Start: 2023-05-01 | End: 2023-05-04

## 2023-05-01 RX ORDER — FERROUS SULFATE TAB EC 324 MG (65 MG FE EQUIVALENT) 324 (65 FE) MG
324 TABLET DELAYED RESPONSE ORAL
Status: DISCONTINUED | OUTPATIENT
Start: 2023-05-02 | End: 2023-05-04 | Stop reason: HOSPADM

## 2023-05-01 RX ORDER — ONDANSETRON 2 MG/ML
4 INJECTION INTRAMUSCULAR; INTRAVENOUS EVERY 6 HOURS PRN
Status: DISCONTINUED | OUTPATIENT
Start: 2023-05-01 | End: 2023-05-04 | Stop reason: HOSPADM

## 2023-05-01 RX ORDER — SODIUM CHLORIDE 0.9 % (FLUSH) 0.9 %
3 SYRINGE (ML) INJECTION EVERY 12 HOURS SCHEDULED
Status: DISCONTINUED | OUTPATIENT
Start: 2023-05-01 | End: 2023-05-04 | Stop reason: HOSPADM

## 2023-05-01 RX ORDER — PANTOPRAZOLE SODIUM 40 MG/1
40 TABLET, DELAYED RELEASE ORAL
Status: DISCONTINUED | OUTPATIENT
Start: 2023-05-02 | End: 2023-05-04 | Stop reason: HOSPADM

## 2023-05-01 RX ORDER — POTASSIUM CHLORIDE 20 MEQ/1
40 TABLET, EXTENDED RELEASE ORAL AS NEEDED
Status: DISCONTINUED | OUTPATIENT
Start: 2023-05-01 | End: 2023-05-04 | Stop reason: HOSPADM

## 2023-05-01 RX ORDER — MAGNESIUM SULFATE 1 G/100ML
1 INJECTION INTRAVENOUS AS NEEDED
Status: DISCONTINUED | OUTPATIENT
Start: 2023-05-01 | End: 2023-05-04 | Stop reason: HOSPADM

## 2023-05-01 RX ORDER — SODIUM CHLORIDE 0.9 % (FLUSH) 0.9 %
3-10 SYRINGE (ML) INJECTION AS NEEDED
Status: DISCONTINUED | OUTPATIENT
Start: 2023-05-01 | End: 2023-05-04 | Stop reason: HOSPADM

## 2023-05-01 RX ORDER — FENTANYL/ROPIVACAINE/NS/PF 2-625MCG/1
15 PLASTIC BAG, INJECTION (ML) EPIDURAL AS NEEDED
Status: DISCONTINUED | OUTPATIENT
Start: 2023-05-01 | End: 2023-05-04 | Stop reason: HOSPADM

## 2023-05-01 RX ADMIN — POTASSIUM CHLORIDE 40 MEQ: 1500 TABLET, EXTENDED RELEASE ORAL at 13:01

## 2023-05-01 RX ADMIN — SODIUM CHLORIDE 100 ML/HR: 9 INJECTION, SOLUTION INTRAVENOUS at 17:11

## 2023-05-01 RX ADMIN — SODIUM CHLORIDE 1000 ML: 9 INJECTION, SOLUTION INTRAVENOUS at 11:50

## 2023-05-01 RX ADMIN — Medication 250 MG: at 20:46

## 2023-05-01 RX ADMIN — IOPAMIDOL 100 ML: 755 INJECTION, SOLUTION INTRAVENOUS at 13:51

## 2023-05-01 RX ADMIN — Medication 3 ML: at 20:46

## 2023-05-01 NOTE — ED PROVIDER NOTES
Subjective   History of Present Illness  PatiPatient is a 57-year-old female presents to the ED with complaints of diarrhea for the past 2 weeks.  She states it is nonbloody.  She states has had a 30 pound weight loss over the past 2 weeks.  She reports subjective fever.  She also reports mid abdominal pain that she describes as an achy type pain currently rates it a 4/10 in severity and states is nonradiating from her abdomen.  She denies any alleviating or exacerbating factors.  She denies any urinary complaints no nausea or vomiting.  Patient states she was seen by [facility] was given Flagyl with no improvement of her symptoms.  Reports she was also prescribed Cipro but states she cannot take this medication due to adverse reactions.  Patient last had a colonoscopy several years ago she states they removed some polyps but otherwise no abnormalities.  She states she is also done Cologuard since that was negative.ent is a 57-year-old female presents to the ED with complaints of diarrhea for the past 2 weeks.  She states it is nonbloody.  She states has had a 30 pound weight loss over the past 2 weeks.  She reports subjective fever.  She also reports mid abdominal pain that she describes as an achy type pain currently rates it a 4/10 in severity and states is nonradiating from her abdomen.  She denies any alleviating or exacerbating factors.  She denies any urinary complaints no nausea or vomiting.  Patient states she was seen by [facility] was given Flagyl with no improvement of her symptoms.  Reports she was also prescribed Cipro but states she cannot take this medication due to adverse reactions.  Patient last had a colonoscopy several years ago she states they removed some polyps but otherwise no abnormalities.  She states she is also done Cologuard since that was negative.  Review of Systems   Constitutional: Positive for fever and unexpected weight change. Negative for activity  change, appetite change, chills, diaphoresis and fatigue.   HENT: Negative.    Respiratory: Negative.    Cardiovascular: Negative.    Gastrointestinal: Positive for abdominal pain and diarrhea. Negative for abdominal distention, blood in stool, constipation, nausea and vomiting.   Genitourinary: Negative.    Musculoskeletal: Negative for back pain, neck pain and neck stiffness.   Skin: Negative.    Neurological: Negative.    Hematological: Negative.        Past Medical History:   Diagnosis Date   • Allergic    • Arthritis    • Asthma    • Breast cancer    • Cataract    • Chronic diarrhea    • Headache    • Hx of radiation therapy    • Injury of back    • Osteoporosis        Allergies   Allergen Reactions   • Aspirin Nausea And Vomiting   • Penicillins GI Intolerance   • Letrozole Rash       Past Surgical History:   Procedure Laterality Date   • BREAST BIOPSY     • BREAST LUMPECTOMY     • HYSTERECTOMY         Family History   Problem Relation Age of Onset   • Breast cancer Maternal Grandmother    • Ovarian cancer Mother        Social History     Socioeconomic History   • Marital status:    Tobacco Use   • Smoking status: Former     Years: 2.00     Types: Cigarettes   • Smokeless tobacco: Never   Substance and Sexual Activity   • Alcohol use: Yes     Comment: rarely   • Drug use: Never   • Sexual activity: Defer           Objective   Physical Exam  Vitals and nursing note reviewed.   Constitutional:       General: She is not in acute distress.     Appearance: She is well-developed. She is obese. She is not ill-appearing, toxic-appearing or diaphoretic.   HENT:      Head: Normocephalic and atraumatic.      Mouth/Throat:      Mouth: Mucous membranes are moist.      Pharynx: Oropharynx is clear.   Eyes:      General: No scleral icterus.     Extraocular Movements: Extraocular movements intact.      Pupils: Pupils are equal, round, and reactive to light.   Cardiovascular:      Rate and Rhythm: Normal rate and  "regular rhythm.      Pulses: Normal pulses.      Heart sounds: No murmur heard.    No friction rub. No gallop.   Pulmonary:      Effort: Pulmonary effort is normal. No respiratory distress.      Breath sounds: Normal breath sounds. No stridor. No wheezing, rhonchi or rales.   Chest:      Chest wall: No tenderness.   Abdominal:      General: Bowel sounds are normal. There is no distension. There are no signs of injury.      Palpations: Abdomen is soft. There is no fluid wave, hepatomegaly, splenomegaly or mass.      Tenderness: There is abdominal tenderness in the epigastric area and periumbilical area. There is no right CVA tenderness, left CVA tenderness, guarding or rebound.      Hernia: No hernia is present.   Skin:     General: Skin is warm.      Capillary Refill: Capillary refill takes less than 2 seconds.      Coloration: Skin is not cyanotic, jaundiced or pale.      Findings: No rash.   Neurological:      General: No focal deficit present.      Mental Status: She is alert and oriented to person, place, and time.      GCS: GCS eye subscore is 4. GCS verbal subscore is 5. GCS motor subscore is 6.   Psychiatric:         Mood and Affect: Mood normal.         Behavior: Behavior normal.         Procedures           ED Course    /66   Pulse 73   Temp 98.3 °F (36.8 °C) (Oral)   Resp 18   Ht 165.1 cm (65\")   Wt 73.8 kg (162 lb 11.2 oz)   SpO2 100%   Breastfeeding No   BMI 27.07 kg/m²    Medications   sodium chloride 0.9 % flush 10 mL (has no administration in time range)   potassium chloride (K-DUR,KLOR-CON) CR tablet 40 mEq (40 mEq Oral Given 5/1/23 1301)   sodium chloride 0.9 % bolus 1,000 mL (1,000 mL Intravenous New Bag 5/1/23 1150)   iopamidol (ISOVUE-370) 76 % injection 100 mL (100 mL Intravenous Given 5/1/23 1351)     Labs Reviewed   COMPREHENSIVE METABOLIC PANEL - Abnormal; Notable for the following components:       Result Value    Glucose 104 (*)     BUN 28 (*)     Creatinine 1.26 (*)     " Potassium 2.6 (*)     eGFR 49.9 (*)     All other components within normal limits    Narrative:     GFR Normal >60  Chronic Kidney Disease <60  Kidney Failure <15     URINALYSIS W/ MICROSCOPIC IF INDICATED (NO CULTURE) - Abnormal; Notable for the following components:    Blood, UA Trace (*)     All other components within normal limits   URINALYSIS, MICROSCOPIC ONLY - Abnormal; Notable for the following components:    RBC, UA 0-2 (*)     WBC, UA 0-2 (*)     All other components within normal limits   GASTROINTESTINAL PANEL, PCR - Normal    Narrative:     If Aeromonas, Staphylococcus aureus or Bacillus cereus are suspected, please order [id number]: Stool Culture, Aeromonas, S aureus, B Cereus.   LIPASE - Normal   CBC WITH AUTO DIFFERENTIAL - Normal   MAGNESIUM - Normal   CBC AND DIFFERENTIAL    Narrative:     The following orders were created for panel order CBC & Differential.  Procedure                               Abnormality         Status                     ---------                               -----------         ------                     CBC Auto Differential[[id number]]        Normal              Final result                 Please view results for these tests on the individual orders.     CT Abdomen Pelvis With Contrast    Result Date: 5/1/2023  Impression: 1. No acute abnormality in the abdomen or pelvis. 2. Dilatation of the common bile duct measuring 11 mm may relate to cholecystectomy, correlate with LFTs to exclude obstructive LFT pattern. 3. Prior cholecystectomy, hysterectomy, and appendectomy. 4. Additional chronic findings above. Electronically Signed: Tera Hernandez  5/1/2023 2:04 PM EDT  Workstation ID: [id number]                                           Medical Decision Making  Chart Review: Discharge summary reviewed from 8/6/2022 was admitted for cardiac work-up had a normal stress test, EKG, and troponins    Comorbidity: As per past medical history  Differentials: Dehydration, tumor,  intra-abdominal infection, dissection, peritonitis, peptic ulcer disease, pancreatitis, hepatitis, ischemic bowel, bowel obstruction, appendicitis      ;this list is not all inclusive and does not constitute the entirety of considered causes  Labs: As above  Radiology: My interpretation CT abdomen and pelvis shows no obvious bowel obstruction correlated with radiologist interpretation as below  CT Abdomen Pelvis With Contrast   Final Result    Impression:    1. No acute abnormality in the abdomen or pelvis.    2. Dilatation of the common bile duct measuring 11 mm may relate to cholecystectomy, correlate with LFTs to exclude obstructive LFT pattern.     3. Prior cholecystectomy, hysterectomy, and appendectomy.    4. Additional chronic findings above.       Electronically Signed: Tera Hernandez      5/1/2023 2:04 PM EDT      Workstation ID: [id number]       Disposition/Treatment:  Appropriate PPE was worn during exam and throughout all encounters with the patient.  While in the ED IV was placed and labs were obtained patient was placed on proper monitors she was afebrile and appeared nontoxic presenting with complaints of diarrhea for the past 2 weeks.  States she has been on Flagyl with no improvement.    Lab results today significant for potassium of 2.6 which was replaced while in the ED via p.o.  Did have a bump in her kidney function with a BUN of 28 creatinine 1.26 she was hydrated while in the ED.  Glucose 104 but no signs of DKA.  CBC unremarkable with a normal white count.  Lipase and magnesium normal.  Urinalysis unremarkable for UTI stool panel negative.  CT abdomen pelvis shows no acute infection or obstruction.  Of note there was common bile duct dilation likely secondary to cholecystectomy as she has normal LFTs.    Upon reassessment she is resting quietly findings were discussed with the patient who voiced understand admission for electrolyte replacement and hydration.  She was in agreement with plan.  Spoke  to ELIJAH Javier who was in agreeme admission through PCP group    Amount and/or Complexity of Data Reviewed  External Data Reviewed: notes.  Labs: ordered. Decision-making details documented in ED Course.  Radiology: ordered. Decision-making details documented in ED Course.      Risk  Prescription drug management.  Decision regarding hospitalization.          Final diagnoses:   Diarrhea, unspecified type   ELEUTERIO (acute kidney injury)   Hypokalemia       ED Disposition  ED Disposition     ED Disposition   Decision to Admit    Condition   --    Comment   Level of Care: Med/Surg [1]   Admitting Physician: HA JUAREZ [[id number]]   Attending Physician: HA JUAREZ [[id number]]               No follow-up provider specified.       Medication List      No changes were made to your prescriptions during this visit.          Jasmin Rush PA  05/01/23 1534

## 2023-05-01 NOTE — ED NOTES
Nursing report ED to floor  Angy Davies  57 y.o.  female    HPI:   Chief Complaint   Patient presents with    Diarrhea       Admitting doctor:   Randa Richardson MD    Admitting diagnosis:   The primary encounter diagnosis was Diarrhea, unspecified type. Diagnoses of ELEUTERIO (acute kidney injury) and Hypokalemia were also pertinent to this visit.    Code status:   Current Code Status       Date Active Code Status Order ID Comments User Context       Prior            Allergies:   Aspirin, Penicillins, and Letrozole    Isolation:  No active isolations     Fall Risk:  Fall Risk Assessment was completed, and patient is at low risk for falls.   Predictive Model Details         6 (Low) Factor Value    Calculated 5/1/2023 14:08 Age 57    Risk of Fall Model Musculoskeletal Assessment WDL     Active Peripheral IV Present     Imaging order in this encounter Present     Respiratory Rate 18     Skin Assessment WDL     Magnesium 2.1 mg/dL     Diastolic BP 66     Drug Use No     Tobacco Use Quit     Daniel Scale not on file     Number of Distinct Medication Classes administered 2     Financial Class Private Insurance     Creatinine 1.26 mg/dL     Peripheral Vascular Assessment WDL     Gastrointestinal Assessment WDL     Cardiac Assessment WDL     ALT 24 U/L     Chloride 103 mmol/L     Days after Admission 0.137     Calcium 9.5 mg/dL     Potassium 2.6 mmol/L     Total Bilirubin 0.3 mg/dL     Albumin 4.6 g/dL         Weight:       05/01/23  1035   Weight: 73.8 kg (162 lb 11.2 oz)       Intake and Output    Intake/Output Summary (Last 24 hours) at 5/1/2023 1554  Last data filed at 5/1/2023 1250  Gross per 24 hour   Intake 1000 ml   Output --   Net 1000 ml       Diet:        Most recent vitals:   Vitals:    05/01/23 1158 05/01/23 1213 05/01/23 1301 05/01/23 1306   BP: 97/68 97/71 102/66    BP Location:       Patient Position:       Pulse: 77 74 73    Resp:       Temp:       TempSrc:       SpO2: 100% 97%  100%   Weight:       Height:            Active LDAs/IV Access:   Lines, Drains & Airways       Active LDAs       Name Placement date Placement time Site Days    Peripheral IV 05/01/23 1140 Anterior;Left;Proximal Forearm 05/01/23  1140  Forearm  less than 1                    Skin Condition:   Skin Assessments (last day)       Date/Time Skin WDL    05/01/23 12:37:42 WDL             Labs (abnormal labs have a star):   Labs Reviewed   COMPREHENSIVE METABOLIC PANEL - Abnormal; Notable for the following components:       Result Value    Glucose 104 (*)     BUN 28 (*)     Creatinine 1.26 (*)     Potassium 2.6 (*)     eGFR 49.9 (*)     All other components within normal limits    Narrative:     GFR Normal >60  Chronic Kidney Disease <60  Kidney Failure <15     URINALYSIS W/ MICROSCOPIC IF INDICATED (NO CULTURE) - Abnormal; Notable for the following components:    Blood, UA Trace (*)     All other components within normal limits   URINALYSIS, MICROSCOPIC ONLY - Abnormal; Notable for the following components:    RBC, UA 0-2 (*)     WBC, UA 0-2 (*)     All other components within normal limits   GASTROINTESTINAL PANEL, PCR - Normal    Narrative:     If Aeromonas, Staphylococcus aureus or Bacillus cereus are suspected, please order [id number]: Stool Culture, Aeromonas, S aureus, B Cereus.   LIPASE - Normal   CBC WITH AUTO DIFFERENTIAL - Normal   MAGNESIUM - Normal   CBC AND DIFFERENTIAL    Narrative:     The following orders were created for panel order CBC & Differential.  Procedure                               Abnormality         Status                     ---------                               -----------         ------                     CBC Auto Differential[[id number]]        Normal              Final result                 Please view results for these tests on the individual orders.       LOC: Person, Place, Time, and Situation    Telemetry:  Med/Surg    Cardiac Monitoring Ordered: yes    EKG:   No orders to display       Medications Given in the ED:    Medications   sodium chloride 0.9 % flush 10 mL (has no administration in time range)   potassium chloride (K-DUR,KLOR-CON) CR tablet 40 mEq (40 mEq Oral Given 5/1/23 1301)   sodium chloride 0.9 % bolus 1,000 mL (0 mL Intravenous Stopped 5/1/23 1250)   iopamidol (ISOVUE-370) 76 % injection 100 mL (100 mL Intravenous Given 5/1/23 1351)       Imaging results:  CT Abdomen Pelvis With Contrast    Result Date: 5/1/2023  Impression: 1. No acute abnormality in the abdomen or pelvis. 2. Dilatation of the common bile duct measuring 11 mm may relate to cholecystectomy, correlate with LFTs to exclude obstructive LFT pattern. 3. Prior cholecystectomy, hysterectomy, and appendectomy. 4. Additional chronic findings above. Electronically Signed: Tera Hernandez  5/1/2023 2:04 PM EDT  Workstation ID: [id number]     Social issues:   Social History     Socioeconomic History    Marital status:    Tobacco Use    Smoking status: Former     Years: 2.00     Types: Cigarettes    Smokeless tobacco: Never   Substance and Sexual Activity    Alcohol use: Yes     Comment: rarely    Drug use: Never    Sexual activity: Defer       NIH Stroke Scale:  Interval: (not recorded)  1a. Level of Consciousness: (not recorded)  1b. LOC Questions: (not recorded)  1c. LOC Commands: (not recorded)  2. Best Gaze: (not recorded)  3. Visual: (not recorded)  4. Facial Palsy: (not recorded)  5a. Motor Arm, Left: (not recorded)  5b. Motor Arm, Right: (not recorded)  6a. Motor Leg, Left: (not recorded)  6b. Motor Leg, Right: (not recorded)  7. Limb Ataxia: (not recorded)  8. Sensory: (not recorded)  9. Best Language: (not recorded)  10. Dysarthria: (not recorded)  11. Extinction and Inattention (formerly Neglect): (not recorded)    Total (NIH Stroke Scale): (not recorded)     Additional notable assessment information:     Nursing report ED to floor:  Donna Paul LPN   05/01/23 15:54 EDT

## 2023-05-01 NOTE — H&P
Patient Care Team:  Germán Spencer FNP as PCP - Juliette Baird MD as Consulting Physician (Hematology and Oncology)    Chief complaint Diarrhea/dehydration    Subjective     Patient is a 57 y.o. female who presents with diarrhea has been going on for nearly 3 weeks.  She was seen in urgent care and they started her on oral antibiotics thinking it was a virus.GI panel in the ED was negative, patient creatinine elevated electrolytes and balanced.  Patient be admitted for hydration and electrolyte management.    Review of Systems   Constitutional: Positive for appetite change.   HENT: Negative.    Respiratory: Negative.    Cardiovascular: Negative.    Gastrointestinal: Positive for diarrhea.   Genitourinary: Negative.    Musculoskeletal: Negative.    Psychiatric/Behavioral: Negative.           History  Past Medical History:   Diagnosis Date   • Allergic    • Arthritis    • Asthma    • Breast cancer    • Cataract    • Chronic diarrhea    • Headache    • Hx of radiation therapy    • Injury of back    • Osteoporosis      Past Surgical History:   Procedure Laterality Date   • BREAST BIOPSY     • BREAST LUMPECTOMY     • HYSTERECTOMY       Family History   Problem Relation Age of Onset   • Breast cancer Maternal Grandmother    • Ovarian cancer Mother      Social History     Tobacco Use   • Smoking status: Former     Years: 2.00     Types: Cigarettes   • Smokeless tobacco: Never   Substance Use Topics   • Alcohol use: Yes     Comment: rarely   • Drug use: Never     (Not in a hospital admission)    Allergies:  Aspirin, Penicillins, and Letrozole    Objective     Vital Signs  Temp:  [98.3 °F (36.8 °C)] 98.3 °F (36.8 °C)  Heart Rate:  [] 78  Resp:  [18] 18  BP: ()/(59-81) 106/81       Physical Exam  Vitals reviewed.   Constitutional:       Appearance: She is not ill-appearing.   HENT:      Head: Normocephalic and atraumatic.      Right Ear: External ear normal.      Left Ear: External ear  normal.      Nose: Nose normal.      Mouth/Throat:      Mouth: Mucous membranes are moist.   Eyes:      General:         Right eye: No discharge.         Left eye: No discharge.   Cardiovascular:      Rate and Rhythm: Normal rate and regular rhythm.      Pulses: Normal pulses.      Heart sounds: Normal heart sounds.   Pulmonary:      Effort: Pulmonary effort is normal.      Breath sounds: Normal breath sounds.   Abdominal:      General: Bowel sounds are normal.      Palpations: Abdomen is soft.   Musculoskeletal:         General: Normal range of motion.      Cervical back: Normal range of motion.   Skin:     General: Skin is warm and dry.   Neurological:      Mental Status: She is alert and oriented to person, place, and time.   Psychiatric:         Behavior: Behavior normal.          Results Review:     Imaging Results (Last 24 Hours)     Procedure Component Value Units Date/Time    CT Abdomen Pelvis With Contrast [[id number]] Collected: 05/01/23 1357     Updated: 05/01/23 1406    Narrative:      CT ABDOMEN PELVIS W CONTRAST    Date of Exam: 5/1/2023 1:35 PM EDT    Indication: Abdominal pain and diarrhea x2 weeks with 30 pound weight loss.    Comparison: None available.    Technique: Axial CT images were obtained of the abdomen and pelvis following the uneventful intravenous administration of iodinated contrast. Sagittal and coronal reconstructions were performed.  Automated exposure control and iterative reconstruction   methods were used.    Findings:  Lung bases are without consolidation. Negative for pericardial effusion or pleural effusion.    The liver and spleen are normal in size and contour. The gallbladder is absent. Mild dilatation the common bile duct measuring 11 mm statistically likely related to postcholecystectomy state. Kidneys are symmetrically enhancing. No hydronephrosis or   hydroureter. The urinary bladder is collapsed. The uterus is absent. Negative for adnexal mass. Pancreas without findings  of pancreatitis.    Negative for pneumoperitoneum. No bowel obstruction. Wall thickening of the descending and rectosigmoid colon appear secondary to underdistention. Fluid noted in the ascending colon. Appendix appears surgically absent. Stomach and proximal small bowel   normally configured.    The portal vein, splenic vein, and superior mesenteric vein are patent. Abdominal aorta and branch vasculature are patent with mild vascular calcifications. No fluid collection within the abdomen or pelvis. No aggressive osseous lesion or acute fracture.      Impression:      Impression:  1. No acute abnormality in the abdomen or pelvis.  2. Dilatation of the common bile duct measuring 11 mm may relate to cholecystectomy, correlate with LFTs to exclude obstructive LFT pattern.   3. Prior cholecystectomy, hysterectomy, and appendectomy.  4. Additional chronic findings above.            Electronically Signed: Tera Mouser    5/1/2023 2:04 PM EDT    Workstation ID: [id number]           Lab Results (last 24 hours)     Procedure Component Value Units Date/Time    Gastrointestinal Panel, PCR - Stool, Per Rectum [[id number]]  (Normal) Collected: 05/01/23 1235    Specimen: Stool from Per Rectum Updated: 05/01/23 1411     Campylobacter Not Detected     Plesiomonas shigelloides Not Detected     Salmonella Not Detected     Vibrio Not Detected     Vibrio cholerae Not Detected     Yersinia enterocolitica Not Detected     Enteroaggregative E. coli (EAEC) Not Detected     Enteropathogenic E. coli (EPEC) Not Detected     Enterotoxigenic E. coli (ETEC) lt/st Not Detected     Shiga-like toxin-producing E. coli (STEC) stx1/stx2 Not Detected     Shigella/Enteroinvasive E. coli (EIEC) Not Detected     Cryptosporidium Not Detected     Cyclospora cayetanensis Not Detected     Entamoeba histolytica Not Detected     Giardia lamblia Not Detected     Adenovirus F40/41 Not Detected     Astrovirus Not Detected     Norovirus GI/GII Not Detected      Rotavirus A Not Detected     Sapovirus (I, II, IV or V) Not Detected    Narrative:      If Aeromonas, Staphylococcus aureus or Bacillus cereus are suspected, please order [id number]: Stool Culture, Aeromonas, S aureus, B Cereus.    Magnesium [[id number]]  (Normal) Collected: 05/01/23 1150    Specimen: Blood Updated: 05/01/23 1312     Magnesium 2.1 mg/dL     Comprehensive Metabolic Panel [[id number]]  (Abnormal) Collected: 05/01/23 1150    Specimen: Blood Updated: 05/01/23 1252     Glucose 104 mg/dL      BUN 28 mg/dL      Creatinine 1.26 mg/dL      Sodium 139 mmol/L      Potassium 2.6 mmol/L      Chloride 103 mmol/L      CO2 25.0 mmol/L      Calcium 9.5 mg/dL      Total Protein 7.7 g/dL      Albumin 4.6 g/dL      ALT (SGPT) 24 U/L      AST (SGOT) 20 U/L      Alkaline Phosphatase 83 U/L      Total Bilirubin 0.3 mg/dL      Globulin 3.1 gm/dL      A/G Ratio 1.5 g/dL      BUN/Creatinine Ratio 22.2     Anion Gap 11.0 mmol/L      eGFR 49.9 mL/min/1.73     Narrative:      GFR Normal >60  Chronic Kidney Disease <60  Kidney Failure <15      Lipase [[id number]]  (Normal) Collected: 05/01/23 1150    Specimen: Blood Updated: 05/01/23 1249     Lipase 44 U/L     Urinalysis With Microscopic If Indicated (No Culture) - Urine, Clean Catch [[id number]]  (Abnormal) Collected: 05/01/23 1235    Specimen: Urine, Clean Catch Updated: 05/01/23 1247     Color, UA Yellow     Appearance, UA Clear     pH, UA 5.5     Specific Gravity, UA 1.011     Glucose, UA Negative     Ketones, UA Negative     Bilirubin, UA Negative     Blood, UA Trace     Protein, UA Negative     Leuk Esterase, UA Negative     Nitrite, UA Negative     Urobilinogen, UA 0.2 E.U./dL    Urinalysis, Microscopic Only - Urine, Clean Catch [[id number]]  (Abnormal) Collected: 05/01/23 1235    Specimen: Urine, Clean Catch Updated: 05/01/23 1247     RBC, UA 0-2 /HPF      WBC, UA 0-2 /HPF      Bacteria, UA None Seen /HPF      Squamous Epithelial Cells, UA 0-2 /HPF      Hyaline Casts, UA 3-6  /LPF      Methodology Automated Microscopy    CBC & Differential [[id number]]  (Normal) Collected: 05/01/23 1150    Specimen: Blood Updated: 05/01/23 1238    Narrative:      The following orders were created for panel order CBC & Differential.  Procedure                               Abnormality         Status                     ---------                               -----------         ------                     CBC Auto Differential[[id number]]        Normal              Final result                 Please view results for these tests on the individual orders.    CBC Auto Differential [[id number]]  (Normal) Collected: 05/01/23 1150    Specimen: Blood Updated: 05/01/23 1238     WBC 6.60 10*3/mm3      RBC 4.40 10*6/mm3      Hemoglobin 13.9 g/dL      Hematocrit 39.7 %      MCV 90.3 fL      MCH 31.6 pg      MCHC 35.0 g/dL      RDW 12.5 %      RDW-SD 39.8 fl      MPV 8.6 fL      Platelets 287 10*3/mm3      Neutrophil % 67.5 %      Lymphocyte % 20.7 %      Monocyte % 10.4 %      Eosinophil % 0.9 %      Basophil % 0.5 %      Neutrophils, Absolute 4.40 10*3/mm3      Lymphocytes, Absolute 1.40 10*3/mm3      Monocytes, Absolute 0.70 10*3/mm3      Eosinophils, Absolute 0.10 10*3/mm3      Basophils, Absolute 0.00 10*3/mm3      nRBC 0.0 /100 WBC            I reviewed the patient's new clinical results.    Assessment & Plan     Diarrhea-GI panel negative will obtain C. Difficile  Hypotension-hold BP medication  Dehydration-see bolus continue IV fluid  Hypothyroid continue Synthroid      DVT prophylaxis: SCD  GI prophylaxis: Protonix        I discussed the patient's findings and my recommendations with patient.     CALEB Varela  05/01/23  16:30 EDT

## 2023-05-01 NOTE — LETTER
May 4, 2023     Patient: Angy Davies   YOB: 1965   Date of Visit: 5/1/2023       To Whom It May Concern:    Angy Davies was under our care from 5/1/23 to 5/4/23. It is my medical opinion that Angy Davies may return to work on Monday, May 8th .           Sincerely,        Dr. Randa Richardson     CC:   No Recipients

## 2023-05-01 NOTE — Clinical Note
Level of Care: Med/Surg [1]   Admitting Physician: HA JUAREZ [[id number]]   Attending Physician: HA JUAREZ [[id number]]

## 2023-05-02 ENCOUNTER — INPATIENT HOSPITAL (OUTPATIENT)
Dept: URBAN - METROPOLITAN AREA HOSPITAL 84 | Facility: HOSPITAL | Age: 58
End: 2023-05-02

## 2023-05-02 DIAGNOSIS — R19.7 DIARRHEA, UNSPECIFIED: ICD-10-CM

## 2023-05-02 LAB
ANION GAP SERPL CALCULATED.3IONS-SCNC: 10 MMOL/L (ref 5–15)
BASOPHILS # BLD AUTO: 0.1 10*3/MM3 (ref 0–0.2)
BASOPHILS NFR BLD AUTO: 0.6 % (ref 0–1.5)
BUN SERPL-MCNC: 23 MG/DL (ref 6–20)
BUN/CREAT SERPL: 20.2 (ref 7–25)
CALCIUM SPEC-SCNC: 9.2 MG/DL (ref 8.6–10.5)
CHLORIDE SERPL-SCNC: 107 MMOL/L (ref 98–107)
CO2 SERPL-SCNC: 24 MMOL/L (ref 22–29)
CREAT SERPL-MCNC: 1.14 MG/DL (ref 0.57–1)
DEPRECATED RDW RBC AUTO: 42.4 FL (ref 37–54)
EGFRCR SERPLBLD CKD-EPI 2021: 56.3 ML/MIN/1.73
EOSINOPHIL # BLD AUTO: 0.1 10*3/MM3 (ref 0–0.4)
EOSINOPHIL NFR BLD AUTO: 1.1 % (ref 0.3–6.2)
ERYTHROCYTE [DISTWIDTH] IN BLOOD BY AUTOMATED COUNT: 12.7 % (ref 12.3–15.4)
GLUCOSE SERPL-MCNC: 104 MG/DL (ref 65–99)
HCT VFR BLD AUTO: 38.3 % (ref 34–46.6)
HGB BLD-MCNC: 13.8 G/DL (ref 12–15.9)
LYMPHOCYTES # BLD AUTO: 2.6 10*3/MM3 (ref 0.7–3.1)
LYMPHOCYTES NFR BLD AUTO: 26.6 % (ref 19.6–45.3)
MCH RBC QN AUTO: 32.4 PG (ref 26.6–33)
MCHC RBC AUTO-ENTMCNC: 36.1 G/DL (ref 31.5–35.7)
MCV RBC AUTO: 89.8 FL (ref 79–97)
MONOCYTES # BLD AUTO: 1 10*3/MM3 (ref 0.1–0.9)
MONOCYTES NFR BLD AUTO: 10.2 % (ref 5–12)
NEUTROPHILS NFR BLD AUTO: 5.9 10*3/MM3 (ref 1.7–7)
NEUTROPHILS NFR BLD AUTO: 61.5 % (ref 42.7–76)
NRBC BLD AUTO-RTO: 0.3 /100 WBC (ref 0–0.2)
PLATELET # BLD AUTO: 239 10*3/MM3 (ref 140–450)
PMV BLD AUTO: 8.4 FL (ref 6–12)
POTASSIUM SERPL-SCNC: 2.6 MMOL/L (ref 3.5–5.2)
POTASSIUM SERPL-SCNC: 3.6 MMOL/L (ref 3.5–5.2)
RBC # BLD AUTO: 4.26 10*6/MM3 (ref 3.77–5.28)
SODIUM SERPL-SCNC: 141 MMOL/L (ref 136–145)
T4 FREE SERPL-MCNC: 1.16 NG/DL (ref 0.93–1.7)
TSH SERPL DL<=0.05 MIU/L-ACNC: 7.51 UIU/ML (ref 0.27–4.2)
WBC NRBC COR # BLD: 9.6 10*3/MM3 (ref 3.4–10.8)

## 2023-05-02 PROCEDURE — 99222 1ST HOSP IP/OBS MODERATE 55: CPT | Performed by: NURSE PRACTITIONER

## 2023-05-02 PROCEDURE — 96361 HYDRATE IV INFUSION ADD-ON: CPT

## 2023-05-02 PROCEDURE — 84132 ASSAY OF SERUM POTASSIUM: CPT | Performed by: INTERNAL MEDICINE

## 2023-05-02 PROCEDURE — 87449 NOS EACH ORGANISM AG IA: CPT | Performed by: INTERNAL MEDICINE

## 2023-05-02 PROCEDURE — 87324 CLOSTRIDIUM AG IA: CPT | Performed by: INTERNAL MEDICINE

## 2023-05-02 PROCEDURE — G0378 HOSPITAL OBSERVATION PER HR: HCPCS

## 2023-05-02 RX ORDER — SORBITOL SOLUTION 70 %
50 SOLUTION, ORAL MISCELLANEOUS ONCE
Status: DISCONTINUED | OUTPATIENT
Start: 2023-05-03 | End: 2023-05-04 | Stop reason: HOSPADM

## 2023-05-02 RX ORDER — SODIUM CHLORIDE 0.9 % (FLUSH) 0.9 %
3 SYRINGE (ML) INJECTION EVERY 12 HOURS SCHEDULED
Status: DISCONTINUED | OUTPATIENT
Start: 2023-05-02 | End: 2023-05-04

## 2023-05-02 RX ORDER — SODIUM CHLORIDE 9 MG/ML
40 INJECTION, SOLUTION INTRAVENOUS AS NEEDED
Status: DISCONTINUED | OUTPATIENT
Start: 2023-05-02 | End: 2023-05-04 | Stop reason: HOSPADM

## 2023-05-02 RX ORDER — CHOLESTYRAMINE LIGHT 4 G/5.7G
1 POWDER, FOR SUSPENSION ORAL EVERY 12 HOURS SCHEDULED
Status: DISCONTINUED | OUTPATIENT
Start: 2023-05-02 | End: 2023-05-04 | Stop reason: HOSPADM

## 2023-05-02 RX ORDER — DIPHENOXYLATE HYDROCHLORIDE AND ATROPINE SULFATE 2.5; .025 MG/1; MG/1
2 TABLET ORAL
Status: DISCONTINUED | OUTPATIENT
Start: 2023-05-02 | End: 2023-05-02

## 2023-05-02 RX ORDER — SODIUM CHLORIDE 0.9 % (FLUSH) 0.9 %
3-10 SYRINGE (ML) INJECTION AS NEEDED
Status: DISCONTINUED | OUTPATIENT
Start: 2023-05-02 | End: 2023-05-04

## 2023-05-02 RX ORDER — SODIUM, POTASSIUM,MAG SULFATES 17.5-3.13G
1 SOLUTION, RECONSTITUTED, ORAL ORAL ONCE
Status: COMPLETED | OUTPATIENT
Start: 2023-05-02 | End: 2023-05-02

## 2023-05-02 RX ORDER — DICYCLOMINE HYDROCHLORIDE 10 MG/1
10 CAPSULE ORAL 4 TIMES DAILY
Status: DISCONTINUED | OUTPATIENT
Start: 2023-05-02 | End: 2023-05-04 | Stop reason: HOSPADM

## 2023-05-02 RX ORDER — SORBITOL SOLUTION 70 %
50 SOLUTION, ORAL MISCELLANEOUS ONCE
Status: COMPLETED | OUTPATIENT
Start: 2023-05-03 | End: 2023-05-03

## 2023-05-02 RX ADMIN — CHOLESTYRAMINE 4 G: 4 POWDER, FOR SUSPENSION ORAL at 20:30

## 2023-05-02 RX ADMIN — POTASSIUM CHLORIDE 40 MEQ: 1500 TABLET, EXTENDED RELEASE ORAL at 04:08

## 2023-05-02 RX ADMIN — SODIUM CHLORIDE 100 ML/HR: 9 INJECTION, SOLUTION INTRAVENOUS at 14:48

## 2023-05-02 RX ADMIN — DIPHENOXYLATE HYDROCHLORIDE AND ATROPINE SULFATE 2 TABLET: 2.5; .025 TABLET ORAL at 12:09

## 2023-05-02 RX ADMIN — FERROUS SULFATE TAB EC 324 MG (65 MG FE EQUIVALENT) 324 MG: 324 (65 FE) TABLET DELAYED RESPONSE at 08:22

## 2023-05-02 RX ADMIN — POTASSIUM CHLORIDE 40 MEQ: 1500 TABLET, EXTENDED RELEASE ORAL at 12:08

## 2023-05-02 RX ADMIN — POTASSIUM CHLORIDE 40 MEQ: 1500 TABLET, EXTENDED RELEASE ORAL at 08:21

## 2023-05-02 RX ADMIN — DICYCLOMINE HYDROCHLORIDE 10 MG: 10 CAPSULE ORAL at 17:05

## 2023-05-02 RX ADMIN — PANTOPRAZOLE SODIUM 40 MG: 40 TABLET, DELAYED RELEASE ORAL at 06:34

## 2023-05-02 RX ADMIN — Medication 250 MG: at 20:30

## 2023-05-02 RX ADMIN — SODIUM CHLORIDE 100 ML/HR: 9 INJECTION, SOLUTION INTRAVENOUS at 06:35

## 2023-05-02 RX ADMIN — Medication 3 ML: at 20:29

## 2023-05-02 RX ADMIN — LEVOTHYROXINE SODIUM 50 MCG: 0.05 TABLET ORAL at 06:34

## 2023-05-02 RX ADMIN — Medication 250 MG: at 08:22

## 2023-05-02 RX ADMIN — CHOLESTYRAMINE 4 G: 4 POWDER, FOR SUSPENSION ORAL at 13:42

## 2023-05-02 RX ADMIN — DICYCLOMINE HYDROCHLORIDE 10 MG: 10 CAPSULE ORAL at 20:30

## 2023-05-02 RX ADMIN — SODIUM SULFATE, POTASSIUM SULFATE, MAGNESIUM SULFATE 1 BOTTLE: 1.6; 3.13; 17.5 SOLUTION ORAL at 16:10

## 2023-05-02 NOTE — CONSULTS
GI CONSULT  NOTE:    Referring Provider:  Dr. Richardson    Chief complaint: Diarrhea    Subjective .     History of present illness: Angy Davies is a 57 y.o. female with history of breast cancer, hysterectomy, appendectomy, and cholecystectomy who presents with complaints of diarrhea.  The patient reports that she had an acute onset of diarrhea 3 weeks ago.  States that she is having more than 10 bowel movements per day and states that diarrhea is waking her at night.  She denies recent antibiotic use.  No bright red blood per rectum or melena.  She states that she is having intermittent lower abdominal cramping with bowel movements, but this resolves following bowel movement.  Complains of nausea, but denies any vomiting.  No heartburn or dysphagia.  Takes Aleve on an as-needed basis, but denies daily NSAID use.  She reports recent low-grade fever along with a weight loss of approximately 30 pounds over the past 3 weeks.      Endo History:  Reports previous colonoscopy 10 years ago in [place] which showed colon polyps.    Past Medical History:  Past Medical History:   Diagnosis Date   • Allergic    • Arthritis    • Asthma    • Breast cancer    • Cataract    • Chronic diarrhea    • Headache    • Hx of radiation therapy    • Injury of back    • Osteoporosis        Past Surgical History:  Past Surgical History:   Procedure Laterality Date   • BREAST BIOPSY     • BREAST LUMPECTOMY     • HYSTERECTOMY         Social History:  Social History     Tobacco Use   • Smoking status: Former     Years: 2.00     Types: Cigarettes   • Smokeless tobacco: Never   Vaping Use   • Vaping Use: Never used   Substance Use Topics   • Alcohol use: Yes     Comment: rarely   • Drug use: Never       Family History:  Family History   Problem Relation Age of Onset   • Breast cancer Maternal Grandmother    • Ovarian cancer Mother        Medications:  Medications Prior to Admission   Medication Sig Dispense Refill Last Dose   • amitriptyline  (ELAVIL) 25 MG tablet Take 1 tablet by mouth Every Night.   4/30/2023   • Calcium Carb-Cholecalciferol (Os-Arthur Calcium + D3) 500-200 MG-UNIT tablet Take  by mouth.   5/1/2023   • cyclobenzaprine (FLEXERIL) 10 MG tablet TAKE ONE TABLET 2 TIMES A DAY AS NEEDED   5/1/2023   • dicyclomine (BENTYL) 20 MG tablet TAKE 1 TABLET BY ORAL ROUTE 2-3 TIMES EVERY DAY   5/1/2023   • diphenhydrAMINE-acetaminophen (TYLENOL PM)  MG tablet per tablet Take  by mouth.   4/30/2023   • K-Phos 500 MG tablet Take 1 tablet by mouth 2 (Two) Times a Day.   4/30/2023   • letrozole (FEMARA) 2.5 MG tablet TAKE 1 TABLET BY MOUTH EVERY DAY 90 tablet 2 5/1/2023   • levothyroxine (SYNTHROID, LEVOTHROID) 25 MCG tablet Take 2 tablets by mouth Daily.   5/1/2023   • lisinopril-hydrochlorothiazide (PRINZIDE,ZESTORETIC) 10-12.5 MG per tablet Take 1 tablet by mouth Daily.   5/1/2023   • naproxen (NAPROSYN) 500 MG tablet Take 1 tablet by mouth 2 (Two) Times a Day With Meals.   5/1/2023   • denosumab (Prolia) 60 MG/ML solution prefilled syringe syringe    More than a month   • ferrous sulfate 324 (65 Fe) MG tablet delayed-release EC tablet Take 324 mg by mouth Daily With Breakfast.      • traMADol (ULTRAM) 50 MG tablet Take 1 tablet by mouth Every 12 (Twelve) Hours As Needed for Moderate Pain.          Scheduled Meds:cholestyramine light, 1 packet, Oral, Q12H  ferrous sulfate, 324 mg, Oral, Daily With Breakfast  levothyroxine, 50 mcg, Oral, Daily  pantoprazole, 40 mg, Oral, Q AM  potassium chloride, 40 mEq, Oral, Daily  saccharomyces boulardii, 250 mg, Oral, BID  sodium chloride, 3 mL, Intravenous, Q12H      Continuous Infusions:sodium chloride, 100 mL/hr, Last Rate: 100 mL/hr (05/02/23 1324)      PRN Meds:.•  acetaminophen  •  hydrALAZINE  •  magnesium sulfate **OR** magnesium sulfate in D5W 1g/100mL (PREMIX)  •  ondansetron **OR** ondansetron  •  potassium chloride **OR** potassium chloride **OR** potassium chloride  •  potassium phosphate infusion  greater than 15 mMoles **OR** potassium phosphates **OR** potassium phosphate **OR** sodium phosphate IVPB **OR** sodium phosphate IVPB **OR** Sodium Phosphate  •  sodium chloride  •  sodium chloride  •  sodium chloride    ALLERGIES:  Aspirin, Penicillins, and Letrozole    ROS:  The following systems were reviewed;   Constitution:  Fevers, no chills, unintentional weight loss  Skin: no rash, no jaundice  Eyes:  No blurry vision, no eye pain  HENT:  No change in hearing or smell  Resp:  No dyspnea or cough  CV:  No chest pain or palpitations  :  No dysuria, hematuria  Musculoskeletal:  No leg cramps or arthralgias  Neuro:  No tremor, no numbness  Psych:  No depression or confusion    Objective     Vital Signs:   Vitals:    05/02/23 0052 05/02/23 0355 05/02/23 0833 05/02/23 1157   BP: 114/75 124/72 92/62 105/75   BP Location: Left leg Left arm Left arm Left arm   Patient Position: Lying Sitting Lying Lying   Pulse: 81 76 85 83   Resp: 17 18 18 19   Temp: 97.6 °F (36.4 °C) 97.5 °F (36.4 °C) 97.4 °F (36.3 °C) 98.6 °F (37 °C)   TempSrc: Oral Oral Oral Oral   SpO2: 93% 98% 100% 99%   Weight:       Height:           Physical Exam:       General Appearance:    Awake and alert, in no acute distress   Head:    Normocephalic, without obvious abnormality, atraumatic   Throat:   No oral lesions, no thrush, oral mucosa moist   Lungs:     Respirations regular, even and unlabored   Chest Wall:    No abnormalities observed   Abdomen:     Soft, non-tender, no rebound or guarding, non-distended   Rectal:     Deferred   Extremities:   Moves all extremities, no edema, no cyanosis   Pulses:   Pulses palpable and equal bilaterally   Skin:   No rash, no jaundice, normal palpation   Lymph nodes:   No cervical, supraclavicular or submandibular palpable adenopathy   Neurologic:   Cranial nerves 2 - 12 grossly intact, no asterixis       Results Review:   I reviewed the patient's labs and imaging.  CBC    Results from last 7 days   Lab Units  05/01/23  2345 05/01/23  1150   WBC 10*3/mm3 9.60 6.60   HEMOGLOBIN g/dL 13.8 13.9   PLATELETS 10*3/mm3 239 287     CMP   Results from last 7 days   Lab Units 05/01/23  2345 05/01/23  1150   SODIUM mmol/L 141 139   POTASSIUM mmol/L 2.6* 2.6*   CHLORIDE mmol/L 107 103   CO2 mmol/L 24.0 25.0   BUN mg/dL 23* 28*   CREATININE mg/dL 1.14* 1.26*   GLUCOSE mg/dL 104* 104*   ALBUMIN g/dL  --  4.6   BILIRUBIN mg/dL  --  0.3   ALK PHOS U/L  --  83   AST (SGOT) U/L  --  20   ALT (SGPT) U/L  --  24   MAGNESIUM mg/dL  --  2.1   LIPASE U/L  --  44     Cr Clearance Estimated Creatinine Clearance: 54.5 mL/min (A) (by C-G formula based on SCr of 1.14 mg/dL (H)).  Coag     HbA1C   Lab Results   Component Value Date    HGBA1C 5.1 07/20/2022    HGBA1C 5.2 12/15/2021     Blood Glucose No results found for: POCGLU  Infection     UA    Results from last 7 days   Lab Units 05/01/23  1235   NITRITE UA  Negative   WBC UA /HPF 0-2*   BACTERIA UA /HPF None Seen   SQUAM EPITHEL UA /HPF 0-2     Radiology(recent) CT Abdomen Pelvis With Contrast    Result Date: 5/1/2023  Impression: 1. No acute abnormality in the abdomen or pelvis. 2. Dilatation of the common bile duct measuring 11 mm may relate to cholecystectomy, correlate with LFTs to exclude obstructive LFT pattern. 3. Prior cholecystectomy, hysterectomy, and appendectomy. 4. Additional chronic findings above. Electronically Signed: Tera Hernandez  5/1/2023 2:04 PM EDT  Workstation ID: [id number]         ASSESSMENT:  -Diarrhea  -Lower abdominal pain  -Nausea  -Unintentional weight loss  -Hypokalemia  -ELEUTERIO  -History of breast cancer  -History of hysterectomy  -History of appendectomy  -History of cholecystectomy    PLAN:  Patient is a pleasant 57-year-old female with history of breast cancer, hysterectomy, cholecystectomy, and appendectomy who presents with complaints of acute kidney injury and diarrhea.    GI PCR negative.  C. difficile pending.  However, outpatient C. difficile testing at her  primary care provider's office was reportedly negative last week.  CT abdomen/pelvis does not show any acute abnormality.  The patient would benefit from colonoscopy for further evaluation of her diarrhea.  However, potassium will need to be corrected prior to proceeding with endoscopy.  Potassium replacement protocol has been ordered.  Agree with cholestyramine twice daily and Florastor.  Clear liquid diet in preparation for colonoscopy in the near future.  Add dicyclomine to help with lower abdominal pain and urgency.  Antiemetics as needed.  Supportive care.      I discussed the patients findings and my recommendations with the patient.  I will discuss the case with Dr. Tapia and change the plan accordingly.    We appreciate the referral.    Electronically signed by CALEB Ching, 05/02/23, 2:09 PM EDT.

## 2023-05-02 NOTE — PLAN OF CARE
Problem: Adult Inpatient Plan of Care  Goal: Plan of Care Review  Outcome: Ongoing, Progressing  Flowsheets (Taken 5/2/2023 1145)  Progress: no change  Outcome Evaluation: Continues to have mutiple episodes of diarrhea.  Electrolytes are being replaced  Goal: Patient-Specific Goal (Individualized)  Outcome: Ongoing, Progressing  Goal: Absence of Hospital-Acquired Illness or Injury  Outcome: Ongoing, Progressing  Intervention: Identify and Manage Fall Risk  Recent Flowsheet Documentation  Taken 5/2/2023 1000 by Oscar Kincaid RN  Safety Promotion/Fall Prevention: safety round/check completed  Taken 5/2/2023 0815 by Oscar Kincaid RN  Safety Promotion/Fall Prevention: safety round/check completed  Intervention: Prevent Skin Injury  Recent Flowsheet Documentation  Taken 5/2/2023 0815 by Oscar Kincaid RN  Skin Protection: adhesive use limited  Goal: Optimal Comfort and Wellbeing  Outcome: Ongoing, Progressing  Goal: Readiness for Transition of Care  Outcome: Ongoing, Progressing   Goal Outcome Evaluation:  Plan of Care Reviewed With: patient        Progress: no change  Outcome Evaluation: Continues to have mutiple episodes of diarrhea.  Electrolytes are being replaced

## 2023-05-02 NOTE — PLAN OF CARE
Goal Outcome Evaluation:   Patient alert and oriented, independent in ADL's. Complaints of diarrhea throughout the shift. Patient educated on importance of slowing down to the bathroom so that she does not fall. Potassium currently being replaced

## 2023-05-02 NOTE — CASE MANAGEMENT/SOCIAL WORK
Discharge Planning Assessment  [facility]     Patient Name: Angy Davies  MRN: [id number]  Today's Date: 5/2/2023    Admit Date: 5/1/2023    Plan: D/C plan: Anticipate home with spouse.   Discharge Needs Assessment     Row Name 05/02/23 1545       Living Environment    People in Home spouse    Name(s) of People in Home  Speedy    Current Living Arrangements home    Primary Care Provided by self    Provides Primary Care For no one    Family Caregiver if Needed spouse    Family Caregiver Names Speedy    Quality of Family Relationships unable to assess    Able to Return to Prior Arrangements yes       Resource/Environmental Concerns    Resource/Environmental Concerns none    Transportation Concerns none       Transition Planning    Patient/Family Anticipates Transition to home with family    Patient/Family Anticipated Services at Transition none    Transportation Anticipated family or friend will provide       Discharge Needs Assessment    Readmission Within the Last 30 Days no previous admission in last 30 days    Equipment Currently Used at Home none    Concerns to be Addressed no discharge needs identified;denies needs/concerns at this time    Anticipated Changes Related to Illness none    Equipment Needed After Discharge none    Provided Post Acute Provider List? N/A    N/A Provider List Comment anticipate home               Discharge Plan     Row Name 05/02/23 1546       Plan    Plan D/C plan: Anticipate home with spouse.    Patient/Family in Agreement with Plan yes    Plan Comments CM met with patient at bedside. Pt lives at home with  Speedy. Pt is IADL, including driving. Speedy to transport at discharge. Denies use of DME at home. PCP and pharmacy confirmed. No financial barriers to meds. No current use of HHC. No d/c needs identified at this time. CM will follow for needs.               Demographic Summary     Row Name 05/02/23 1545       General Information    Admission Type observation    Arrived From  emergency department    Referral Source admission list    Reason for Consult discharge planning    Preferred Language English               Functional Status     Row Name 05/02/23 1545       Functional Status    Usual Activity Tolerance good    Current Activity Tolerance good       Functional Status, IADL    Medications independent    Meal Preparation independent    Housekeeping independent    Laundry independent    Shopping independent                   Met with patient in room   Maintained distance greater than six feet and spent less than 15 minutes in the room.            Carlos Trevino RN

## 2023-05-02 NOTE — CONSULTS
visited with patient at bedside.    Patient reports that she has been sick for 3 weeks and believes that her ailments is being caused by a virus. Patient mainly focused on her father who is ill and how he is viewing his current circumstances. She reports he desires to die due to his quality of life. Discussed possible conversation with her father about how best to care and support him. Patient requested spiritual support by prayer. Prayed over the topics discussed.    General follow up.    Chaplain Shyam Sommer

## 2023-05-02 NOTE — PROGRESS NOTES
LOS: 0 days   Patient Care Team:  Germán Spencer FNP as PCP - General  Juliette Harris MD as Consulting Physician (Hematology and Oncology)    Subjective     Interval History: No significant change,    Patient Complaints: Continues to have frequent loose yellow stools, no blood visualized, minimal abdominal cramping    History taken from: patient    Review of Systems   Constitutional: Positive for activity change and fatigue. Negative for appetite change, chills, diaphoresis and fever.   HENT: Negative for facial swelling.    Eyes: Negative for visual disturbance.   Respiratory: Negative for cough and shortness of breath.    Cardiovascular: Negative for chest pain.   Gastrointestinal: Positive for abdominal pain and diarrhea. Negative for nausea and vomiting.   Endocrine: Negative for polyuria.   Genitourinary: Negative for dysuria.   Musculoskeletal: Negative for arthralgias.   Skin: Negative for rash and wound.   Allergic/Immunologic: Negative for immunocompromised state.   Neurological: Negative for dizziness and headaches.   Psychiatric/Behavioral: Negative for confusion.           Objective     Vital Signs  Temp:  [97.2 °F (36.2 °C)-98.6 °F (37 °C)] 98.6 °F (37 °C)  Heart Rate:  [76-88] 83  Resp:  [16-19] 19  BP: ()/() 105/75    Physical Exam:     General Appearance:    Alert, cooperative, in no acute distress,   Head:    Normocephalic, without obvious abnormality, atraumatic   Eyes:            Lids and lashes normal, conjunctivae and sclerae normal, no   icterus, no pallor, corneas clear, PERRLA   Ears:    Ears appear intact with no abnormalities noted   Throat:   No oral lesions, no thrush, oral mucosa moist   Neck:   No adenopathy, supple, trachea midline, no thyromegaly, no   carotid bruit, no JVD   Lungs:     Clear to auscultation,respirations regular, even and                  unlabored    Heart:    Regular rhythm and normal rate, normal S1 and S2, no            murmur, no  gallop, no rub, no click   Chest Wall:    No abnormalities observed   Abdomen:    Mild diffuse abdominal tenderness, no rebound or guarding, hyperactive bowel sounds,   Extremities:   Moves all extremities well, no edema, no cyanosis, no             Redness   Pulses:   Pulses palpable and equal bilaterally   Skin:   No bleeding, bruising or rash   Lymph nodes:   No palpable adenopathy   Neurologic:   Cranial nerves 2 - 12 grossly intact, sensation intact, DTR       present and equal bilaterally        Results Review:    Lab Results (last 24 hours)     Procedure Component Value Units Date/Time    Clostridioides difficile Toxin - Stool, Per Rectum [[id number]] Collected: 05/02/23 1356    Specimen: Stool from Per Rectum Updated: 05/02/23 1404    Narrative:      The following orders were created for panel order Clostridioides difficile Toxin - Stool, Per Rectum.  Procedure                               Abnormality         Status                     ---------                               -----------         ------                     Clostridioides difficile...[[id number]]                      In process                   Please view results for these tests on the individual orders.    Clostridioides difficile EIA - Stool, Per Rectum [[id number]] Collected: 05/02/23 1356    Specimen: Stool from Per Rectum Updated: 05/02/23 1404    TSH [[id number]]  (Abnormal) Collected: 05/01/23 2345    Specimen: Blood from Arm, Left Updated: 05/02/23 1312     TSH 7.510 uIU/mL     T4, Free [[id number]]  (Normal) Collected: 05/01/23 2345    Specimen: Blood from Arm, Left Updated: 05/02/23 1312     Free T4 1.16 ng/dL     Narrative:      Results may be falsely increased if patient taking Biotin.      Basic Metabolic Panel [[id number]]  (Abnormal) Collected: 05/01/23 2345    Specimen: Blood from Arm, Left Updated: 05/02/23 0110     Glucose 104 mg/dL      BUN 23 mg/dL      Creatinine 1.14 mg/dL      Sodium 141 mmol/L      Potassium 2.6 mmol/L       Chloride 107 mmol/L      CO2 24.0 mmol/L      Calcium 9.2 mg/dL      BUN/Creatinine Ratio 20.2     Anion Gap 10.0 mmol/L      eGFR 56.3 mL/min/1.73     Narrative:      GFR Normal >60  Chronic Kidney Disease <60  Kidney Failure <15      CBC & Differential [[id number]]  (Abnormal) Collected: 05/01/23 2345    Specimen: Blood from Arm, Left Updated: 05/02/23 0047    Narrative:      The following orders were created for panel order CBC & Differential.  Procedure                               Abnormality         Status                     ---------                               -----------         ------                     CBC Auto Differential[[id number]]        Abnormal            Final result                 Please view results for these tests on the individual orders.    CBC Auto Differential [[id number]]  (Abnormal) Collected: 05/01/23 2345    Specimen: Blood from Arm, Left Updated: 05/02/23 0047     WBC 9.60 10*3/mm3      RBC 4.26 10*6/mm3      Hemoglobin 13.8 g/dL      Hematocrit 38.3 %      MCV 89.8 fL      MCH 32.4 pg      MCHC 36.1 g/dL      RDW 12.7 %      RDW-SD 42.4 fl      MPV 8.4 fL      Platelets 239 10*3/mm3      Neutrophil % 61.5 %      Lymphocyte % 26.6 %      Monocyte % 10.2 %      Eosinophil % 1.1 %      Basophil % 0.6 %      Neutrophils, Absolute 5.90 10*3/mm3      Lymphocytes, Absolute 2.60 10*3/mm3      Monocytes, Absolute 1.00 10*3/mm3      Eosinophils, Absolute 0.10 10*3/mm3      Basophils, Absolute 0.10 10*3/mm3      nRBC 0.3 /100 WBC            Imaging Results (Last 24 Hours)     ** No results found for the last 24 hours. **               I reviewed the patient's new clinical results.    Medication Review:   Scheduled Meds:cholestyramine light, 1 packet, Oral, Q12H  dicyclomine, 10 mg, Oral, 4x Daily  ferrous sulfate, 324 mg, Oral, Daily With Breakfast  levothyroxine, 50 mcg, Oral, Daily  pantoprazole, 40 mg, Oral, Q AM  potassium chloride, 40 mEq, Oral, Daily  saccharomyces boulardii, 250 mg,  Oral, BID  sodium chloride, 3 mL, Intravenous, Q12H  sodium chloride, 3 mL, Intravenous, Q12H  [START ON 5/3/2023] sorbitol, 50 mL, Oral, Once   Followed by  [START ON 5/3/2023] sorbitol, 50 mL, Oral, Once      Continuous Infusions:sodium chloride, 100 mL/hr, Last Rate: 100 mL/hr (05/02/23 1651)      PRN Meds:.•  acetaminophen  •  hydrALAZINE  •  magnesium sulfate **OR** magnesium sulfate in D5W 1g/100mL (PREMIX)  •  ondansetron **OR** ondansetron  •  potassium chloride **OR** potassium chloride **OR** potassium chloride  •  potassium phosphate infusion greater than 15 mMoles **OR** potassium phosphates **OR** potassium phosphate **OR** sodium phosphate IVPB **OR** sodium phosphate IVPB **OR** Sodium Phosphate  •  sodium chloride  •  sodium chloride  •  sodium chloride  •  sodium chloride  •  sodium chloride     Assessment & Plan       Diarrhea, unspecified type  - recent outpatient test for c diff was negative; adding cholestyramine and Bentyl; GI plans colonoscopy    Hypokalemia - replacing; repeat level this afternoon  Hypothyroidism - free t4 is normal; continue current dose          Plan for disposition:Home at discharge    Randa Richardson MD  05/02/23  16:59 EDT

## 2023-05-03 ENCOUNTER — ANESTHESIA EVENT (OUTPATIENT)
Dept: GASTROENTEROLOGY | Facility: HOSPITAL | Age: 58
End: 2023-05-03
Payer: COMMERCIAL

## 2023-05-03 ENCOUNTER — ANESTHESIA (OUTPATIENT)
Dept: GASTROENTEROLOGY | Facility: HOSPITAL | Age: 58
End: 2023-05-03
Payer: COMMERCIAL

## 2023-05-03 ENCOUNTER — INPATIENT HOSPITAL (OUTPATIENT)
Dept: URBAN - METROPOLITAN AREA HOSPITAL 84 | Facility: HOSPITAL | Age: 58
End: 2023-05-03

## 2023-05-03 DIAGNOSIS — R19.7 DIARRHEA, UNSPECIFIED: ICD-10-CM

## 2023-05-03 LAB
ALBUMIN SERPL-MCNC: 3.8 G/DL (ref 3.5–5.2)
ALBUMIN/GLOB SERPL: 1.5 G/DL
ALP SERPL-CCNC: 68 U/L (ref 39–117)
ALT SERPL W P-5'-P-CCNC: 16 U/L (ref 1–33)
ANION GAP SERPL CALCULATED.3IONS-SCNC: 9 MMOL/L (ref 5–15)
AST SERPL-CCNC: 20 U/L (ref 1–32)
BASOPHILS # BLD AUTO: 0 10*3/MM3 (ref 0–0.2)
BASOPHILS NFR BLD AUTO: 0.4 % (ref 0–1.5)
BILIRUB SERPL-MCNC: 0.2 MG/DL (ref 0–1.2)
BUN SERPL-MCNC: 15 MG/DL (ref 6–20)
BUN/CREAT SERPL: 17 (ref 7–25)
C DIFF GDH + TOXINS A+B STL QL IA.RAPID: NEGATIVE
C DIFF GDH + TOXINS A+B STL QL IA.RAPID: NEGATIVE
CALCIUM SPEC-SCNC: 8.4 MG/DL (ref 8.6–10.5)
CHLORIDE SERPL-SCNC: 114 MMOL/L (ref 98–107)
CO2 SERPL-SCNC: 21 MMOL/L (ref 22–29)
CREAT SERPL-MCNC: 0.88 MG/DL (ref 0.57–1)
DEPRECATED RDW RBC AUTO: 43.3 FL (ref 37–54)
EGFRCR SERPLBLD CKD-EPI 2021: 76.8 ML/MIN/1.73
EOSINOPHIL # BLD AUTO: 0.2 10*3/MM3 (ref 0–0.4)
EOSINOPHIL NFR BLD AUTO: 2.4 % (ref 0.3–6.2)
ERYTHROCYTE [DISTWIDTH] IN BLOOD BY AUTOMATED COUNT: 12.9 % (ref 12.3–15.4)
GLOBULIN UR ELPH-MCNC: 2.6 GM/DL
GLUCOSE SERPL-MCNC: 95 MG/DL (ref 65–99)
HCT VFR BLD AUTO: 34 % (ref 34–46.6)
HGB BLD-MCNC: 12 G/DL (ref 12–15.9)
LYMPHOCYTES # BLD AUTO: 2.1 10*3/MM3 (ref 0.7–3.1)
LYMPHOCYTES NFR BLD AUTO: 29.2 % (ref 19.6–45.3)
MCH RBC QN AUTO: 31.9 PG (ref 26.6–33)
MCHC RBC AUTO-ENTMCNC: 35.4 G/DL (ref 31.5–35.7)
MCV RBC AUTO: 90.3 FL (ref 79–97)
MONOCYTES # BLD AUTO: 0.6 10*3/MM3 (ref 0.1–0.9)
MONOCYTES NFR BLD AUTO: 8.5 % (ref 5–12)
NEUTROPHILS NFR BLD AUTO: 4.3 10*3/MM3 (ref 1.7–7)
NEUTROPHILS NFR BLD AUTO: 59.5 % (ref 42.7–76)
NRBC BLD AUTO-RTO: 0.3 /100 WBC (ref 0–0.2)
PLATELET # BLD AUTO: 188 10*3/MM3 (ref 140–450)
PMV BLD AUTO: 8.4 FL (ref 6–12)
POTASSIUM SERPL-SCNC: 3.5 MMOL/L (ref 3.5–5.2)
PROT SERPL-MCNC: 6.4 G/DL (ref 6–8.5)
RBC # BLD AUTO: 3.76 10*6/MM3 (ref 3.77–5.28)
SODIUM SERPL-SCNC: 144 MMOL/L (ref 136–145)
WBC NRBC COR # BLD: 7.2 10*3/MM3 (ref 3.4–10.8)

## 2023-05-03 PROCEDURE — G0378 HOSPITAL OBSERVATION PER HR: HCPCS

## 2023-05-03 PROCEDURE — 80053 COMPREHEN METABOLIC PANEL: CPT | Performed by: NURSE PRACTITIONER

## 2023-05-03 PROCEDURE — 85025 COMPLETE CBC W/AUTO DIFF WBC: CPT | Performed by: NURSE PRACTITIONER

## 2023-05-03 PROCEDURE — 25010000002 PROPOFOL 200 MG/20ML EMULSION: Performed by: NURSE ANESTHETIST, CERTIFIED REGISTERED

## 2023-05-03 PROCEDURE — 45380 COLONOSCOPY AND BIOPSY: CPT | Performed by: INTERNAL MEDICINE

## 2023-05-03 PROCEDURE — 88305 TISSUE EXAM BY PATHOLOGIST: CPT | Performed by: INTERNAL MEDICINE

## 2023-05-03 RX ORDER — SODIUM CHLORIDE 9 MG/ML
INJECTION, SOLUTION INTRAVENOUS CONTINUOUS PRN
Status: DISCONTINUED | OUTPATIENT
Start: 2023-05-03 | End: 2023-05-03 | Stop reason: SURG

## 2023-05-03 RX ORDER — ONDANSETRON 2 MG/ML
4 INJECTION INTRAMUSCULAR; INTRAVENOUS EVERY 6 HOURS PRN
Status: DISCONTINUED | OUTPATIENT
Start: 2023-05-03 | End: 2023-05-03 | Stop reason: SDUPTHER

## 2023-05-03 RX ORDER — ONDANSETRON 4 MG/1
4 TABLET, FILM COATED ORAL EVERY 6 HOURS PRN
Status: DISCONTINUED | OUTPATIENT
Start: 2023-05-03 | End: 2023-05-03 | Stop reason: SDUPTHER

## 2023-05-03 RX ORDER — LIDOCAINE HYDROCHLORIDE 20 MG/ML
INJECTION, SOLUTION EPIDURAL; INFILTRATION; INTRACAUDAL; PERINEURAL AS NEEDED
Status: DISCONTINUED | OUTPATIENT
Start: 2023-05-03 | End: 2023-05-03 | Stop reason: SURG

## 2023-05-03 RX ORDER — PROPOFOL 10 MG/ML
INJECTION, EMULSION INTRAVENOUS AS NEEDED
Status: DISCONTINUED | OUTPATIENT
Start: 2023-05-03 | End: 2023-05-03 | Stop reason: SURG

## 2023-05-03 RX ADMIN — DICYCLOMINE HYDROCHLORIDE 10 MG: 10 CAPSULE ORAL at 17:01

## 2023-05-03 RX ADMIN — DICYCLOMINE HYDROCHLORIDE 10 MG: 10 CAPSULE ORAL at 20:05

## 2023-05-03 RX ADMIN — PROPOFOL 20 MG: 10 INJECTION, EMULSION INTRAVENOUS at 13:54

## 2023-05-03 RX ADMIN — Medication 250 MG: at 20:05

## 2023-05-03 RX ADMIN — PROPOFOL 100 MG: 10 INJECTION, EMULSION INTRAVENOUS at 13:45

## 2023-05-03 RX ADMIN — PANTOPRAZOLE SODIUM 40 MG: 40 TABLET, DELAYED RELEASE ORAL at 05:59

## 2023-05-03 RX ADMIN — PROPOFOL 25 MG: 10 INJECTION, EMULSION INTRAVENOUS at 14:00

## 2023-05-03 RX ADMIN — PROPOFOL 10 MG: 10 INJECTION, EMULSION INTRAVENOUS at 14:06

## 2023-05-03 RX ADMIN — SODIUM CHLORIDE: 0.9 INJECTION, SOLUTION INTRAVENOUS at 13:40

## 2023-05-03 RX ADMIN — PROPOFOL 40 MG: 10 INJECTION, EMULSION INTRAVENOUS at 14:03

## 2023-05-03 RX ADMIN — PROPOFOL 30 MG: 10 INJECTION, EMULSION INTRAVENOUS at 14:04

## 2023-05-03 RX ADMIN — Medication 3 ML: at 20:07

## 2023-05-03 RX ADMIN — LIDOCAINE HYDROCHLORIDE 100 MG: 20 INJECTION, SOLUTION EPIDURAL; INFILTRATION; INTRACAUDAL; PERINEURAL at 13:45

## 2023-05-03 RX ADMIN — SODIUM CHLORIDE 100 ML/HR: 9 INJECTION, SOLUTION INTRAVENOUS at 12:49

## 2023-05-03 RX ADMIN — POTASSIUM CHLORIDE 40 MEQ: 1500 TABLET, EXTENDED RELEASE ORAL at 08:57

## 2023-05-03 RX ADMIN — PROPOFOL 50 MG: 10 INJECTION, EMULSION INTRAVENOUS at 13:51

## 2023-05-03 RX ADMIN — SORBITOL SOLUTION (BULK) 30 ML: 70 SOLUTION at 04:48

## 2023-05-03 RX ADMIN — CHOLESTYRAMINE 4 G: 4 POWDER, FOR SUSPENSION ORAL at 20:05

## 2023-05-03 RX ADMIN — PROPOFOL 30 MG: 10 INJECTION, EMULSION INTRAVENOUS at 13:57

## 2023-05-03 RX ADMIN — PROPOFOL 50 MG: 10 INJECTION, EMULSION INTRAVENOUS at 13:48

## 2023-05-03 RX ADMIN — LIDOCAINE HYDROCHLORIDE 50 MG: 20 INJECTION, SOLUTION EPIDURAL; INFILTRATION; INTRACAUDAL; PERINEURAL at 13:47

## 2023-05-03 RX ADMIN — Medication 250 MG: at 08:57

## 2023-05-03 RX ADMIN — Medication 3 ML: at 08:57

## 2023-05-03 RX ADMIN — PROPOFOL 20 MG: 10 INJECTION, EMULSION INTRAVENOUS at 14:01

## 2023-05-03 RX ADMIN — LEVOTHYROXINE SODIUM 50 MCG: 0.05 TABLET ORAL at 05:59

## 2023-05-03 NOTE — ANESTHESIA POSTPROCEDURE EVALUATION
Patient: Angy Davies    Procedure Summary     Date: 05/03/23 Room / Location: [facility] ENDOSCOPY 1 / [facility] ENDOSCOPY    Anesthesia Start: 1340 Anesthesia Stop: 1411    Procedure: COLONOSCOPY with random colon biopsy's Diagnosis:       Diarrhea, unspecified type      (Diarrhea, unspecified type [R19.7])    Surgeons: Mitesh Tapia MD Provider: Beba Crowell MD    Anesthesia Type: general, MAC ASA Status: 3          Anesthesia Type: general, MAC    Vitals  Vitals Value Taken Time   /58 05/03/23 1425   Temp     Pulse 81 05/03/23 1428   Resp 10 05/03/23 1425   SpO2 80 % 05/03/23 1428   Vitals shown include unvalidated device data.        Post Anesthesia Care and Evaluation    Patient location during evaluation: PACU  Patient participation: complete - patient participated  Level of consciousness: awake and alert  Pain management: satisfactory to patient    Airway patency: patent  Anesthetic complications: No anesthetic complications  PONV Status: none  Cardiovascular status: acceptable  Respiratory status: acceptable  Hydration status: acceptable

## 2023-05-03 NOTE — PLAN OF CARE
Goal Outcome Evaluation:               Patient went for colonoscopy today. Sent biopsies. D/C from GI standpoint. Fluids discontinued. Diet restarted.

## 2023-05-03 NOTE — ANESTHESIA PREPROCEDURE EVALUATION
Anesthesia Evaluation     Patient summary reviewed and Nursing notes reviewed   no history of anesthetic complications:  NPO Solid Status: > 8 hours  NPO Liquid Status: > 8 hours           Airway   Mallampati: II  TM distance: >3 FB  Neck ROM: full  Dental    (+) upper dentures    Pulmonary - normal exam   (+) a smoker Former, COPD, asthma,  Cardiovascular - normal exam    ECG reviewed    (-) past MI, angina      Neuro/Psych  (+) headaches, psychiatric history Depression,    GI/Hepatic/Renal/Endo - negative ROS     Musculoskeletal     Abdominal    Substance History      OB/GYN          Other   arthritis,    history of cancer remission                    Anesthesia Plan    ASA 3     general and MAC     intravenous induction     Anesthetic plan, risks, benefits, and alternatives have been provided, discussed and informed consent has been obtained with: patient.    Plan discussed with CRNA.        CODE STATUS:    Level Of Support Discussed With: Patient  Code Status (Patient has no pulse and is not breathing): CPR (Attempt to Resuscitate)  Medical Interventions (Patient has pulse or is breathing): Full Support

## 2023-05-03 NOTE — PROGRESS NOTES
LOS: 0 days   Patient Care Team:  Germán Spencer FNP as PCP - General  Juliette Harris MD as Consulting Physician (Hematology and Oncology)    Subjective:  Pt seen and examined this morning.  No family present.   Pt reports she is waiting for her colonoscopy.   No new complaints today.       Review of Systems:   Review of Systems   Constitutional: Positive for fatigue. Negative for activity change.   HENT: Negative for trouble swallowing.    Eyes: Negative for visual disturbance.   Respiratory: Negative.    Cardiovascular: Negative.    Gastrointestinal: Positive for abdominal pain and diarrhea. Negative for blood in stool, nausea and vomiting.   Endocrine: Negative.    Genitourinary: Negative for dysuria.   Musculoskeletal: Negative.    Skin: Negative.    Neurological: Positive for weakness.   Hematological: Negative.    Psychiatric/Behavioral: Negative.            Vital Signs  Temp:  [97.4 °F (36.3 °C)-98.7 °F (37.1 °C)] 97.8 °F (36.6 °C)  Heart Rate:  [74-98] 84  Resp:  [10-21] 12  BP: ()/(58-94) 110/94    Physical Exam:  Physical Exam  Constitutional:       General: She is not in acute distress.  HENT:      Head: Normocephalic and atraumatic.      Nose: Nose normal.      Mouth/Throat:      Mouth: Mucous membranes are moist.      Pharynx: Oropharynx is clear.   Eyes:      General: Scleral icterus present.      Conjunctiva/sclera: Conjunctivae normal.   Cardiovascular:      Rate and Rhythm: Normal rate and regular rhythm.      Pulses: Normal pulses.      Heart sounds: Normal heart sounds.   Pulmonary:      Effort: Pulmonary effort is normal. No respiratory distress.      Breath sounds: Normal breath sounds. No wheezing, rhonchi or rales.   Abdominal:      General: Bowel sounds are increased. There is no distension.      Palpations: Abdomen is soft.      Tenderness: There is generalized abdominal tenderness.      Comments: ABD tenderness with light and deep palpation     Musculoskeletal:          General: Normal range of motion.      Cervical back: Normal range of motion and neck supple.      Right lower leg: No edema.      Left lower leg: No edema.   Skin:     General: Skin is warm and dry.      Capillary Refill: Capillary refill takes less than 2 seconds.   Neurological:      Mental Status: She is alert and oriented to person, place, and time.   Psychiatric:         Mood and Affect: Mood normal.         Behavior: Behavior normal.          Radiology:  CT Abdomen Pelvis With Contrast    Result Date: 5/1/2023  Impression: 1. No acute abnormality in the abdomen or pelvis. 2. Dilatation of the common bile duct measuring 11 mm may relate to cholecystectomy, correlate with LFTs to exclude obstructive LFT pattern. 3. Prior cholecystectomy, hysterectomy, and appendectomy. 4. Additional chronic findings above. Electronically Signed: Tera Hernandez  5/1/2023 2:04 PM EDT  Workstation ID: [id number]         Results Review:     I reviewed the patient's new clinical results.  I reviewed the patient's new imaging results and agree with the interpretation.    Medication Review:   Scheduled Meds:cholestyramine light, 1 packet, Oral, Q12H  dicyclomine, 10 mg, Oral, 4x Daily  ferrous sulfate, 324 mg, Oral, Daily With Breakfast  levothyroxine, 50 mcg, Oral, Daily  pantoprazole, 40 mg, Oral, Q AM  potassium chloride, 40 mEq, Oral, Daily  saccharomyces boulardii, 250 mg, Oral, BID  sodium chloride, 3 mL, Intravenous, Q12H  sodium chloride, 3 mL, Intravenous, Q12H  sorbitol, 50 mL, Oral, Once      Continuous Infusions:sodium chloride, 100 mL/hr, Last Rate: 100 mL/hr (05/03/23 1249)      PRN Meds:.•  acetaminophen  •  hydrALAZINE  •  magnesium sulfate **OR** magnesium sulfate in D5W 1g/100mL (PREMIX)  •  ondansetron **OR** ondansetron  •  potassium chloride **OR** potassium chloride **OR** potassium chloride  •  potassium phosphate infusion greater than 15 mMoles **OR** potassium phosphates **OR** potassium phosphate **OR** sodium  phosphate IVPB **OR** sodium phosphate IVPB **OR** Sodium Phosphate  •  sodium chloride  •  sodium chloride  •  sodium chloride  •  sodium chloride  •  sodium chloride    Labs:    CBC    Results from last 7 days   Lab Units 05/03/23 0510 05/01/23 2345 05/01/23  1150   WBC 10*3/mm3 7.20 9.60 6.60   HEMOGLOBIN g/dL 12.0 13.8 13.9   PLATELETS 10*3/mm3 188 239 287     BMP   Results from last 7 days   Lab Units 05/03/23 0510 05/02/23 1711 05/01/23 2345 05/01/23  1150   SODIUM mmol/L 144  --  141 139   POTASSIUM mmol/L 3.5 3.6 2.6* 2.6*   CHLORIDE mmol/L 114*  --  107 103   CO2 mmol/L 21.0*  --  24.0 25.0   BUN mg/dL 15  --  23* 28*   CREATININE mg/dL 0.88  --  1.14* 1.26*   GLUCOSE mg/dL 95  --  104* 104*   MAGNESIUM mg/dL  --   --   --  2.1     Cr Clearance Estimated Creatinine Clearance: 71.9 mL/min (by C-G formula based on SCr of 0.88 mg/dL).  Coag     HbA1C   Lab Results   Component Value Date    HGBA1C 5.1 07/20/2022    HGBA1C 5.2 12/15/2021     Blood Glucose No results found for: POCGLU  Infection     CMP   Results from last 7 days   Lab Units 05/03/23 0510 05/02/23 1711 05/01/23 2345 05/01/23  1150   SODIUM mmol/L 144  --  141 139   POTASSIUM mmol/L 3.5 3.6 2.6* 2.6*   CHLORIDE mmol/L 114*  --  107 103   CO2 mmol/L 21.0*  --  24.0 25.0   BUN mg/dL 15  --  23* 28*   CREATININE mg/dL 0.88  --  1.14* 1.26*   GLUCOSE mg/dL 95  --  104* 104*   ALBUMIN g/dL 3.8  --   --  4.6   BILIRUBIN mg/dL 0.2  --   --  0.3   ALK PHOS U/L 68  --   --  83   AST (SGOT) U/L 20  --   --  20   ALT (SGPT) U/L 16  --   --  24   LIPASE U/L  --   --   --  44     UA    Results from last 7 days   Lab Units 05/01/23  1235   NITRITE UA  Negative   WBC UA /HPF 0-2*   BACTERIA UA /HPF None Seen   SQUAM EPITHEL UA /HPF 0-2     Radiology(recent) No radiology results for the last day   Assessment/Plan:    Unintentional Weight Loss:   -Pt reports a 30lb weight loss in last 3 weeks    History of Breast Cancer s/p radiation therapy    Diarrhea,  unspecified type:   -Colonoscopy today    Hypokalemia:   -Replace as needed    Hypothyroidism:   -Stable- no changes     GI Prophylaxis:  PPI  DVT Prophylaxis:  SCDs    DC PLAN:  Routine to home.  Anticipate possible discharge to home on 5/4/2023 pending outcome of colonoscopy. Pt is agreeable to DC on 5/4/2023.         Brooke Vincent, APRN  05/03/23  15:35 EDT

## 2023-05-03 NOTE — PLAN OF CARE
Goal Outcome Evaluation:   Patient still pending Cdiff rule out, NPO since midnight. Colonoscopy scheduled for today, consent signed and in chart.

## 2023-05-03 NOTE — CASE MANAGEMENT/SOCIAL WORK
Continued Stay Note  LEONA Rivera     Patient Name: Angy Davies  MRN: [id number]  Today's Date: 5/3/2023    Admit Date: 5/1/2023    Plan: D/C plan: Anticipate home with spouse.   Discharge Plan     Row Name 05/03/23 1537       Plan    Plan D/C plan: Anticipate home with spouse.    Patient/Family in Agreement with Plan yes    Plan Comments Barrier to d/c: Colonoscopy today            Phone communication or documentation only - no physical contact with patient or family.      Carlos Trevino RN

## 2023-05-03 NOTE — OP NOTE
COLONOSCOPY Procedure Report    Patient Name:  Angy Davies  YOB: 1965    Date of Surgery:  5/3/2023     Pre-Op Diagnosis:  Diarrhea, unspecified type [R19.7]       Post-Op Diagnosis Codes:     * Diarrhea, unspecified type [R19.7]  Normal colonic mucosa to the terminal ileum status post random colon biopsies    Procedure/CPT® Codes:      Procedure(s):  COLONOSCOPY with random colon biopsy's    Staff:  Surgeon(s):  Mitesh Tapia MD      Anesthesia: Monitored Anesthesia Care    Description of Procedure:  A description of the procedure as well as risks, benefits and alternative methods were explained to the patient who voiced understanding and signed the corresponding consent form. A physical exam was performed and vital signs were monitored throughout the procedure.    A rectal exam was performed which was normal. An Olympus colonoscope was placed into the rectum and proceeded under direct visualization through the colon until the cecum and appendiceal orifice were identified. Careful visualization occurred upon slow withdraw of the scope. The scope was then retroflexed and the distal rectum was visualized. The quality of the prep was good. The procedure was not difficult and there were no immediate complications.    Specimen:        See Below    Estimated blood loss: none    Complications:  None    Findings:  Normal colonic mucosa to the terminal ileum status post random colon biopsies    Impression:  Chronic diarrhea which may be microscopic colitis status post random biopsies    Recommendations:  Await biopsy results  She can be discharged home anytime from a GI standpoint and okay to take up to 4 Imodium daily  Repeat colonoscopy 10 years      Mitesh Tapia MD     Date: 5/3/2023    Time: 14:12 EDT

## 2023-05-04 ENCOUNTER — INPATIENT HOSPITAL (OUTPATIENT)
Dept: URBAN - METROPOLITAN AREA HOSPITAL 84 | Facility: HOSPITAL | Age: 58
End: 2023-05-04

## 2023-05-04 VITALS
BODY MASS INDEX: 27.95 KG/M2 | DIASTOLIC BLOOD PRESSURE: 82 MMHG | HEART RATE: 88 BPM | RESPIRATION RATE: 17 BRPM | TEMPERATURE: 97.8 F | WEIGHT: 167.77 LBS | HEIGHT: 65 IN | OXYGEN SATURATION: 98 % | SYSTOLIC BLOOD PRESSURE: 114 MMHG

## 2023-05-04 DIAGNOSIS — R19.7 DIARRHEA, UNSPECIFIED: ICD-10-CM

## 2023-05-04 LAB
ANION GAP SERPL CALCULATED.3IONS-SCNC: 11 MMOL/L (ref 5–15)
BASOPHILS # BLD AUTO: 0 10*3/MM3 (ref 0–0.2)
BASOPHILS NFR BLD AUTO: 0.5 % (ref 0–1.5)
BUN SERPL-MCNC: 14 MG/DL (ref 6–20)
BUN/CREAT SERPL: 17.1 (ref 7–25)
CALCIUM SPEC-SCNC: 8.6 MG/DL (ref 8.6–10.5)
CHLORIDE SERPL-SCNC: 114 MMOL/L (ref 98–107)
CO2 SERPL-SCNC: 17 MMOL/L (ref 22–29)
CREAT SERPL-MCNC: 0.82 MG/DL (ref 0.57–1)
DEPRECATED RDW RBC AUTO: 43.8 FL (ref 37–54)
EGFRCR SERPLBLD CKD-EPI 2021: 83.5 ML/MIN/1.73
EOSINOPHIL # BLD AUTO: 0.2 10*3/MM3 (ref 0–0.4)
EOSINOPHIL NFR BLD AUTO: 1.8 % (ref 0.3–6.2)
ERYTHROCYTE [DISTWIDTH] IN BLOOD BY AUTOMATED COUNT: 12.9 % (ref 12.3–15.4)
GLUCOSE SERPL-MCNC: 101 MG/DL (ref 65–99)
HCT VFR BLD AUTO: 35 % (ref 34–46.6)
HGB BLD-MCNC: 12.3 G/DL (ref 12–15.9)
LYMPHOCYTES # BLD AUTO: 2.2 10*3/MM3 (ref 0.7–3.1)
LYMPHOCYTES NFR BLD AUTO: 25.1 % (ref 19.6–45.3)
MCH RBC QN AUTO: 32.1 PG (ref 26.6–33)
MCHC RBC AUTO-ENTMCNC: 35.3 G/DL (ref 31.5–35.7)
MCV RBC AUTO: 91 FL (ref 79–97)
MONOCYTES # BLD AUTO: 0.6 10*3/MM3 (ref 0.1–0.9)
MONOCYTES NFR BLD AUTO: 7 % (ref 5–12)
NEUTROPHILS NFR BLD AUTO: 5.8 10*3/MM3 (ref 1.7–7)
NEUTROPHILS NFR BLD AUTO: 65.6 % (ref 42.7–76)
NRBC BLD AUTO-RTO: 0.1 /100 WBC (ref 0–0.2)
PLATELET # BLD AUTO: 213 10*3/MM3 (ref 140–450)
PMV BLD AUTO: 8.6 FL (ref 6–12)
POTASSIUM SERPL-SCNC: 3.7 MMOL/L (ref 3.5–5.2)
RBC # BLD AUTO: 3.84 10*6/MM3 (ref 3.77–5.28)
SODIUM SERPL-SCNC: 142 MMOL/L (ref 136–145)
WBC NRBC COR # BLD: 8.8 10*3/MM3 (ref 3.4–10.8)

## 2023-05-04 PROCEDURE — 99232 SBSQ HOSP IP/OBS MODERATE 35: CPT | Performed by: NURSE PRACTITIONER

## 2023-05-04 PROCEDURE — G0378 HOSPITAL OBSERVATION PER HR: HCPCS

## 2023-05-04 PROCEDURE — 85025 COMPLETE CBC W/AUTO DIFF WBC: CPT | Performed by: NURSE PRACTITIONER

## 2023-05-04 PROCEDURE — 80048 BASIC METABOLIC PNL TOTAL CA: CPT | Performed by: NURSE PRACTITIONER

## 2023-05-04 RX ORDER — SACCHAROMYCES BOULARDII 250 MG
250 CAPSULE ORAL 2 TIMES DAILY
Qty: 30 CAPSULE | Refills: 1 | Status: SHIPPED | OUTPATIENT
Start: 2023-05-04

## 2023-05-04 RX ORDER — DIPHENOXYLATE HYDROCHLORIDE AND ATROPINE SULFATE 2.5; .025 MG/1; MG/1
2 TABLET ORAL 2 TIMES DAILY
Status: DISCONTINUED | OUTPATIENT
Start: 2023-05-04 | End: 2023-05-04 | Stop reason: HOSPADM

## 2023-05-04 RX ORDER — CHOLESTYRAMINE LIGHT 4 G/5.7G
1 POWDER, FOR SUSPENSION ORAL EVERY 12 HOURS SCHEDULED
Qty: 30 PACKET | Refills: 1 | Status: SHIPPED | OUTPATIENT
Start: 2023-05-04

## 2023-05-04 RX ORDER — PANTOPRAZOLE SODIUM 40 MG/1
40 TABLET, DELAYED RELEASE ORAL
Qty: 30 TABLET | Refills: 1 | Status: SHIPPED | OUTPATIENT
Start: 2023-05-05

## 2023-05-04 RX ORDER — LOPERAMIDE HYDROCHLORIDE 2 MG/1
2 CAPSULE ORAL 4 TIMES DAILY PRN
Status: DISCONTINUED | OUTPATIENT
Start: 2023-05-04 | End: 2023-05-04 | Stop reason: HOSPADM

## 2023-05-04 RX ORDER — LEVOTHYROXINE SODIUM 0.05 MG/1
50 TABLET ORAL DAILY
Qty: 30 TABLET | Refills: 1 | Status: SHIPPED | OUTPATIENT
Start: 2023-05-05

## 2023-05-04 RX ORDER — DICYCLOMINE HYDROCHLORIDE 10 MG/1
10 CAPSULE ORAL 4 TIMES DAILY
Qty: 120 CAPSULE | Refills: 1 | Status: SHIPPED | OUTPATIENT
Start: 2023-05-04

## 2023-05-04 RX ADMIN — DIPHENOXYLATE HYDROCHLORIDE AND ATROPINE SULFATE 2 TABLET: 2.5; .025 TABLET ORAL at 09:52

## 2023-05-04 RX ADMIN — DICYCLOMINE HYDROCHLORIDE 10 MG: 10 CAPSULE ORAL at 08:08

## 2023-05-04 RX ADMIN — Medication 250 MG: at 08:08

## 2023-05-04 RX ADMIN — PANTOPRAZOLE SODIUM 40 MG: 40 TABLET, DELAYED RELEASE ORAL at 05:52

## 2023-05-04 RX ADMIN — Medication 3 ML: at 08:08

## 2023-05-04 RX ADMIN — DICYCLOMINE HYDROCHLORIDE 10 MG: 10 CAPSULE ORAL at 12:39

## 2023-05-04 RX ADMIN — CHOLESTYRAMINE 4 G: 4 POWDER, FOR SUSPENSION ORAL at 08:08

## 2023-05-04 RX ADMIN — LEVOTHYROXINE SODIUM 50 MCG: 0.05 TABLET ORAL at 05:52

## 2023-05-04 RX ADMIN — POTASSIUM CHLORIDE 40 MEQ: 1500 TABLET, EXTENDED RELEASE ORAL at 08:08

## 2023-05-04 RX ADMIN — FERROUS SULFATE TAB EC 324 MG (65 MG FE EQUIVALENT) 324 MG: 324 (65 FE) TABLET DELAYED RESPONSE at 08:08

## 2023-05-04 NOTE — DISCHARGE SUMMARY
Date of Discharge:  5/4/2023    Discharge Diagnosis:   Diarrhea  Hypotension  Dehydration  Hypothyroid    Presenting Problem/History of Present Illness  Active Hospital Problems    Diagnosis  POA   • **Diarrhea, unspecified type [R19.7]  Yes      Resolved Hospital Problems   No resolved problems to display.          Hospital Course    Patient is a 57 y.o. female presented with diarrhea has been going on for nearly 3 weeks.  She was seen in urgent care and they started her on oral antibiotics thinking it was a virus.GI panel in the ED was negative, patient creatinine elevated electrolytes are elevated.  Patient was with supportive care and hydrated with electrolyte management.  S/p colonoscopy yesterday which was normal.  Diarrhea has decreased to about 4-day.  She was started on cholestyramine, Florastor, dicyclomine. Synthroid was increased with TSH of 7.51. She has follow up appointment with PCP in few weeks and will follow up with GI in 3 to 4 weeks. She is agreeable to this plan and discharge    Procedures Performed    Procedure(s):  COLONOSCOPY with random colon biopsy's  -------------------  05/03 1340 COLONOSCOPY    Consults:   Consults     Date and Time Order Name Status Description    5/2/2023 12:45 PM Inpatient Gastroenterology Consult Completed     5/1/2023  3:18 PM Family Medicine Consult            Pertinent Test Results:    Lab Results (most recent)     Procedure Component Value Units Date/Time    Tissue Pathology Exam [[id number]] Collected: 05/03/23 1405    Specimen: Tissue from Large Intestine, Right / Ascending Colon Updated: 05/04/23 1157    Basic Metabolic Panel [[id number]]  (Abnormal) Collected: 05/04/23 0053    Specimen: Blood from Arm, Left Updated: 05/04/23 0145     Glucose 101 mg/dL      BUN 14 mg/dL      Creatinine 0.82 mg/dL      Sodium 142 mmol/L      Potassium 3.7 mmol/L      Chloride 114 mmol/L      CO2 17.0 mmol/L      Calcium 8.6 mg/dL      BUN/Creatinine Ratio 17.1     Anion Gap  11.0 mmol/L      eGFR 83.5 mL/min/1.73     Narrative:      GFR Normal >60  Chronic Kidney Disease <60  Kidney Failure <15      CBC & Differential [[id number]]  (Normal) Collected: 05/04/23 0053    Specimen: Blood from Arm, Left Updated: 05/04/23 0127    Narrative:      The following orders were created for panel order CBC & Differential.  Procedure                               Abnormality         Status                     ---------                               -----------         ------                     CBC Auto Differential[[id number]]        Normal              Final result                 Please view results for these tests on the individual orders.    CBC Auto Differential [[id number]]  (Normal) Collected: 05/04/23 0053    Specimen: Blood from Arm, Left Updated: 05/04/23 0127     WBC 8.80 10*3/mm3      RBC 3.84 10*6/mm3      Hemoglobin 12.3 g/dL      Hematocrit 35.0 %      MCV 91.0 fL      MCH 32.1 pg      MCHC 35.3 g/dL      RDW 12.9 %      RDW-SD 43.8 fl      MPV 8.6 fL      Platelets 213 10*3/mm3      Neutrophil % 65.6 %      Lymphocyte % 25.1 %      Monocyte % 7.0 %      Eosinophil % 1.8 %      Basophil % 0.5 %      Neutrophils, Absolute 5.80 10*3/mm3      Lymphocytes, Absolute 2.20 10*3/mm3      Monocytes, Absolute 0.60 10*3/mm3      Eosinophils, Absolute 0.20 10*3/mm3      Basophils, Absolute 0.00 10*3/mm3      nRBC 0.1 /100 WBC     Clostridioides difficile Toxin - Stool, Per Rectum [[id number]]  (Normal) Collected: 05/02/23 1356    Specimen: Stool from Per Rectum Updated: 05/03/23 0739    Narrative:      The following orders were created for panel order Clostridioides difficile Toxin - Stool, Per Rectum.  Procedure                               Abnormality         Status                     ---------                               -----------         ------                     Clostridioides difficile...[[id number]]  Normal              Final result                 Please view results for these tests on  the individual orders.    Clostridioides difficile EIA - Stool, Per Rectum [[id number]]  (Normal) Collected: 05/02/23 1356    Specimen: Stool from Per Rectum Updated: 05/03/23 0739     C Diff GDH Ag Negative     C.diff Toxin Ag Negative    Narrative:      The result indicates the absence of toxigenic C.difficile from stool specimen.    Comprehensive Metabolic Panel [[id number]]  (Abnormal) Collected: 05/03/23 0510    Specimen: Blood from Arm, Left Updated: 05/03/23 0635     Glucose 95 mg/dL      BUN 15 mg/dL      Creatinine 0.88 mg/dL      Sodium 144 mmol/L      Potassium 3.5 mmol/L      Chloride 114 mmol/L      CO2 21.0 mmol/L      Calcium 8.4 mg/dL      Total Protein 6.4 g/dL      Albumin 3.8 g/dL      ALT (SGPT) 16 U/L      AST (SGOT) 20 U/L      Alkaline Phosphatase 68 U/L      Total Bilirubin 0.2 mg/dL      Globulin 2.6 gm/dL      A/G Ratio 1.5 g/dL      BUN/Creatinine Ratio 17.0     Anion Gap 9.0 mmol/L      eGFR 76.8 mL/min/1.73     Narrative:      GFR Normal >60  Chronic Kidney Disease <60  Kidney Failure <15      CBC & Differential [[id number]]  (Abnormal) Collected: 05/03/23 0510    Specimen: Blood from Arm, Left Updated: 05/03/23 0611    Narrative:      The following orders were created for panel order CBC & Differential.  Procedure                               Abnormality         Status                     ---------                               -----------         ------                     CBC Auto Differential[[id number]]        Abnormal            Final result                 Please view results for these tests on the individual orders.    CBC Auto Differential [[id number]]  (Abnormal) Collected: 05/03/23 0510    Specimen: Blood from Arm, Left Updated: 05/03/23 0611     WBC 7.20 10*3/mm3      RBC 3.76 10*6/mm3      Hemoglobin 12.0 g/dL      Hematocrit 34.0 %      MCV 90.3 fL      MCH 31.9 pg      MCHC 35.4 g/dL      RDW 12.9 %      RDW-SD 43.3 fl      MPV 8.4 fL      Platelets 188 10*3/mm3       Neutrophil % 59.5 %      Lymphocyte % 29.2 %      Monocyte % 8.5 %      Eosinophil % 2.4 %      Basophil % 0.4 %      Neutrophils, Absolute 4.30 10*3/mm3      Lymphocytes, Absolute 2.10 10*3/mm3      Monocytes, Absolute 0.60 10*3/mm3      Eosinophils, Absolute 0.20 10*3/mm3      Basophils, Absolute 0.00 10*3/mm3      nRBC 0.3 /100 WBC     Potassium [[id number]]  (Normal) Collected: 05/02/23 1711    Specimen: Blood Updated: 05/02/23 1738     Potassium 3.6 mmol/L      Comment: Slight hemolysis detected by analyzer. Results may be affected.       TSH [[id number]]  (Abnormal) Collected: 05/01/23 2345    Specimen: Blood from Arm, Left Updated: 05/02/23 1312     TSH 7.510 uIU/mL     T4, Free [[id number]]  (Normal) Collected: 05/01/23 2345    Specimen: Blood from Arm, Left Updated: 05/02/23 1312     Free T4 1.16 ng/dL     Narrative:      Results may be falsely increased if patient taking Biotin.      Basic Metabolic Panel [[id number]]  (Abnormal) Collected: 05/01/23 2345    Specimen: Blood from Arm, Left Updated: 05/02/23 0110     Glucose 104 mg/dL      BUN 23 mg/dL      Creatinine 1.14 mg/dL      Sodium 141 mmol/L      Potassium 2.6 mmol/L      Chloride 107 mmol/L      CO2 24.0 mmol/L      Calcium 9.2 mg/dL      BUN/Creatinine Ratio 20.2     Anion Gap 10.0 mmol/L      eGFR 56.3 mL/min/1.73     Narrative:      GFR Normal >60  Chronic Kidney Disease <60  Kidney Failure <15      Gastrointestinal Panel, PCR - Stool, Per Rectum [[id number]]  (Normal) Collected: 05/01/23 1235    Specimen: Stool from Per Rectum Updated: 05/01/23 1411     Campylobacter Not Detected     Plesiomonas shigelloides Not Detected     Salmonella Not Detected     Vibrio Not Detected     Vibrio cholerae Not Detected     Yersinia enterocolitica Not Detected     Enteroaggregative E. coli (EAEC) Not Detected     Enteropathogenic E. coli (EPEC) Not Detected     Enterotoxigenic E. coli (ETEC) lt/st Not Detected     Shiga-like toxin-producing E. coli (STEC) stx1/stx2  Not Detected     Shigella/Enteroinvasive E. coli (EIEC) Not Detected     Cryptosporidium Not Detected     Cyclospora cayetanensis Not Detected     Entamoeba histolytica Not Detected     Giardia lamblia Not Detected     Adenovirus F40/41 Not Detected     Astrovirus Not Detected     Norovirus GI/GII Not Detected     Rotavirus A Not Detected     Sapovirus (I, II, IV or V) Not Detected    Narrative:      If Aeromonas, Staphylococcus aureus or Bacillus cereus are suspected, please order [id number]: Stool Culture, Aeromonas, S aureus, B Cereus.    Magnesium [[id number]]  (Normal) Collected: 05/01/23 1150    Specimen: Blood Updated: 05/01/23 1312     Magnesium 2.1 mg/dL     Comprehensive Metabolic Panel [[id number]]  (Abnormal) Collected: 05/01/23 1150    Specimen: Blood Updated: 05/01/23 1252     Glucose 104 mg/dL      BUN 28 mg/dL      Creatinine 1.26 mg/dL      Sodium 139 mmol/L      Potassium 2.6 mmol/L      Chloride 103 mmol/L      CO2 25.0 mmol/L      Calcium 9.5 mg/dL      Total Protein 7.7 g/dL      Albumin 4.6 g/dL      ALT (SGPT) 24 U/L      AST (SGOT) 20 U/L      Alkaline Phosphatase 83 U/L      Total Bilirubin 0.3 mg/dL      Globulin 3.1 gm/dL      A/G Ratio 1.5 g/dL      BUN/Creatinine Ratio 22.2     Anion Gap 11.0 mmol/L      eGFR 49.9 mL/min/1.73     Narrative:      GFR Normal >60  Chronic Kidney Disease <60  Kidney Failure <15      Lipase [[id number]]  (Normal) Collected: 05/01/23 1150    Specimen: Blood Updated: 05/01/23 1249     Lipase 44 U/L     Urinalysis With Microscopic If Indicated (No Culture) - Urine, Clean Catch [[id number]]  (Abnormal) Collected: 05/01/23 1235    Specimen: Urine, Clean Catch Updated: 05/01/23 1247     Color, UA Yellow     Appearance, UA Clear     pH, UA 5.5     Specific Gravity, UA 1.011     Glucose, UA Negative     Ketones, UA Negative     Bilirubin, UA Negative     Blood, UA Trace     Protein, UA Negative     Leuk Esterase, UA Negative     Nitrite, UA Negative     Urobilinogen, UA 0.2  E.U./dL    Urinalysis, Microscopic Only - Urine, Clean Catch [[id number]]  (Abnormal) Collected: 05/01/23 1235    Specimen: Urine, Clean Catch Updated: 05/01/23 1247     RBC, UA 0-2 /HPF      WBC, UA 0-2 /HPF      Bacteria, UA None Seen /HPF      Squamous Epithelial Cells, UA 0-2 /HPF      Hyaline Casts, UA 3-6 /LPF      Methodology Automated Microscopy                  Condition on Discharge:  Stable    Vital Signs  Heart Rate:  [80-98] 88  Resp:  [10-18] 17  BP: ()/(58-94) 114/82      Physical Exam  Vitals reviewed.   Constitutional:       Appearance: She is not ill-appearing.   HENT:      Head: Normocephalic and atraumatic.      Right Ear: External ear normal.      Left Ear: External ear normal.      Nose: Nose normal.      Mouth/Throat:      Mouth: Mucous membranes are moist.   Eyes:      General:         Right eye: No discharge.         Left eye: No discharge.   Cardiovascular:      Rate and Rhythm: Normal rate and regular rhythm.      Pulses: Normal pulses.      Heart sounds: Normal heart sounds.   Pulmonary:      Effort: Pulmonary effort is normal.      Breath sounds: Normal breath sounds.   Abdominal:      General: Bowel sounds are normal.      Palpations: Abdomen is soft.   Musculoskeletal:         General: Normal range of motion.      Cervical back: Normal range of motion.   Skin:     General: Skin is warm and dry.   Neurological:      Mental Status: She is alert and oriented to person, place, and time.   Psychiatric:         Behavior: Behavior normal.              Discharge Disposition  Home or Self Care    Discharge Medications     Discharge Medications      New Medications      Instructions Start Date   cholestyramine light 4 g packet   4 g, Oral, Every 12 Hours Scheduled      dicyclomine 10 MG capsule  Commonly known as: BENTYL  Replaces: dicyclomine 20 MG tablet   10 mg, Oral, 4 Times Daily      pantoprazole 40 MG EC tablet  Commonly known as: PROTONIX   40 mg, Oral, Every Early Morning   Start  Date: May 5, 2023     saccharomyces boulardii 250 MG capsule  Commonly known as: FLORASTOR   250 mg, Oral, 2 Times Daily         Changes to Medications      Instructions Start Date   levothyroxine 50 MCG tablet  Commonly known as: SYNTHROID, LEVOTHROID  What changed: medication strength   50 mcg, Oral, Daily   Start Date: May 5, 2023        Continue These Medications      Instructions Start Date   amitriptyline 25 MG tablet  Commonly known as: ELAVIL   25 mg, Oral, Nightly      cyclobenzaprine 10 MG tablet  Commonly known as: FLEXERIL   TAKE ONE TABLET 2 TIMES A DAY AS NEEDED      diphenhydrAMINE-acetaminophen  MG tablet per tablet  Commonly known as: TYLENOL PM   Oral      ferrous sulfate 324 (65 Fe) MG tablet delayed-release EC tablet   324 mg, Oral, Daily With Breakfast      K-Phos 500 MG tablet  Generic drug: potassium phosphate (monobasic)   500 mg, Oral, 2 Times Daily      letrozole 2.5 MG tablet  Commonly known as: FEMARA   TAKE 1 TABLET BY MOUTH EVERY DAY      lisinopril-hydrochlorothiazide 10-12.5 MG per tablet  Commonly known as: PRINZIDE,ZESTORETIC   1 tablet, Oral, Daily      naproxen 500 MG tablet  Commonly known as: NAPROSYN   500 mg, Oral, 2 Times Daily With Meals      Os-Arthur Calcium + D3 500-5 MG-MCG tablet per tablet  Generic drug: Calcium Carb-Cholecalciferol   Oral      Prolia 60 MG/ML solution prefilled syringe syringe  Generic drug: denosumab   No dose, route, or frequency recorded.      traMADol 50 MG tablet  Commonly known as: ULTRAM   50 mg, Oral, Every 12 Hours PRN         Stop These Medications    dicyclomine 20 MG tablet  Commonly known as: BENTYL  Replaced by: dicyclomine 10 MG capsule            Discharge Diet:   Diet Instructions     Diet: Gastrointestinal Diets; Low Irritant; Regular Texture (IDDSI 7); Thin (IDDSI 0)      Discharge Diet: Gastrointestinal Diets    Gastrointestinal Diet: Low Irritant    Texture: Regular Texture (IDDSI 7)    Fluid Consistency: Thin (IDDSI 0)           Activity at Discharge:   Activity Instructions     Activity as Tolerated      Gradually Increase Activity Until at Pre-Hospitalization Level            Follow-up Appointments  No future appointments.  Additional Instructions for the Follow-ups that You Need to Schedule     Discharge Follow-up with PCP   As directed       Currently Documented PCP:    Germán Spencer FNP    PCP Phone Number:    [phone]     Follow Up Details: 5/17 at 1040               Test Results Pending at Discharge  Pending Labs     Order Current Status    Tissue Pathology Exam In process           CALEB Varela  05/04/23  12:15 EDT    Time: Discharge 25 min

## 2023-05-04 NOTE — PLAN OF CARE
Goal Outcome Evaluation:                 Patient slept through night with no complaints. No Change in status. VSS.

## 2023-05-04 NOTE — PROGRESS NOTES
LOS: 0 days   Patient Care Team:  Germán Spencer FNP as PCP - Juliette Baird MD as Consulting Physician (Hematology and Oncology)      Subjective     Interval History:     Subjective: Patient continues to complain of persistent diarrhea.  Reports large bowel movements that are yellow in color.  No overt GI bleeding.  Denies abdominal pain or nausea/vomiting.      ROS:   No chest pain, shortness of breath, or cough.        Medication Review:     Current Facility-Administered Medications:   •  acetaminophen (TYLENOL) tablet 650 mg, 650 mg, Oral, Q4H PRN, Mitesh Tapia MD  •  cholestyramine light packet 4 g, 1 packet, Oral, Q12H, Mitesh Tapia MD, 4 g at 05/04/23 0808  •  dicyclomine (BENTYL) capsule 10 mg, 10 mg, Oral, 4x Daily, Mitesh Tapia MD, 10 mg at 05/04/23 0808  •  diphenoxylate-atropine (LOMOTIL) 2.5-0.025 MG per tablet 2 tablet, 2 tablet, Oral, BID, Deepti Zhao, APRN  •  ferrous sulfate EC tablet 324 mg, 324 mg, Oral, Daily With Breakfast, Mitesh Tapia MD, 324 mg at 05/04/23 0808  •  hydrALAZINE (APRESOLINE) injection 10 mg, 10 mg, Intravenous, Q6H PRN, Mitesh Tapia MD  •  levothyroxine (SYNTHROID, LEVOTHROID) tablet 50 mcg, 50 mcg, Oral, Daily, Mitesh Tapia MD, 50 mcg at 05/04/23 0552  •  loperamide (IMODIUM) capsule 2 mg, 2 mg, Oral, 4x Daily PRN, Deepti Zhao, APRN  •  Magnesium Sulfate - Total Dose 2 grams - Magnesium 1.5 or Less, 2 g, Intravenous, PRN **OR** Magnesium Sulfate - Total Dose 1 gram - Magnesium 1.6 - 1.9, 1 g, Intravenous, PRN, Mitesh Tapia MD  •  ondansetron (ZOFRAN) tablet 4 mg, 4 mg, Oral, Q6H PRN **OR** ondansetron (ZOFRAN) injection 4 mg, 4 mg, Intravenous, Q6H PRN, Mitesh Tapia MD  •  pantoprazole (PROTONIX) EC tablet 40 mg, 40 mg, Oral, Q AM, Mitesh Tapia MD, 40 mg at 05/04/23 0552  •  potassium chloride (K-DUR,KLOR-CON) CR tablet 40 mEq, 40 mEq, Oral, Daily, Mitesh Tapia MD, 40 mEq at 05/04/23 0808  •  potassium  chloride (K-DUR,KLOR-CON) CR tablet 40 mEq, 40 mEq, Oral, PRN, 40 mEq at 05/02/23 1208 **OR** potassium chloride (KLOR-CON) packet 40 mEq, 40 mEq, Oral, PRN **OR** potassium chloride 10 mEq in 100 mL IVPB, 10 mEq, Intravenous, Q1H PRN, Mitesh Tapia MD  •  potassium phosphate 45 mmol in sodium chloride 0.9 % 500 mL infusion, 45 mmol, Intravenous, PRN **OR** potassium phosphates 30 mmol in 250 mL 0.9% sodium chloride IVPB, 30 mmol, Intravenous, PRN **OR** potassium phosphate 15 mmol in 0.9% sodium chloride 100 mL IVPB, 15 mmol, Intravenous, PRN **OR** sodium phosphates 45 mmol in sodium chloride 0.9 % 500 mL IVPB, 45 mmol, Intravenous, PRN **OR** sodium phosphates 30 mmol in sodium chloride 0.9 % 250 mL IVPB, 30 mmol, Intravenous, PRN **OR** sodium phosphates 15 mmol in 250 mL 0.9% sodium chloride IVPB, 15 mmol, Intravenous, PRN, Mitesh Tapia MD  •  saccharomyces boulardii (FLORASTOR) capsule 250 mg, 250 mg, Oral, BID, Mitesh Tapia MD, 250 mg at 05/04/23 0808  •  sodium chloride 0.9 % flush 10 mL, 10 mL, Intravenous, PRNRegina Steven, MD  •  sodium chloride 0.9 % flush 3 mL, 3 mL, Intravenous, Q12H, Mitesh Tapia MD, 3 mL at 05/04/23 0808  •  sodium chloride 0.9 % flush 3-10 mL, 3-10 mL, Intravenous, PRRegina BLANCO Steven, MD  •  sodium chloride 0.9 % infusion 40 mL, 40 mL, Intravenous, Regina MAZARIEGOS Steven, MD  •  sodium chloride 0.9 % infusion 40 mL, 40 mL, Intravenous, PRRegina BLANCO Steven, MD  •  sodium chloride 0.9 % infusion, 100 mL/hr, Intravenous, Continuous, Mitesh Tapia MD, Stopped at 05/03/23 1628  •  [COMPLETED] sorbitol solution 50 mL, 50 mL, Oral, Once, 30 mL at 05/03/23 0448 **FOLLOWED BY** sorbitol solution 50 mL, 50 mL, Oral, Once, Mitesh Tapia MD      Objective     Vital Signs  Vitals:    05/03/23 1425 05/03/23 1500 05/03/23 2105 05/04/23 0413   BP: 122/58 110/94 108/75 114/82   BP Location:    Left arm   Patient Position: Lying   Lying   Pulse: 80 84 80 88   Resp: 10 12 18  17   Temp:       TempSrc:       SpO2: 98% 95% 100% 98%   Weight:       Height:           Physical Exam:     General Appearance:    Awake and alert, in no acute distress   Head:    Normocephalic, without obvious abnormality   Eyes:          Conjunctivae normal, anicteric sclera   Throat:   No oral lesions, no thrush, oral mucosa moist   Neck:   No adenopathy, supple, no JVD   Lungs:     respirations regular, even and unlabored   Abdomen:     Soft, non-tender, no rebound or guarding, non-distendeD   Rectal:     Deferred   Extremities:   No edema, no cyanosis   Skin:   No bruising or rash, no jaundice        Results Review:    CBC    Results from last 7 days   Lab Units 05/04/23  0053 05/03/23  0510 05/01/23  2345 05/01/23  1150   WBC 10*3/mm3 8.80 7.20 9.60 6.60   HEMOGLOBIN g/dL 12.3 12.0 13.8 13.9   PLATELETS 10*3/mm3 213 188 239 287     CMP   Results from last 7 days   Lab Units 05/04/23  0053 05/03/23  0510 05/02/23  1711 05/01/23  2345 05/01/23  1150   SODIUM mmol/L 142 144  --  141 139   POTASSIUM mmol/L 3.7 3.5 3.6 2.6* 2.6*   CHLORIDE mmol/L 114* 114*  --  107 103   CO2 mmol/L 17.0* 21.0*  --  24.0 25.0   BUN mg/dL 14 15  --  23* 28*   CREATININE mg/dL 0.82 0.88  --  1.14* 1.26*   GLUCOSE mg/dL 101* 95  --  104* 104*   ALBUMIN g/dL  --  3.8  --   --  4.6   BILIRUBIN mg/dL  --  0.2  --   --  0.3   ALK PHOS U/L  --  68  --   --  83   AST (SGOT) U/L  --  20  --   --  20   ALT (SGPT) U/L  --  16  --   --  24   MAGNESIUM mg/dL  --   --   --   --  2.1   LIPASE U/L  --   --   --   --  44     Cr Clearance Estimated Creatinine Clearance: 77.2 mL/min (by C-G formula based on SCr of 0.82 mg/dL).  Coag     HbA1C   Lab Results   Component Value Date    HGBA1C 5.1 07/20/2022    HGBA1C 5.2 12/15/2021     Blood Glucose No results found for: POCGLU  Infection     UA    Results from last 7 days   Lab Units 05/01/23  1235   NITRITE UA  Negative   WBC UA /HPF 0-2*   BACTERIA UA /HPF None Seen   SQUAM EPITHEL UA /HPF 0-2      Radiology(recent) No radiology results for the last day       Assessment & Plan     ASSESSMENT:  -Diarrhea  -Lower abdominal pain  -Nausea  -Unintentional weight loss  -Hypokalemia  -ELEUTERIO  -History of breast cancer  -History of hysterectomy  -History of appendectomy  -History of cholecystectomy     PLAN:  Patient is a pleasant 57-year-old female with history of breast cancer, hysterectomy, cholecystectomy, and appendectomy who presents with complaints of acute kidney injury and diarrhea.    Patient continues to complain of persistent diarrhea.  Reports large watery bowel movements that are yellow in color.  No overt GI bleeding.  GI PCR negative.  C. difficile negative.  CT abdomen/pelvis does not show any acute abnormality.  S/p colonoscopy yesterday which was normal.  Random colon biopsies were obtained.  Await results.  Continue cholestyramine twice daily,Florastor, and dicyclomine.  We will add scheduled Lomotil and Imodium as needed.  Diet as tolerated.  Okay for discharge home from GI standpoint once diarrhea has slowed.  Titrate antidiarrheals as needed.  She can follow-up in our office in 3 to 4 weeks following discharge.  We will be available as inpatient as needed      Electronically signed by CALEB Ching, 05/04/23, 9:25 AM EDT.

## 2023-05-04 NOTE — CASE MANAGEMENT/SOCIAL WORK
Continued Stay Note  LEONA Rivera     Patient Name: Angy Davies  MRN: [id number]  Today's Date: 5/4/2023    Admit Date: 5/1/2023    Plan: D/C plan: Anticipate home with spouse.   Discharge Plan     Row Name 05/04/23 1032       Plan    Plan D/C plan: Anticipate home with spouse.    Patient/Family in Agreement with Plan yes    Plan Comments Barrier to d/c: IVF,GI following                   Phone communication or documentation only - no physical contact with patient or family.      Carlos Trevino RN

## 2023-05-04 NOTE — PLAN OF CARE
Goal Outcome Evaluation:              Outcome Evaluation: Pt discharging. Pt to follow up with GI in 3-4 weeks. Low residue diet education completed. Pt verbalizes understanding.

## 2023-05-05 ENCOUNTER — READMISSION MANAGEMENT (OUTPATIENT)
Dept: CALL CENTER | Facility: HOSPITAL | Age: 58
End: 2023-05-05
Payer: COMMERCIAL

## 2023-05-05 LAB
LAB AP CASE REPORT: NORMAL
PATH REPORT.FINAL DX SPEC: NORMAL
PATH REPORT.GROSS SPEC: NORMAL

## 2023-05-05 NOTE — CASE MANAGEMENT/SOCIAL WORK
Case Management Discharge Note      Final Note: Home    Provided Post Acute Provider List?: N/A  N/A Provider List Comment: anticipate home    Selected Continued Care - Discharged on 5/4/2023 Admission date: 5/1/2023 - Discharge disposition: Home or Self Care            Transportation Services  Private: Car    Final Discharge Disposition Code: 01 - home or self-care

## 2023-05-05 NOTE — OUTREACH NOTE
Prep Survey    Flowsheet Row Responses   [facility] facility patient discharged from? [facility]   Is LACE score < 7 ? No   Eligibility Readm Mgmt   Discharge diagnosis Diarrhea   Does the patient have one of the following disease processes/diagnoses(primary or secondary)? Other   Does the patient have Home health ordered? No   Is there a DME ordered? No   Prep survey completed? Yes          Rica BENÍTEZ - Registered Nurse

## 2023-05-08 ENCOUNTER — TELEPHONE (OUTPATIENT)
Dept: ONCOLOGY | Facility: CLINIC | Age: 58
End: 2023-05-08
Payer: COMMERCIAL

## 2023-05-08 NOTE — TELEPHONE ENCOUNTER
"  Caller: Angy Davies \"HOMER\"    Relationship: Self    Best call back number: [phone]    What is the best time to reach you: ASAP    Who are you requesting to speak with (clinical staff, provider,  specific staff member): SCHEDULING    What was the call regarding: PT NEEDS TO RESCHEDULE 6 MO. FOLLOW UP WITH DR LAGUERRE, AND FREDY POWERS.  SHE WOULD LIKE A Friday APPT, MAY 26TH IF POSSIBLE, FOR FOLLOW UP, AND SHOT TO BE SCHEDULED IN June.    Do you require a callback: YES            "

## 2023-05-09 ENCOUNTER — READMISSION MANAGEMENT (OUTPATIENT)
Dept: CALL CENTER | Facility: HOSPITAL | Age: 58
End: 2023-05-09
Payer: COMMERCIAL

## 2023-05-09 NOTE — OUTREACH NOTE
"Medical Week 1 Survey    Flowsheet Row Responses   [facility] patient discharged from? [facility]   Does the patient have one of the following disease processes/diagnoses(primary or secondary)? Other   Week 1 attempt successful? Yes   Call start time 1046   Call end time 1048   Discharge diagnosis Diarrhea   Person spoke with today (if not patient) and relationship    Meds reviewed with patient/caregiver? Yes   Does the patient have all medications ordered at discharge? Yes   Is the patient taking all medications as directed (includes completed medication regime)? Yes   Does the patient have a primary care provider?  Yes   Does the patient have an appointment with their PCP within 7 days of discharge? Greater than 7 days   Nursing Interventions Verified appointment date/time/provider  [reminded of appt with PCP listed on AVS of 5/17/23 ]   Has home health visited the patient within 72 hours of discharge? N/A   Psychosocial issues? No   Did the patient receive a copy of their discharge instructions? Yes   Nursing interventions Reviewed instructions with patient  [with ]   What is the patient's perception of their health status since discharge? Improving  [Husbnad reports pt is \"alot better\" and that she was \"in bad shape\"as she had diarrhea for weeks before seeking care  Pt has returned to work and is tolerating po intake, no issues with diarrhea per .  REminded of f/u appt.]   Is the patient/caregiver able to teach back signs and symptoms related to disease process for when to call PCP? Yes   Is the patient/caregiver able to teach back signs and symptoms related to disease process for when to call 911? Yes   Week 1 call completed? Yes   Graduated Yes  [No immediate needs, improved, returned to work]          CYN BENÍTEZ - Registered Nurse  "

## 2023-07-28 ENCOUNTER — OFFICE (OUTPATIENT)
Dept: URBAN - METROPOLITAN AREA CLINIC 64 | Facility: CLINIC | Age: 58
End: 2023-07-28

## 2023-07-28 VITALS — HEART RATE: 91 BPM | SYSTOLIC BLOOD PRESSURE: 118 MMHG | DIASTOLIC BLOOD PRESSURE: 94 MMHG | WEIGHT: 166 LBS

## 2023-07-28 DIAGNOSIS — K52.832 LYMPHOCYTIC COLITIS: ICD-10-CM

## 2023-07-28 PROCEDURE — 99214 OFFICE O/P EST MOD 30 MIN: CPT | Performed by: INTERNAL MEDICINE

## 2023-07-28 RX ORDER — DICYCLOMINE HYDROCHLORIDE 10 MG/1
40 CAPSULE ORAL
Qty: 0 | Refills: 0 | Status: COMPLETED
End: 2023-07-28

## 2023-07-28 RX ORDER — CHOLESTYRAMINE 4 G/9G
8 POWDER, FOR SUSPENSION ORAL
Qty: 0 | Refills: 0 | Status: COMPLETED
End: 2023-07-28

## 2023-07-28 RX ORDER — COLESTIPOL HYDROCHLORIDE 1 G/1
3 TABLET, FILM COATED ORAL
Qty: 90 | Refills: 11 | Status: COMPLETED
Start: 2023-07-28 | End: 2024-05-17

## 2023-10-13 ENCOUNTER — HOSPITAL ENCOUNTER (OUTPATIENT)
Dept: ULTRASOUND IMAGING | Facility: HOSPITAL | Age: 58
Discharge: HOME | End: 2023-10-13
Payer: COMMERCIAL

## 2023-10-13 ENCOUNTER — HOSPITAL ENCOUNTER (OUTPATIENT)
Dept: MAMMOGRAPHY | Facility: HOSPITAL | Age: 58
Discharge: HOME | End: 2023-10-13
Payer: COMMERCIAL

## 2023-10-13 DIAGNOSIS — C50.919 MALIGNANT NEOPLASM OF FEMALE BREAST, UNSPECIFIED ESTROGEN RECEPTOR STATUS, UNSPECIFIED LATERALITY, UNSPECIFIED SITE OF BREAST: ICD-10-CM

## 2023-10-13 PROCEDURE — G0279 TOMOSYNTHESIS, MAMMO: HCPCS

## 2023-10-13 PROCEDURE — 77066 DX MAMMO INCL CAD BI: CPT

## 2023-12-01 ENCOUNTER — TELEPHONE (OUTPATIENT)
Dept: ONCOLOGY | Facility: CLINIC | Age: 58
End: 2023-12-01

## 2023-12-01 NOTE — TELEPHONE ENCOUNTER
"  Caller: Angy Davies \"HOMER\"    Relationship: Self    Best call back number: [phone]    What is the best time to reach you: ANYTIME    Who are you requesting to speak with (clinical staff, provider,  specific staff member):     Do you know the name of the person who called:     What was the call regarding: PATIENT WAS RETURNING A CALL.    TRIED TO W/T NO ONE ANSWER     Is it okay if the provider responds through MyChart:     "

## 2024-01-02 NOTE — PROGRESS NOTES
Hematology/Oncology Outpatient Follow Up    PATIENT NAME:Angy Davies  :1965  MRN: [id number]  PRIMARY CARE PHYSICIAN: Germán Spencer FNP  REFERRING PHYSICIAN: Germán Spencer FNP    Chief Complaint   Patient presents with    Follow-up     6 month follow up  Malignant neoplasm of female breast, unspecified estrogen receptor status, unspecified laterality, unspecified site of breast        HISTORY OF PRESENT ILLNESS:     This a 57-year-old female who in 2018 was found to have stage I invasive ductal carcinoma of the right breast.  There underwent right lumpectomy followed by sentinel lymph node biopsy at [facility] on 2018.  Pathology showed grade 1 tubular carcinoma measuring 6.5 mm associated with some DCIS.  Margins were negative.  And lymph nodes were also negative pathologic stage was pT1b N0 M0.  Patient was then placed on adjuvant tamoxifen following radiation treatment.  She has been under the care of Dr. House.  Patient still that she developed progressive memory issues while on tamoxifen and had requested to have her treatments switched.  Review of Dr. House's note suggest that the patient did have hormonal levels for estradiol and FSH FSH was in the postmenopausal range but estradiol was still within perimenopausal range.  She was asked to continue tamoxifen with repeat hormonal levels 6 to 12 months.  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  She states she does not want to lose her job as she is fearful that tamoxifen effects will continue to be a problem.  She has been on tamoxifen since .  Her last mammogram was 2019.  She denies any breast lumps, nipple discharge or skin discoloration.  2/15/2019 she had a DEXA scan which showed osteopenia and patient is currently on calcium with vitamin D.  She also had intermittent abnormalities in her thyroid function testing.  Had a B12 level which was normal  at 693    9/17/2020: Patient discontinued tamoxifen due to memory issues.  9/17/2020 patient had a chemistry panel which was actually unremarkable.  Estradiol was less than 5 which is in the postmenopausal range and FSH was 53 which is also in the post menopausal range white count was 7, hemoglobin 12.2 and platelets are 221.  Differentials where 69% neutrophils, 32% lymphocytes, there was no monocytosis eosinophilia or basophilia  9/29/2020 she had bilateral diagnostic mammogram which showed dense breast tissue.  But no evidence of malignancy was seen.  Follow-up in 1 year was recommended.  9/29/2020 patient had bone density which showed osteopenia  Patient discontinued Aromasin due to diarrhea  4/20/2021: Patient was placed on Femara 2.5 mg p.o. daily  September 2021 she had bilateral diagnostic mammogram which was negative  5/3/2022: Bone scan showing no malignant or metastatic disease  10/7/2022: Bilateral diagnostic mammogram negative for malignancy in either breast  10/7/2022: DEXA scan showing osteopenia  10/13/2023: Bilateral diagnostic mammogram showing no mammographic evidence of malignancy    Past Medical History:   Diagnosis Date    Allergic     Arthritis     Asthma     Breast cancer     Cataract     Chronic diarrhea     Headache     Hx of radiation therapy     Injury of back     Osteoporosis        Past Surgical History:   Procedure Laterality Date    BREAST BIOPSY      BREAST LUMPECTOMY      COLONOSCOPY N/A 5/3/2023    Procedure: COLONOSCOPY with random colon biopsy's;  Surgeon: Mitesh Tapia MD;  Location: [facility] ENDOSCOPY;  Service: Gastroenterology;  Laterality: N/A;    HYSTERECTOMY           Current Outpatient Medications:     amitriptyline (ELAVIL) 25 MG tablet, Take 1 tablet by mouth Every Night., Disp: , Rfl:     Calcium Carb-Cholecalciferol (Os-Arthur Calcium + D3) 500-200 MG-UNIT tablet, Take  by mouth., Disp: , Rfl:     colestipol (COLESTID) 1 g tablet, Take 1 tablet by mouth 3 (Three) Times  a Day., Disp: , Rfl:     cyclobenzaprine (FLEXERIL) 10 MG tablet, TAKE ONE TABLET 2 TIMES A DAY AS NEEDED, Disp: , Rfl:     denosumab (Prolia) 60 MG/ML solution prefilled syringe syringe, , Disp: , Rfl:     ferrous sulfate 324 (65 Fe) MG tablet delayed-release EC tablet, Take 1 tablet by mouth Daily With Breakfast., Disp: , Rfl:     K-Phos 500 MG tablet, Take 1 tablet by mouth 2 (Two) Times a Day., Disp: , Rfl:     letrozole (FEMARA) 2.5 MG tablet, Take 1 tablet by mouth Daily., Disp: 90 tablet, Rfl: 2    levothyroxine (SYNTHROID, LEVOTHROID) 50 MCG tablet, Take 1 tablet by mouth Daily., Disp: 30 tablet, Rfl: 1    lisinopril-hydrochlorothiazide (PRINZIDE,ZESTORETIC) 10-12.5 MG per tablet, Take 1 tablet by mouth Daily., Disp: , Rfl:     ondansetron (ZOFRAN) 4 MG tablet, Take 2 tablets by mouth., Disp: , Rfl:     pantoprazole (PROTONIX) 40 MG EC tablet, Take 1 tablet by mouth Every Morning., Disp: 30 tablet, Rfl: 1    saccharomyces boulardii (FLORASTOR) 250 MG capsule, Take 1 capsule by mouth 2 (Two) Times a Day., Disp: 30 capsule, Rfl: 1    SUMAtriptan (IMITREX) 50 MG tablet, , Disp: , Rfl:     cholestyramine light 4 g packet, Take 1 packet by mouth Every 12 (Twelve) Hours. (Patient not taking: Reported on 1/5/2024), Disp: 30 packet, Rfl: 1    diphenhydrAMINE-acetaminophen (TYLENOL PM)  MG tablet per tablet, Take  by mouth. (Patient not taking: Reported on 1/5/2024), Disp: , Rfl:     traMADol (ULTRAM) 50 MG tablet, Take 1 tablet by mouth Every 12 (Twelve) Hours As Needed for Moderate Pain., Disp: , Rfl:   No current facility-administered medications for this visit.    Facility-Administered Medications Ordered in Other Visits:     denosumab (PROLIA) syringe 60 mg, 60 mg, Subcutaneous, Once, Juliette Harris MD    Allergies   Allergen Reactions    Aspirin Nausea And Vomiting    Penicillins GI Intolerance    Letrozole Rash       Family History   Problem Relation Age of Onset    Breast cancer Mother      Ovarian cancer Mother     Breast cancer Maternal Grandmother        Cancer-related family history includes Breast cancer in her maternal grandmother and mother; Ovarian cancer in her mother.    Social History     Tobacco Use    Smoking status: Former     Years: 2     Types: Cigarettes    Smokeless tobacco: Never   Vaping Use    Vaping Use: Never used   Substance Use Topics    Alcohol use: Yes     Comment: rarely    Drug use: Never       I have reviewed and confirmed the accuracy of the patient's history: Chief complaint, HPI, ROS, and Subjective as entered by the MA/LPN/RN. Taylor Mishra, APRN 01/05/24        SUBJECTIVE:  Camille is here today for her routine follow-up.  She reports that she is doing well.  She is compliant with her Femara and has no significant side effects.  She is compliant with monthly self breast exam and has not noticed any new changes.  She does note that she has chronic lower back pain and bilateral knee pain for which she has been on multiple medication regimens to control without much success.  She is questioning if possibly seeing an orthopedic doctor would be helpful.  She was noted to have a bone scan in 2022 showing mild degenerative disease.        REVIEW OF SYSTEMS:    Review of Systems   Constitutional:  Negative for chills and fever.   HENT:  Negative for ear pain, mouth sores, nosebleeds and sore throat.    Eyes:  Negative for photophobia and visual disturbance.   Respiratory:  Negative for wheezing and stridor.    Cardiovascular:  Negative for chest pain and palpitations.   Gastrointestinal:  Negative for abdominal pain, diarrhea, nausea and vomiting.   Endocrine: Negative for cold intolerance and heat intolerance.   Genitourinary:  Negative for dysuria and hematuria.   Musculoskeletal:  Positive for arthralgias. Negative for joint swelling and neck stiffness.   Skin:  Negative for color change and rash.   Neurological:  Negative for seizures and syncope.   Hematological:   "Negative for adenopathy.        No obvious bleeding   Psychiatric/Behavioral:  Negative for agitation, confusion and hallucinations.            OBJECTIVE:    Vitals:    01/05/24 1048   BP: 123/83   Pulse: 82   SpO2: 98%   Weight: 76 kg (167 lb 9.6 oz)   Height: 165.1 cm (65\")   PainSc: 10-Worst pain ever   PainLoc: Back       Body mass index is 27.89 kg/m².    ECOG    (0) Fully active, able to carry on all predisease performance without restriction    Physical Exam  Vitals and nursing note reviewed.   Constitutional:       General: She is not in acute distress.     Appearance: She is not diaphoretic.   HENT:      Head: Normocephalic and atraumatic.   Eyes:      General: No scleral icterus.        Right eye: No discharge.         Left eye: No discharge.      Conjunctiva/sclera: Conjunctivae normal.   Neck:      Thyroid: No thyromegaly.   Cardiovascular:      Rate and Rhythm: Normal rate and regular rhythm.      Heart sounds: Normal heart sounds.      No friction rub. No gallop.   Pulmonary:      Effort: Pulmonary effort is normal. No respiratory distress.      Breath sounds: No stridor. No wheezing.   Chest:      Chest wall: No mass.   Breasts:     Breasts are symmetrical.      Right: No swelling, mass, nipple discharge, skin change or tenderness.      Left: Normal. No swelling, mass, nipple discharge, skin change or tenderness.      Comments: Scar on the right breast in the upper outer quadrant from lumpectomy  Abdominal:      General: Bowel sounds are normal.      Palpations: Abdomen is soft. There is no mass.      Tenderness: There is no abdominal tenderness. There is no guarding or rebound.   Musculoskeletal:         General: No tenderness. Normal range of motion.      Cervical back: Normal range of motion and neck supple.   Lymphadenopathy:      Cervical: No cervical adenopathy.   Skin:     General: Skin is warm.      Findings: No erythema or rash.   Neurological:      Mental Status: She is alert and oriented to " person, place, and time.      Motor: No abnormal muscle tone.   Psychiatric:         Behavior: Behavior normal.       I have reexamined the patient and the results are consistent with the previously documented exam. CALEB Martinez        RECENT LABS    WBC   Date Value Ref Range Status   01/05/2024 6.71 3.40 - 10.80 10*3/mm3 Final     RBC   Date Value Ref Range Status   01/05/2024 4.13 3.77 - 5.28 10*6/mm3 Final     Hemoglobin   Date Value Ref Range Status   01/05/2024 13.1 12.0 - 15.9 g/dL Final     Hematocrit   Date Value Ref Range Status   01/05/2024 40.2 34.0 - 46.6 % Final     MCV   Date Value Ref Range Status   01/05/2024 97.3 (H) 79.0 - 97.0 fL Final     MCH   Date Value Ref Range Status   01/05/2024 31.7 26.6 - 33.0 pg Final     MCHC   Date Value Ref Range Status   01/05/2024 32.6 31.5 - 35.7 g/dL Final     RDW   Date Value Ref Range Status   01/05/2024 12.6 12.3 - 15.4 % Final     RDW-SD   Date Value Ref Range Status   01/05/2024 43.9 37.0 - 54.0 fl Final     MPV   Date Value Ref Range Status   01/05/2024 9.6 6.0 - 12.0 fL Final     Platelets   Date Value Ref Range Status   01/05/2024 227 140 - 450 10*3/mm3 Final     Neutrophil %   Date Value Ref Range Status   01/05/2024 60.4 42.7 - 76.0 % Final     Lymphocyte %   Date Value Ref Range Status   01/05/2024 25.3 19.6 - 45.3 % Final     Monocyte %   Date Value Ref Range Status   01/05/2024 10.9 5.0 - 12.0 % Final     Eosinophil %   Date Value Ref Range Status   01/05/2024 3.1 0.3 - 6.2 % Final     Basophil %   Date Value Ref Range Status   01/05/2024 0.3 0.0 - 1.5 % Final     Neutrophils, Absolute   Date Value Ref Range Status   01/05/2024 4.05 1.70 - 7.00 10*3/mm3 Final     Lymphocytes, Absolute   Date Value Ref Range Status   01/05/2024 1.70 0.70 - 3.10 10*3/mm3 Final     Monocytes, Absolute   Date Value Ref Range Status   01/05/2024 0.73 0.10 - 0.90 10*3/mm3 Final     Eosinophils, Absolute   Date Value Ref Range Status   01/05/2024 0.21 0.00 -  0.40 10*3/mm3 Final     Basophils, Absolute   Date Value Ref Range Status   01/05/2024 0.02 0.00 - 0.20 10*3/mm3 Final     nRBC   Date Value Ref Range Status   05/04/2023 0.1 0.0 - 0.2 /100 WBC Final       Lab Results   Component Value Date    GLUCOSE 91 06/30/2023    BUN 18 06/30/2023    CREATININE 0.93 06/30/2023    EGFRIFNONA 62 07/21/2021    BCR 19.4 06/30/2023    K 3.8 06/30/2023    CO2 30.0 (H) 06/30/2023    CALCIUM 9.6 06/30/2023    ALBUMIN 4.4 06/30/2023    LABIL2 1.4 11/28/2017    AST 17 06/30/2023    ALT 14 06/30/2023         Assessment & Plan     Malignant neoplasm of female breast, unspecified estrogen receptor status, unspecified laterality, unspecified site of breast  - CBC & Differential  - letrozole (FEMARA) 2.5 MG tablet    Aromatase inhibitor use    Osteopenia, unspecified location        ASSESSMENT:    Invasive ductal carcinoma of the right breast status post right lumpectomy with sentinel lymph node biopsy ER positive, [place] positive, HER-2/birdie negative... T1b N0 M0.  Status post radiation therapy.  Ongoing management  Was on adjuvant endocrine therapy with tamoxifen discontinued due to memory issues  Could not tolerate Aromasin due to diarrhea  On Femara 2.5 mg April 20, 2021.  Patient will continue  Body aches and pains: We will further evaluate with bone scan.  This has been ordered.  Bone scan was negative for malignancy.  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  Anemia has resolved        Plans     CBC reviewed with patient   Continue Femara 2.5 mg p.o. daily  Reviewed her mammogram from October 2023.  Next mammogram due October 2024  Reviewed estradiol, serum FSH, CBC and CMP: Patient is now postmenopausal  Continue Prolia every 6 months-due today.  CMP, magnesium,  phosphorus levels per routine  Reviewed DEXA scan from October 2022, will be again due October 2024.  She will continue continue Os-Arthur D twice a day.  Reviewed.  Monthly breast self exams and call for lumps, nipple discharge, skin discoloration or breast pain  Follow-up 6 months with Dr. Harris or sooner if needed  Note given for work adjustments due to arthritic symptoms in her finger joints previously  Advised to follow-up with her PCP to further address her arthralgias and possible referral to specialist  All questions answered to the best of my abilities        Patient verbalized understanding and is in agreement of the above plan.            Thank you very much for allowing me to participate in the care of Sneha, I will keep you updated on her progress         I spent 30 total minutes, face-to-face, caring for Angy today. 90% of this time involved counseling and/or coordination of care as documented within this note.

## 2024-01-05 ENCOUNTER — APPOINTMENT (OUTPATIENT)
Dept: LAB | Facility: HOSPITAL | Age: 59
End: 2024-01-05
Payer: COMMERCIAL

## 2024-01-05 ENCOUNTER — HOSPITAL ENCOUNTER (OUTPATIENT)
Dept: ONCOLOGY | Facility: HOSPITAL | Age: 59
Discharge: HOME | End: 2024-01-05
Payer: COMMERCIAL

## 2024-01-05 ENCOUNTER — OFFICE VISIT (OUTPATIENT)
Dept: ONCOLOGY | Facility: CLINIC | Age: 59
End: 2024-01-05
Payer: COMMERCIAL

## 2024-01-05 VITALS
BODY MASS INDEX: 27.92 KG/M2 | WEIGHT: 167.6 LBS | DIASTOLIC BLOOD PRESSURE: 83 MMHG | SYSTOLIC BLOOD PRESSURE: 123 MMHG | OXYGEN SATURATION: 98 % | HEART RATE: 82 BPM | HEIGHT: 65 IN

## 2024-01-05 DIAGNOSIS — Z79.811 AROMATASE INHIBITOR USE: ICD-10-CM

## 2024-01-05 DIAGNOSIS — C50.919 MALIGNANT NEOPLASM OF FEMALE BREAST, UNSPECIFIED ESTROGEN RECEPTOR STATUS, UNSPECIFIED LATERALITY, UNSPECIFIED SITE OF BREAST: Primary | ICD-10-CM

## 2024-01-05 DIAGNOSIS — M85.80 OSTEOPENIA, UNSPECIFIED LOCATION: ICD-10-CM

## 2024-01-05 LAB
BASOPHILS # BLD AUTO: 0.02 10*3/MM3 (ref 0–0.2)
BASOPHILS NFR BLD AUTO: 0.3 % (ref 0–1.5)
DEPRECATED RDW RBC AUTO: 43.9 FL (ref 37–54)
EOSINOPHIL # BLD AUTO: 0.21 10*3/MM3 (ref 0–0.4)
EOSINOPHIL NFR BLD AUTO: 3.1 % (ref 0.3–6.2)
ERYTHROCYTE [DISTWIDTH] IN BLOOD BY AUTOMATED COUNT: 12.6 % (ref 12.3–15.4)
HCT VFR BLD AUTO: 40.2 % (ref 34–46.6)
HGB BLD-MCNC: 13.1 G/DL (ref 12–15.9)
HOLD SPECIMEN: NORMAL
HOLD SPECIMEN: NORMAL
LYMPHOCYTES # BLD AUTO: 1.7 10*3/MM3 (ref 0.7–3.1)
LYMPHOCYTES NFR BLD AUTO: 25.3 % (ref 19.6–45.3)
MCH RBC QN AUTO: 31.7 PG (ref 26.6–33)
MCHC RBC AUTO-ENTMCNC: 32.6 G/DL (ref 31.5–35.7)
MCV RBC AUTO: 97.3 FL (ref 79–97)
MONOCYTES # BLD AUTO: 0.73 10*3/MM3 (ref 0.1–0.9)
MONOCYTES NFR BLD AUTO: 10.9 % (ref 5–12)
NEUTROPHILS NFR BLD AUTO: 4.05 10*3/MM3 (ref 1.7–7)
NEUTROPHILS NFR BLD AUTO: 60.4 % (ref 42.7–76)
PLATELET # BLD AUTO: 227 10*3/MM3 (ref 140–450)
PMV BLD AUTO: 9.6 FL (ref 6–12)
RBC # BLD AUTO: 4.13 10*6/MM3 (ref 3.77–5.28)
WBC NRBC COR # BLD AUTO: 6.71 10*3/MM3 (ref 3.4–10.8)

## 2024-01-05 PROCEDURE — 36415 COLL VENOUS BLD VENIPUNCTURE: CPT

## 2024-01-05 PROCEDURE — 96372 THER/PROPH/DIAG INJ SC/IM: CPT

## 2024-01-05 PROCEDURE — 85025 COMPLETE CBC W/AUTO DIFF WBC: CPT | Performed by: NURSE PRACTITIONER

## 2024-01-05 PROCEDURE — 25010000002 DENOSUMAB 60 MG/ML SOLUTION PREFILLED SYRINGE: Performed by: INTERNAL MEDICINE

## 2024-01-05 RX ORDER — SUMATRIPTAN 50 MG/1
TABLET, FILM COATED ORAL
COMMUNITY
Start: 2023-11-28

## 2024-01-05 RX ORDER — LETROZOLE 2.5 MG/1
2.5 TABLET, FILM COATED ORAL DAILY
Qty: 90 TABLET | Refills: 2 | Status: SHIPPED | OUTPATIENT
Start: 2024-01-05

## 2024-01-05 RX ORDER — ONDANSETRON 4 MG/1
8 TABLET, FILM COATED ORAL
COMMUNITY
Start: 2023-09-09

## 2024-01-05 RX ORDER — MONTELUKAST SODIUM 4 MG/1
1 TABLET, CHEWABLE ORAL 3 TIMES DAILY
COMMUNITY

## 2024-01-05 RX ADMIN — DENOSUMAB 60 MG: 60 INJECTION SUBCUTANEOUS at 12:20

## 2024-01-05 NOTE — PROGRESS NOTES
Patient here for N.P. visit and Prolia injection. Patient received Prolia injection and tolerated injection well. Patient gave AVS per N.P. staff.

## 2024-02-02 ENCOUNTER — OFFICE (OUTPATIENT)
Dept: URBAN - METROPOLITAN AREA CLINIC 64 | Facility: CLINIC | Age: 59
End: 2024-02-02

## 2024-02-02 VITALS
HEIGHT: 65 IN | DIASTOLIC BLOOD PRESSURE: 84 MMHG | HEART RATE: 82 BPM | WEIGHT: 169 LBS | SYSTOLIC BLOOD PRESSURE: 118 MMHG

## 2024-02-02 DIAGNOSIS — K52.9 NONINFECTIVE GASTROENTERITIS AND COLITIS, UNSPECIFIED: ICD-10-CM

## 2024-02-02 DIAGNOSIS — R15.2 FECAL URGENCY: ICD-10-CM

## 2024-02-02 PROCEDURE — 99213 OFFICE O/P EST LOW 20 MIN: CPT | Performed by: NURSE PRACTITIONER

## 2024-02-02 RX ORDER — DIPHENOXYLATE HYDROCHLORIDE AND ATROPINE SULFATE 2.5; .025 MG/1; MG/1
7.5 TABLET ORAL
Qty: 90 | Refills: 0 | Status: COMPLETED
Start: 2024-02-02 | End: 2024-05-17

## 2024-03-15 ENCOUNTER — OFFICE (OUTPATIENT)
Dept: URBAN - METROPOLITAN AREA CLINIC 64 | Facility: CLINIC | Age: 59
End: 2024-03-15

## 2024-03-15 VITALS
WEIGHT: 169 LBS | DIASTOLIC BLOOD PRESSURE: 78 MMHG | SYSTOLIC BLOOD PRESSURE: 115 MMHG | HEART RATE: 84 BPM | HEIGHT: 65 IN

## 2024-03-15 DIAGNOSIS — K52.839 MICROSCOPIC COLITIS, UNSPECIFIED: ICD-10-CM

## 2024-03-15 DIAGNOSIS — R19.7 DIARRHEA, UNSPECIFIED: ICD-10-CM

## 2024-03-15 PROCEDURE — 99213 OFFICE O/P EST LOW 20 MIN: CPT | Performed by: NURSE PRACTITIONER

## 2024-03-15 RX ORDER — COLESTIPOL HYDROCHLORIDE 1 G/1
4 TABLET, FILM COATED ORAL
Qty: 120 | Refills: 11 | Status: ACTIVE
Start: 2024-03-15

## 2024-03-15 RX ORDER — DIPHENOXYLATE HYDROCHLORIDE AND ATROPINE SULFATE 2.5; .025 MG/1; MG/1
TABLET ORAL
Qty: 240 | Refills: 5 | Status: ACTIVE
Start: 2024-03-15

## 2024-05-09 ENCOUNTER — OFFICE (OUTPATIENT)
Dept: URBAN - METROPOLITAN AREA LAB 2 | Facility: LAB | Age: 59
End: 2024-05-09

## 2024-05-09 DIAGNOSIS — A08.11 ACUTE GASTROENTEROPATHY DUE TO NORWALK AGENT: ICD-10-CM

## 2024-05-09 PROCEDURE — 87505 NFCT AGENT DETECTION GI: CPT | Performed by: NURSE PRACTITIONER

## 2024-05-17 ENCOUNTER — OFFICE (OUTPATIENT)
Dept: URBAN - METROPOLITAN AREA CLINIC 64 | Facility: CLINIC | Age: 59
End: 2024-05-17

## 2024-05-17 VITALS
HEART RATE: 86 BPM | SYSTOLIC BLOOD PRESSURE: 108 MMHG | WEIGHT: 165 LBS | HEIGHT: 65 IN | DIASTOLIC BLOOD PRESSURE: 57 MMHG

## 2024-05-17 DIAGNOSIS — R19.7 DIARRHEA, UNSPECIFIED: ICD-10-CM

## 2024-05-17 DIAGNOSIS — K52.9 NONINFECTIVE GASTROENTERITIS AND COLITIS, UNSPECIFIED: ICD-10-CM

## 2024-05-17 DIAGNOSIS — A08.11 ACUTE GASTROENTEROPATHY DUE TO NORWALK AGENT: ICD-10-CM

## 2024-05-17 DIAGNOSIS — R15.2 FECAL URGENCY: ICD-10-CM

## 2024-05-17 PROCEDURE — 99213 OFFICE O/P EST LOW 20 MIN: CPT | Performed by: NURSE PRACTITIONER

## 2024-05-17 RX ORDER — COLESTIPOL HYDROCHLORIDE 1 G/1
6 TABLET, FILM COATED ORAL
Qty: 180 | Refills: 12 | Status: ACTIVE
Start: 2024-05-17

## 2024-05-28 ENCOUNTER — OFFICE (OUTPATIENT)
Dept: URBAN - METROPOLITAN AREA CLINIC 64 | Facility: CLINIC | Age: 59
End: 2024-05-28

## 2024-05-28 DIAGNOSIS — K52.9 NONINFECTIVE GASTROENTERITIS AND COLITIS, UNSPECIFIED: ICD-10-CM

## 2024-06-18 ENCOUNTER — TELEPHONE (OUTPATIENT)
Dept: ONCOLOGY | Facility: CLINIC | Age: 59
End: 2024-06-18
Payer: COMMERCIAL

## 2024-06-18 ENCOUNTER — TELEPHONE (OUTPATIENT)
Dept: ONCOLOGY | Facility: CLINIC | Age: 59
End: 2024-06-18

## 2024-06-18 NOTE — TELEPHONE ENCOUNTER
"      Hub staff attempted to follow warm transfer process and was unsuccessful     Caller: Angy Davies \"HOMER\"    Relationship to patient: Self    Best call back number: [phone]    Patient is needing: TO RETURN CALL ABOUT RESCHEDULING 07/05 APPOINTMENT     IF CAN WOULD LIKE TO RESCHEDULE   FOR 07/12 FOR LAB, FOLLOW UP 1 AND INJECTION     CAN SEE DR LAGUERRE OR CALEB IS FINE ALSO           "

## 2024-07-22 DIAGNOSIS — C50.919 MALIGNANT NEOPLASM OF FEMALE BREAST, UNSPECIFIED ESTROGEN RECEPTOR STATUS, UNSPECIFIED LATERALITY, UNSPECIFIED SITE OF BREAST: ICD-10-CM

## 2024-07-23 RX ORDER — LETROZOLE 2.5 MG/1
2.5 TABLET, FILM COATED ORAL DAILY
Qty: 90 TABLET | Refills: 2 | Status: SHIPPED | OUTPATIENT
Start: 2024-07-23

## 2024-08-08 DIAGNOSIS — C50.919 MALIGNANT NEOPLASM OF FEMALE BREAST, UNSPECIFIED ESTROGEN RECEPTOR STATUS, UNSPECIFIED LATERALITY, UNSPECIFIED SITE OF BREAST: Primary | ICD-10-CM

## 2024-08-08 NOTE — PROGRESS NOTES
Hematology/Oncology Outpatient Follow Up    PATIENT NAME:Angy Davies  :1965  MRN: [id number]  PRIMARY CARE PHYSICIAN: Germán Spencer APRN  REFERRING PHYSICIAN: No ref. provider found    No chief complaint on file.       HISTORY OF PRESENT ILLNESS:     This a 57-year-old female who in 2018 was found to have stage I invasive ductal carcinoma of the right breast.  There underwent right lumpectomy followed by sentinel lymph node biopsy at [facility] on 2018.  Pathology showed grade 1 tubular carcinoma measuring 6.5 mm associated with some DCIS.  Margins were negative.  And lymph nodes were also negative pathologic stage was pT1b N0 M0.  Patient was then placed on adjuvant tamoxifen following radiation treatment.  She has been under the care of Dr. House.  Patient still that she developed progressive memory issues while on tamoxifen and had requested to have her treatments switched.  Review of Dr. House's note suggest that the patient did have hormonal levels for estradiol and FSH FSH was in the postmenopausal range but estradiol was still within perimenopausal range.  She was asked to continue tamoxifen with repeat hormonal levels 6 to 12 months.  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  She states she does not want to lose her job as she is fearful that tamoxifen effects will continue to be a problem.  She has been on tamoxifen since .  Her last mammogram was 2019.  She denies any breast lumps, nipple discharge or skin discoloration.  2/15/2019 she had a DEXA scan which showed osteopenia and patient is currently on calcium with vitamin D.  She also had intermittent abnormalities in her thyroid function testing.  Had a B12 level which was normal at 693    2020: Patient discontinued tamoxifen due to memory issues.  2020 patient had a chemistry panel which was actually unremarkable.  Estradiol was less  than 5 which is in the postmenopausal range and FSH was 53 which is also in the post menopausal range white count was 7, hemoglobin 12.2 and platelets are 221.  Differentials where 69% neutrophils, 32% lymphocytes, there was no monocytosis eosinophilia or basophilia  9/29/2020 she had bilateral diagnostic mammogram which showed dense breast tissue.  But no evidence of malignancy was seen.  Follow-up in 1 year was recommended.  9/29/2020 patient had bone density which showed osteopenia  Patient discontinued Aromasin due to diarrhea  4/20/2021: Patient was placed on Femara 2.5 mg p.o. daily  September 2021 she had bilateral diagnostic mammogram which was negative  5/3/2022: Bone scan showing no malignant or metastatic disease  10/7/2022: Bilateral diagnostic mammogram negative for malignancy in either breast  10/7/2022: DEXA scan showing osteopenia  10/13/2023: Bilateral diagnostic mammogram showing no mammographic evidence of malignancy    Past Medical History:   Diagnosis Date   • Allergic    • Arthritis    • Asthma    • Breast cancer    • Cataract    • Chronic diarrhea    • Headache    • Hx of radiation therapy    • Injury of back    • Osteoporosis        Past Surgical History:   Procedure Laterality Date   • BREAST BIOPSY     • BREAST LUMPECTOMY     • COLONOSCOPY N/A 5/3/2023    Procedure: COLONOSCOPY with random colon biopsy's;  Surgeon: Mitesh Tapia MD;  Location: [facility] ENDOSCOPY;  Service: Gastroenterology;  Laterality: N/A;   • HYSTERECTOMY           Current Outpatient Medications:   •  amitriptyline (ELAVIL) 25 MG tablet, Take 1 tablet by mouth Every Night., Disp: , Rfl:   •  Calcium Carb-Cholecalciferol (Os-Arthur Calcium + D3) 500-200 MG-UNIT tablet, Take  by mouth., Disp: , Rfl:   •  cholestyramine light 4 g packet, Take 1 packet by mouth Every 12 (Twelve) Hours. (Patient not taking: Reported on 1/5/2024), Disp: 30 packet, Rfl: 1  •  colestipol (COLESTID) 1 g tablet, Take 1 tablet by mouth 3 (Three)  Times a Day., Disp: , Rfl:   •  cyclobenzaprine (FLEXERIL) 10 MG tablet, TAKE ONE TABLET 2 TIMES A DAY AS NEEDED, Disp: , Rfl:   •  denosumab (Prolia) 60 MG/ML solution prefilled syringe syringe, , Disp: , Rfl:   •  diphenhydrAMINE-acetaminophen (TYLENOL PM)  MG tablet per tablet, Take  by mouth. (Patient not taking: Reported on 1/5/2024), Disp: , Rfl:   •  ferrous sulfate 324 (65 Fe) MG tablet delayed-release EC tablet, Take 1 tablet by mouth Daily With Breakfast., Disp: , Rfl:   •  K-Phos 500 MG tablet, Take 1 tablet by mouth 2 (Two) Times a Day., Disp: , Rfl:   •  letrozole (FEMARA) 2.5 MG tablet, Take 1 tablet by mouth Daily., Disp: 90 tablet, Rfl: 2  •  levothyroxine (SYNTHROID, LEVOTHROID) 50 MCG tablet, Take 1 tablet by mouth Daily., Disp: 30 tablet, Rfl: 1  •  lisinopril-hydrochlorothiazide (PRINZIDE,ZESTORETIC) 10-12.5 MG per tablet, Take 1 tablet by mouth Daily., Disp: , Rfl:   •  ondansetron (ZOFRAN) 4 MG tablet, Take 2 tablets by mouth., Disp: , Rfl:   •  pantoprazole (PROTONIX) 40 MG EC tablet, Take 1 tablet by mouth Every Morning., Disp: 30 tablet, Rfl: 1  •  saccharomyces boulardii (FLORASTOR) 250 MG capsule, Take 1 capsule by mouth 2 (Two) Times a Day., Disp: 30 capsule, Rfl: 1  •  SUMAtriptan (IMITREX) 50 MG tablet, , Disp: , Rfl:   •  traMADol (ULTRAM) 50 MG tablet, Take 1 tablet by mouth Every 12 (Twelve) Hours As Needed for Moderate Pain., Disp: , Rfl:     Allergies   Allergen Reactions   • Aspirin Nausea And Vomiting   • Penicillins GI Intolerance   • Letrozole Rash       Family History   Problem Relation Age of Onset   • Breast cancer Mother    • Ovarian cancer Mother    • Breast cancer Maternal Grandmother        Cancer-related family history includes Breast cancer in her maternal grandmother and mother; Ovarian cancer in her mother.    Social History     Tobacco Use   • Smoking status: Former     Types: Cigarettes   • Smokeless tobacco: Never   Vaping Use   • Vaping status: Never Used    Substance Use Topics   • Alcohol use: Yes     Comment: rarely   • Drug use: Never       I have reviewed and confirmed the accuracy of the patient's history: Chief complaint, HPI, ROS, and Subjective as entered by the MA/LPN/RN. Mecca Richards MA 08/08/24        SUBJECTIVE:  Camille is here today for her routine follow-up.  She reports that she is doing well.  She is compliant with her Femara and has no significant side effects.  She is compliant with monthly self breast exam and has not noticed any new changes.  She does note that she has chronic lower back pain and bilateral knee pain for which she has been on multiple medication regimens to control without much success.  She is questioning if possibly seeing an orthopedic doctor would be helpful.  She was noted to have a bone scan in 2022 showing mild degenerative disease.        REVIEW OF SYSTEMS:    Review of Systems   Constitutional:  Negative for chills and fever.   HENT:  Negative for ear pain, mouth sores, nosebleeds and sore throat.    Eyes:  Negative for photophobia and visual disturbance.   Respiratory:  Negative for wheezing and stridor.    Cardiovascular:  Negative for chest pain and palpitations.   Gastrointestinal:  Negative for abdominal pain, diarrhea, nausea and vomiting.   Endocrine: Negative for cold intolerance and heat intolerance.   Genitourinary:  Negative for dysuria and hematuria.   Musculoskeletal:  Positive for arthralgias. Negative for joint swelling and neck stiffness.   Skin:  Negative for color change and rash.   Neurological:  Negative for seizures and syncope.   Hematological:  Negative for adenopathy.        No obvious bleeding   Psychiatric/Behavioral:  Negative for agitation, confusion and hallucinations.            OBJECTIVE:    There were no vitals filed for this visit.      There is no height or weight on file to calculate BMI.    ECOG    (0) Fully active, able to carry on all predisease performance without  restriction    Physical Exam  Vitals and nursing note reviewed.   Constitutional:       General: She is not in acute distress.     Appearance: She is not diaphoretic.   HENT:      Head: Normocephalic and atraumatic.   Eyes:      General: No scleral icterus.        Right eye: No discharge.         Left eye: No discharge.      Conjunctiva/sclera: Conjunctivae normal.   Neck:      Thyroid: No thyromegaly.   Cardiovascular:      Rate and Rhythm: Normal rate and regular rhythm.      Heart sounds: Normal heart sounds.      No friction rub. No gallop.   Pulmonary:      Effort: Pulmonary effort is normal. No respiratory distress.      Breath sounds: No stridor. No wheezing.   Chest:      Chest wall: No mass.   Breasts:     Breasts are symmetrical.      Right: No swelling, mass, nipple discharge, skin change or tenderness.      Left: Normal. No swelling, mass, nipple discharge, skin change or tenderness.      Comments: Scar on the right breast in the upper outer quadrant from lumpectomy  Abdominal:      General: Bowel sounds are normal.      Palpations: Abdomen is soft. There is no mass.      Tenderness: There is no abdominal tenderness. There is no guarding or rebound.   Musculoskeletal:         General: No tenderness. Normal range of motion.      Cervical back: Normal range of motion and neck supple.   Lymphadenopathy:      Cervical: No cervical adenopathy.   Skin:     General: Skin is warm.      Findings: No erythema or rash.   Neurological:      Mental Status: She is alert and oriented to person, place, and time.      Motor: No abnormal muscle tone.   Psychiatric:         Behavior: Behavior normal.     I have reexamined the patient and the results are consistent with the previously documented exam. Mecca Richards MA        RECENT LABS    WBC   Date Value Ref Range Status   01/05/2024 6.71 3.40 - 10.80 10*3/mm3 Final     RBC   Date Value Ref Range Status   01/05/2024 4.13 3.77 - 5.28 10*6/mm3 Final     Hemoglobin   Date  Value Ref Range Status   01/05/2024 13.1 12.0 - 15.9 g/dL Final     Hematocrit   Date Value Ref Range Status   01/05/2024 40.2 34.0 - 46.6 % Final     MCV   Date Value Ref Range Status   01/05/2024 97.3 (H) 79.0 - 97.0 fL Final     MCH   Date Value Ref Range Status   01/05/2024 31.7 26.6 - 33.0 pg Final     MCHC   Date Value Ref Range Status   01/05/2024 32.6 31.5 - 35.7 g/dL Final     RDW   Date Value Ref Range Status   01/05/2024 12.6 12.3 - 15.4 % Final     RDW-SD   Date Value Ref Range Status   01/05/2024 43.9 37.0 - 54.0 fl Final     MPV   Date Value Ref Range Status   01/05/2024 9.6 6.0 - 12.0 fL Final     Platelets   Date Value Ref Range Status   01/05/2024 227 140 - 450 10*3/mm3 Final     Neutrophil %   Date Value Ref Range Status   01/05/2024 60.4 42.7 - 76.0 % Final     Lymphocyte %   Date Value Ref Range Status   01/05/2024 25.3 19.6 - 45.3 % Final     Monocyte %   Date Value Ref Range Status   01/05/2024 10.9 5.0 - 12.0 % Final     Eosinophil %   Date Value Ref Range Status   01/05/2024 3.1 0.3 - 6.2 % Final     Basophil %   Date Value Ref Range Status   01/05/2024 0.3 0.0 - 1.5 % Final     Neutrophils, Absolute   Date Value Ref Range Status   01/05/2024 4.05 1.70 - 7.00 10*3/mm3 Final     Lymphocytes, Absolute   Date Value Ref Range Status   01/05/2024 1.70 0.70 - 3.10 10*3/mm3 Final     Monocytes, Absolute   Date Value Ref Range Status   01/05/2024 0.73 0.10 - 0.90 10*3/mm3 Final     Eosinophils, Absolute   Date Value Ref Range Status   01/05/2024 0.21 0.00 - 0.40 10*3/mm3 Final     Basophils, Absolute   Date Value Ref Range Status   01/05/2024 0.02 0.00 - 0.20 10*3/mm3 Final     nRBC   Date Value Ref Range Status   05/04/2023 0.1 0.0 - 0.2 /100 WBC Final       Lab Results   Component Value Date    GLUCOSE 91 06/30/2023    BUN 18 06/30/2023    CREATININE 0.93 06/30/2023    EGFRIFNONA 62 07/21/2021    BCR 19.4 06/30/2023    K 3.8 06/30/2023    CO2 30.0 (H) 06/30/2023    CALCIUM 9.6 06/30/2023    ALBUMIN  4.4 06/30/2023    LABIL2 1.4 11/28/2017    AST 17 06/30/2023    ALT 14 06/30/2023         Assessment & Plan     There are no diagnoses linked to this encounter.        ASSESSMENT:    Invasive ductal carcinoma of the right breast status post right lumpectomy with sentinel lymph node biopsy ER positive, HI positive, HER-2/birdie negative... T1b N0 M0.  Status post radiation therapy.  Ongoing management  Was on adjuvant endocrine therapy with tamoxifen discontinued due to memory issues  Could not tolerate Aromasin due to diarrhea  On Femara 2.5 mg April 20, 2021.  Patient will continue  Body aches and pains: We will further evaluate with bone scan.  This has been ordered.  Bone scan was negative for malignancy.  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  Anemia has resolved        Plans     CBC reviewed with patient   Continue Femara 2.5 mg p.o. daily  Reviewed her mammogram from October 2023.  Next mammogram due October 2024  Reviewed estradiol, serum FSH, CBC and CMP: Patient is now postmenopausal  Continue Prolia every 6 months-due today.  CMP, magnesium, phosphorus levels per routine  Reviewed DEXA scan from October 2022, will be again due October 2024.  She will continue continue Os-Arthur D twice a day.  Reviewed.  Monthly breast self exams and call for lumps, nipple discharge, skin discoloration or breast pain  Follow-up 6 months with Dr. Harris or sooner if needed  Note given for work adjustments due to arthritic symptoms in her finger joints previously  Advised to follow-up with her PCP to further address her arthralgias and possible referral to specialist  All questions answered to the best of my abilities        Patient verbalized understanding and is in agreement of the above plan.             Thank you very much for allowing me to participate in the care of Sneha, I will keep you updated on her progress         I spent 30 total minutes, face-to-face, caring for Angy today. 90% of this time involved counseling and/or coordination of care as documented within this note.

## 2024-08-09 ENCOUNTER — HOSPITAL ENCOUNTER (OUTPATIENT)
Dept: ONCOLOGY | Facility: HOSPITAL | Age: 59
Discharge: HOME | End: 2024-08-09
Payer: COMMERCIAL

## 2024-08-09 ENCOUNTER — OFFICE VISIT (OUTPATIENT)
Dept: ONCOLOGY | Facility: CLINIC | Age: 59
End: 2024-08-09
Payer: COMMERCIAL

## 2024-08-09 ENCOUNTER — LAB (OUTPATIENT)
Dept: LAB | Facility: HOSPITAL | Age: 59
End: 2024-08-09
Payer: COMMERCIAL

## 2024-08-09 VITALS
OXYGEN SATURATION: 96 % | BODY MASS INDEX: 26.99 KG/M2 | SYSTOLIC BLOOD PRESSURE: 106 MMHG | WEIGHT: 162 LBS | HEART RATE: 90 BPM | DIASTOLIC BLOOD PRESSURE: 77 MMHG | HEIGHT: 65 IN | TEMPERATURE: 98.4 F

## 2024-08-09 DIAGNOSIS — C50.919 MALIGNANT NEOPLASM OF FEMALE BREAST, UNSPECIFIED ESTROGEN RECEPTOR STATUS, UNSPECIFIED LATERALITY, UNSPECIFIED SITE OF BREAST: Primary | ICD-10-CM

## 2024-08-09 DIAGNOSIS — M85.80 OSTEOPENIA, UNSPECIFIED LOCATION: ICD-10-CM

## 2024-08-09 DIAGNOSIS — Z79.811 AROMATASE INHIBITOR USE: ICD-10-CM

## 2024-08-09 LAB
ALBUMIN SERPL-MCNC: 4.5 G/DL (ref 3.5–5.2)
ALBUMIN/GLOB SERPL: 1.6 G/DL
ALP SERPL-CCNC: 79 U/L (ref 39–117)
ALT SERPL W P-5'-P-CCNC: 12 U/L (ref 1–33)
ANION GAP SERPL CALCULATED.3IONS-SCNC: 9.5 MMOL/L (ref 5–15)
AST SERPL-CCNC: 20 U/L (ref 1–32)
BASOPHILS # BLD AUTO: 0.03 10*3/MM3 (ref 0–0.2)
BASOPHILS NFR BLD AUTO: 0.6 % (ref 0–1.5)
BILIRUB SERPL-MCNC: 0.3 MG/DL (ref 0–1.2)
BUN SERPL-MCNC: 21 MG/DL (ref 6–20)
BUN/CREAT SERPL: 20.4 (ref 7–25)
CALCIUM SPEC-SCNC: 10.2 MG/DL (ref 8.6–10.5)
CHLORIDE SERPL-SCNC: 101 MMOL/L (ref 98–107)
CO2 SERPL-SCNC: 29.5 MMOL/L (ref 22–29)
CREAT SERPL-MCNC: 1.03 MG/DL (ref 0.57–1)
DEPRECATED RDW RBC AUTO: 42.3 FL (ref 37–54)
EGFRCR SERPLBLD CKD-EPI 2021: 63.2 ML/MIN/1.73
EOSINOPHIL # BLD AUTO: 0.2 10*3/MM3 (ref 0–0.4)
EOSINOPHIL NFR BLD AUTO: 3.9 % (ref 0.3–6.2)
ERYTHROCYTE [DISTWIDTH] IN BLOOD BY AUTOMATED COUNT: 12.2 % (ref 12.3–15.4)
GLOBULIN UR ELPH-MCNC: 2.9 GM/DL
GLUCOSE SERPL-MCNC: 89 MG/DL (ref 65–99)
HCT VFR BLD AUTO: 39.6 % (ref 34–46.6)
HGB BLD-MCNC: 12.9 G/DL (ref 12–15.9)
HOLD SPECIMEN: NORMAL
HOLD SPECIMEN: NORMAL
LYMPHOCYTES # BLD AUTO: 1.65 10*3/MM3 (ref 0.7–3.1)
LYMPHOCYTES NFR BLD AUTO: 32.2 % (ref 19.6–45.3)
MAGNESIUM SERPL-MCNC: 1.8 MG/DL (ref 1.6–2.6)
MCH RBC QN AUTO: 31.5 PG (ref 26.6–33)
MCHC RBC AUTO-ENTMCNC: 32.6 G/DL (ref 31.5–35.7)
MCV RBC AUTO: 96.8 FL (ref 79–97)
MONOCYTES # BLD AUTO: 0.76 10*3/MM3 (ref 0.1–0.9)
MONOCYTES NFR BLD AUTO: 14.8 % (ref 5–12)
NEUTROPHILS NFR BLD AUTO: 2.49 10*3/MM3 (ref 1.7–7)
NEUTROPHILS NFR BLD AUTO: 48.5 % (ref 42.7–76)
PHOSPHATE SERPL-MCNC: 3.8 MG/DL (ref 2.5–4.5)
PLATELET # BLD AUTO: 163 10*3/MM3 (ref 140–450)
PMV BLD AUTO: 11 FL (ref 6–12)
POTASSIUM SERPL-SCNC: 3.8 MMOL/L (ref 3.5–5.2)
PROT SERPL-MCNC: 7.4 G/DL (ref 6–8.5)
RBC # BLD AUTO: 4.09 10*6/MM3 (ref 3.77–5.28)
SODIUM SERPL-SCNC: 140 MMOL/L (ref 136–145)
WBC NRBC COR # BLD AUTO: 5.13 10*3/MM3 (ref 3.4–10.8)

## 2024-08-09 PROCEDURE — 80053 COMPREHEN METABOLIC PANEL: CPT | Performed by: NURSE PRACTITIONER

## 2024-08-09 PROCEDURE — 96372 THER/PROPH/DIAG INJ SC/IM: CPT

## 2024-08-09 PROCEDURE — 25010000002 DENOSUMAB 60 MG/ML SOLUTION PREFILLED SYRINGE: Performed by: INTERNAL MEDICINE

## 2024-08-09 PROCEDURE — 85025 COMPLETE CBC W/AUTO DIFF WBC: CPT

## 2024-08-09 PROCEDURE — 83735 ASSAY OF MAGNESIUM: CPT | Performed by: INTERNAL MEDICINE

## 2024-08-09 PROCEDURE — 36415 COLL VENOUS BLD VENIPUNCTURE: CPT

## 2024-08-09 PROCEDURE — 84100 ASSAY OF PHOSPHORUS: CPT | Performed by: INTERNAL MEDICINE

## 2024-08-09 RX ORDER — METRONIDAZOLE 500 MG/1
TABLET ORAL
COMMUNITY
Start: 2024-05-24

## 2024-08-09 RX ORDER — NAPROXEN SODIUM 275 MG/1
TABLET ORAL
COMMUNITY

## 2024-08-09 RX ORDER — RIZATRIPTAN BENZOATE 10 MG/1
1 TABLET, ORALLY DISINTEGRATING ORAL DAILY
COMMUNITY
Start: 2024-04-17

## 2024-08-09 RX ORDER — DIPHENOXYLATE HYDROCHLORIDE AND ATROPINE SULFATE 2.5; .025 MG/1; MG/1
240 TABLET ORAL
COMMUNITY
Start: 2024-03-15

## 2024-08-09 RX ORDER — CALCIUM CARBONATE 750 MG/1
TABLET, CHEWABLE ORAL
COMMUNITY

## 2024-08-09 RX ADMIN — DENOSUMAB 60 MG: 60 INJECTION SUBCUTANEOUS at 12:06

## 2024-08-09 NOTE — PROGRESS NOTES
Hematology/Oncology Outpatient Follow Up    PATIENT NAME:Angy Davies  :1965  MRN: [id number]  PRIMARY CARE PHYSICIAN: Germán Spencer APRN  REFERRING PHYSICIAN: No ref. provider found    Chief Complaint   Patient presents with    Follow-up     Malignant neoplasm of female breast, unspecified estrogen receptor status, unspecified laterality, unspecified site of breast        HISTORY OF PRESENT ILLNESS:     This a 57-year-old female who in 2018 was found to have stage I invasive ductal carcinoma of the right breast.  There underwent right lumpectomy followed by sentinel lymph node biopsy at [facility] on 2018.  Pathology showed grade 1 tubular carcinoma measuring 6.5 mm associated with some DCIS.  Margins were negative.  And lymph nodes were also negative pathologic stage was pT1b N0 M0.  Patient was then placed on adjuvant tamoxifen following radiation treatment.  She has been under the care of Dr. House.  Patient still that she developed progressive memory issues while on tamoxifen and had requested to have her treatments switched.  Review of Dr. House's note suggest that the patient did have hormonal levels for estradiol and FSH FSH was in the postmenopausal range but estradiol was still within perimenopausal range.  She was asked to continue tamoxifen with repeat hormonal levels 6 to 12 months.  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  She states she does not want to lose her job as she is fearful that tamoxifen effects will continue to be a problem.  She has been on tamoxifen since .  Her last mammogram was 2019.  She denies any breast lumps, nipple discharge or skin discoloration.  2/15/2019 she had a DEXA scan which showed osteopenia and patient is currently on calcium with vitamin D.  She also had intermittent abnormalities in her thyroid function testing.  Had a B12 level which was normal at  693    9/17/2020: Patient discontinued tamoxifen due to memory issues.  9/17/2020 patient had a chemistry panel which was actually unremarkable.  Estradiol was less than 5 which is in the postmenopausal range and FSH was 53 which is also in the post menopausal range white count was 7, hemoglobin 12.2 and platelets are 221.  Differentials where 69% neutrophils, 32% lymphocytes, there was no monocytosis eosinophilia or basophilia  9/29/2020 she had bilateral diagnostic mammogram which showed dense breast tissue.  But no evidence of malignancy was seen.  Follow-up in 1 year was recommended.  9/29/2020 patient had bone density which showed osteopenia  Patient discontinued Aromasin due to diarrhea  4/20/2021: Patient was placed on Femara 2.5 mg p.o. daily  September 2021 she had bilateral diagnostic mammogram which was negative  5/3/2022: Bone scan showing no malignant or metastatic disease  10/7/2022: Bilateral diagnostic mammogram negative for malignancy in either breast  10/7/2022: DEXA scan showing osteopenia  10/13/2023: Bilateral diagnostic mammogram showing no mammographic evidence of malignancy    Past Medical History:   Diagnosis Date    Allergic     Arthritis     Asthma     Breast cancer     Cataract     Chronic diarrhea     Headache     Hx of radiation therapy     Injury of back     Osteoporosis        Past Surgical History:   Procedure Laterality Date    BREAST BIOPSY      BREAST LUMPECTOMY      COLONOSCOPY N/A 5/3/2023    Procedure: COLONOSCOPY with random colon biopsy's;  Surgeon: Mitesh Tapia MD;  Location: [facility] ENDOSCOPY;  Service: Gastroenterology;  Laterality: N/A;    HYSTERECTOMY           Current Outpatient Medications:     amitriptyline (ELAVIL) 25 MG tablet, Take 1 tablet by mouth Every Night., Disp: , Rfl:     Calcium Carb-Cholecalciferol (Os-Arthur Calcium + D3) 500-200 MG-UNIT tablet, Take  by mouth., Disp: , Rfl:     calcium carbonate EX (TUMS EX) 750 MG chewable tablet, 0, Disp: , Rfl:      colestipol (COLESTID) 1 g tablet, Take 1 tablet by mouth 3 (Three) Times a Day., Disp: , Rfl:     cyclobenzaprine (FLEXERIL) 10 MG tablet, TAKE ONE TABLET 2 TIMES A DAY AS NEEDED, Disp: , Rfl:     denosumab (Prolia) 60 MG/ML solution prefilled syringe syringe, , Disp: , Rfl:     Diclofenac Sodium (VOLTAREN) 1 % gel gel, Apply 2 g topically to the appropriate area as directed., Disp: , Rfl:     diphenhydrAMINE-acetaminophen (TYLENOL PM)  MG tablet per tablet, Take  by mouth., Disp: , Rfl:     diphenoxylate-atropine (Lomotil) 2.5-0.025 MG per tablet, 240 tablets., Disp: , Rfl:     ferrous sulfate 324 (65 Fe) MG tablet delayed-release EC tablet, Take 1 tablet by mouth Daily With Breakfast., Disp: , Rfl:     K-Phos 500 MG tablet, Take 1 tablet by mouth 2 (Two) Times a Day., Disp: , Rfl:     letrozole (FEMARA) 2.5 MG tablet, Take 1 tablet by mouth Daily., Disp: 90 tablet, Rfl: 2    levothyroxine (SYNTHROID, LEVOTHROID) 50 MCG tablet, Take 1 tablet by mouth Daily., Disp: 30 tablet, Rfl: 1    lisinopril-hydrochlorothiazide (PRINZIDE,ZESTORETIC) 10-12.5 MG per tablet, Take 1 tablet by mouth Daily., Disp: , Rfl:     metroNIDAZOLE (FLAGYL) 500 MG tablet, TAKE 1 TABLET BY MOUTH THREE TIMES A DAY FOR 14 DAYS, Disp: , Rfl:     naproxen sodium (ANAPROX) 275 MG tablet, 0, Disp: , Rfl:     ondansetron (ZOFRAN) 4 MG tablet, Take 2 tablets by mouth., Disp: , Rfl:     pantoprazole (PROTONIX) 40 MG EC tablet, Take 1 tablet by mouth Every Morning., Disp: 30 tablet, Rfl: 1    rizatriptan MLT (MAXALT-MLT) 10 MG disintegrating tablet, Take 1 tablet by mouth Daily., Disp: , Rfl:     SUMAtriptan (IMITREX) 50 MG tablet, , Disp: , Rfl:     traMADol (ULTRAM) 50 MG tablet, Take 1 tablet by mouth Every 12 (Twelve) Hours As Needed for Moderate Pain., Disp: , Rfl:     cholestyramine light 4 g packet, Take 1 packet by mouth Every 12 (Twelve) Hours. (Patient not taking: Reported on 1/5/2024), Disp: 30 packet, Rfl: 1    saccharomyces boulardii  (FLORASTOR) 250 MG capsule, Take 1 capsule by mouth 2 (Two) Times a Day. (Patient not taking: Reported on 8/9/2024), Disp: 30 capsule, Rfl: 1  No current facility-administered medications for this visit.    Allergies   Allergen Reactions    Aspirin Nausea And Vomiting    Peanut-Containing Drug Products GI Intolerance    Penicillins GI Intolerance    Letrozole Rash       Family History   Problem Relation Age of Onset    Breast cancer Mother     Ovarian cancer Mother     Breast cancer Maternal Grandmother        Cancer-related family history includes Breast cancer in her maternal grandmother and mother; Ovarian cancer in her mother.    Social History     Tobacco Use    Smoking status: Former     Types: Cigarettes    Smokeless tobacco: Never   Vaping Use    Vaping status: Never Used   Substance Use Topics    Alcohol use: Yes     Comment: rarely    Drug use: Never       I have reviewed and confirmed the accuracy of the patient's history: Chief complaint, HPI, ROS, and Subjective as entered by the MA/LPN/RN. Taylor Mishra, APRN 08/09/24        SUBJECTIVE:  Camille is here today for her routine follow-up.  She reports that she is doing well.  She is compliant with her Femara and has no significant side effects.  She is compliant with monthly self breast exam and has not noticed any new changes.  She remains on her calcium and vitamin D and Prolia and has been doing well on those.  She has no new complaints today.    REVIEW OF SYSTEMS:    Review of Systems   Constitutional:  Negative for chills and fever.   HENT:  Negative for ear pain, mouth sores, nosebleeds and sore throat.    Eyes:  Negative for photophobia and visual disturbance.   Respiratory:  Negative for wheezing and stridor.    Cardiovascular:  Negative for chest pain and palpitations.   Gastrointestinal:  Negative for abdominal pain, diarrhea, nausea and vomiting.   Endocrine: Negative for cold intolerance and heat intolerance.   Genitourinary:  Negative  "for dysuria and hematuria.   Musculoskeletal:  Positive for arthralgias. Negative for joint swelling and neck stiffness.   Skin:  Negative for color change and rash.   Neurological:  Negative for seizures and syncope.   Hematological:  Negative for adenopathy.        No obvious bleeding   Psychiatric/Behavioral:  Negative for agitation, confusion and hallucinations.            OBJECTIVE:    Vitals:    08/09/24 1033   BP: 106/77   Pulse: 90   Temp: 98.4 °F (36.9 °C)   SpO2: 96%   Weight: 73.5 kg (162 lb)   Height: 165.1 cm (65\")   PainSc: 0-No pain         Body mass index is 26.96 kg/m².    ECOG    (0) Fully active, able to carry on all predisease performance without restriction    Physical Exam  Vitals and nursing note reviewed.   Constitutional:       General: She is not in acute distress.     Appearance: She is not diaphoretic.   HENT:      Head: Normocephalic and atraumatic.   Eyes:      General: No scleral icterus.        Right eye: No discharge.         Left eye: No discharge.      Conjunctiva/sclera: Conjunctivae normal.   Neck:      Thyroid: No thyromegaly.   Cardiovascular:      Rate and Rhythm: Normal rate and regular rhythm.      Heart sounds: Normal heart sounds.      No friction rub. No gallop.   Pulmonary:      Effort: Pulmonary effort is normal. No respiratory distress.      Breath sounds: No stridor. No wheezing.   Chest:      Chest wall: No mass.   Breasts:     Breasts are symmetrical.      Right: No swelling, mass, nipple discharge, skin change or tenderness.      Left: Normal. No swelling, mass, nipple discharge, skin change or tenderness.      Comments: Scar on the right breast in the upper outer quadrant from lumpectomy  Abdominal:      General: Bowel sounds are normal.      Palpations: Abdomen is soft. There is no mass.      Tenderness: There is no abdominal tenderness. There is no guarding or rebound.   Musculoskeletal:         General: No tenderness. Normal range of motion.      Cervical back: " Normal range of motion and neck supple.   Lymphadenopathy:      Cervical: No cervical adenopathy.   Skin:     General: Skin is warm.      Findings: No erythema or rash.   Neurological:      Mental Status: She is alert and oriented to person, place, and time.      Motor: No abnormal muscle tone.   Psychiatric:         Behavior: Behavior normal.       I have reexamined the patient and the results are consistent with the previously documented exam. CALEB Martinez        RECENT LABS    WBC   Date Value Ref Range Status   08/09/2024 5.13 3.40 - 10.80 10*3/mm3 Final     RBC   Date Value Ref Range Status   08/09/2024 4.09 3.77 - 5.28 10*6/mm3 Final     Hemoglobin   Date Value Ref Range Status   08/09/2024 12.9 12.0 - 15.9 g/dL Final     Hematocrit   Date Value Ref Range Status   08/09/2024 39.6 34.0 - 46.6 % Final     MCV   Date Value Ref Range Status   08/09/2024 96.8 79.0 - 97.0 fL Final     MCH   Date Value Ref Range Status   08/09/2024 31.5 26.6 - 33.0 pg Final     MCHC   Date Value Ref Range Status   08/09/2024 32.6 31.5 - 35.7 g/dL Final     RDW   Date Value Ref Range Status   08/09/2024 12.2 (L) 12.3 - 15.4 % Final     RDW-SD   Date Value Ref Range Status   08/09/2024 42.3 37.0 - 54.0 fl Final     MPV   Date Value Ref Range Status   08/09/2024 11.0 6.0 - 12.0 fL Final     Platelets   Date Value Ref Range Status   08/09/2024 163 140 - 450 10*3/mm3 Final     Neutrophil %   Date Value Ref Range Status   08/09/2024 48.5 42.7 - 76.0 % Final     Lymphocyte %   Date Value Ref Range Status   08/09/2024 32.2 19.6 - 45.3 % Final     Monocyte %   Date Value Ref Range Status   08/09/2024 14.8 (H) 5.0 - 12.0 % Final     Eosinophil %   Date Value Ref Range Status   08/09/2024 3.9 0.3 - 6.2 % Final     Basophil %   Date Value Ref Range Status   08/09/2024 0.6 0.0 - 1.5 % Final     Neutrophils, Absolute   Date Value Ref Range Status   08/09/2024 2.49 1.70 - 7.00 10*3/mm3 Final     Lymphocytes, Absolute   Date Value Ref  Range Status   08/09/2024 1.65 0.70 - 3.10 10*3/mm3 Final     Monocytes, Absolute   Date Value Ref Range Status   08/09/2024 0.76 0.10 - 0.90 10*3/mm3 Final     Eosinophils, Absolute   Date Value Ref Range Status   08/09/2024 0.20 0.00 - 0.40 10*3/mm3 Final     Basophils, Absolute   Date Value Ref Range Status   08/09/2024 0.03 0.00 - 0.20 10*3/mm3 Final     nRBC   Date Value Ref Range Status   05/04/2023 0.1 0.0 - 0.2 /100 WBC Final       Lab Results   Component Value Date    GLUCOSE 89 08/09/2024    BUN 21 (H) 08/09/2024    CREATININE 1.03 (H) 08/09/2024    EGFRIFNONA 62 07/21/2021    BCR 20.4 08/09/2024    K 3.8 08/09/2024    CO2 29.5 (H) 08/09/2024    CALCIUM 10.2 08/09/2024    ALBUMIN 4.5 08/09/2024    LABIL2 1.4 11/28/2017    AST 20 08/09/2024    ALT 12 08/09/2024         Assessment & Plan     Malignant neoplasm of female breast, unspecified estrogen receptor status, unspecified laterality, unspecified site of breast  - CBC & Differential  - Comprehensive Metabolic Panel  - DEXA Bone Density Axial  - Mammo Diagnostic Digital Tomosynthesis Bilateral With CAD    Aromatase inhibitor use  - DEXA Bone Density Axial  - Mammo Diagnostic Digital Tomosynthesis Bilateral With CAD    Osteopenia, unspecified location  - DEXA Bone Density Axial  - Mammo Diagnostic Digital Tomosynthesis Bilateral With CAD          ASSESSMENT:    Invasive ductal carcinoma of the right breast status post right lumpectomy with sentinel lymph node biopsy ER positive, NM positive, HER-2/birdie negative... T1b N0 M0.  Status post radiation therapy.  Ongoing management  Was on adjuvant endocrine therapy with tamoxifen discontinued due to memory issues  Could not tolerate Aromasin due to diarrhea  On Femara 2.5 mg April 20, 2021.  Patient will continue  Body aches and pains: We will further evaluate with bone scan.  This has been ordered.  Bone scan was negative for malignancy.  She will continue calcium with vitamin D  Memory issues secondary to  tamoxifen: This has been discontinued and her memory issues have resolved  Patient is now postmenopausal  Osteopenia: Reviewed bone density: On Prolia we will continue  New anemia, labs reviewed.  Anemia has resolved        Plans     CBC reviewed with patient   Continue Femara 2.5 mg p.o. daily  Reviewed her mammogram from October 2023.  Next mammogram due October 2024 and ordered today  Reviewed estradiol, serum FSH, CBC and CMP: Patient is now postmenopausal  Continue Prolia every 6 months-due today.  CMP, magnesium, phosphorus levels per routine  Reviewed DEXA scan from October 2022, will be again due October 2024.  Ordered today.  She will continue continue Os-Arthur D twice a day.  Reviewed.  Monthly breast self exams and call for lumps, nipple discharge, skin discoloration or breast pain  Follow-up 6 months with Dr. Harris or sooner if needed  Note given for work adjustments due to arthritic symptoms in her finger joints previously  Advised to follow-up with her PCP to further address her arthralgias and possible referral to specialist.  She is following.  All questions answered to the best of my abilities        Patient verbalized understanding and is in agreement of the above plan.     Thank you very much for allowing me to participate in the care of Sneha, I will keep you updated on her progress        I spent 30 minutes caring for Angy on this date of service. This time includes time spent by me in the following activities:preparing for the visit, reviewing tests, obtaining and/or reviewing a separately obtained history, performing a medically appropriate examination and/or evaluation , counseling and educating the patient/family/caregiver, ordering medications, tests, or procedures, and documenting information in the medical record

## 2024-10-04 ENCOUNTER — OFFICE (OUTPATIENT)
Age: 59
End: 2024-10-04

## 2024-10-04 ENCOUNTER — OFFICE (OUTPATIENT)
Dept: URBAN - METROPOLITAN AREA CLINIC 64 | Facility: CLINIC | Age: 59
End: 2024-10-04

## 2024-10-04 VITALS
WEIGHT: 159 LBS | DIASTOLIC BLOOD PRESSURE: 63 MMHG | HEART RATE: 90 BPM | HEART RATE: 90 BPM | HEIGHT: 65 IN | WEIGHT: 159 LBS | DIASTOLIC BLOOD PRESSURE: 63 MMHG | DIASTOLIC BLOOD PRESSURE: 63 MMHG | SYSTOLIC BLOOD PRESSURE: 85 MMHG | HEIGHT: 65 IN | SYSTOLIC BLOOD PRESSURE: 85 MMHG | WEIGHT: 159 LBS | DIASTOLIC BLOOD PRESSURE: 63 MMHG | HEIGHT: 65 IN | HEART RATE: 90 BPM | WEIGHT: 159 LBS | HEART RATE: 90 BPM | HEART RATE: 90 BPM | HEART RATE: 90 BPM | DIASTOLIC BLOOD PRESSURE: 63 MMHG | SYSTOLIC BLOOD PRESSURE: 85 MMHG | DIASTOLIC BLOOD PRESSURE: 63 MMHG | HEIGHT: 65 IN | HEIGHT: 65 IN | HEART RATE: 90 BPM | WEIGHT: 159 LBS | SYSTOLIC BLOOD PRESSURE: 85 MMHG | SYSTOLIC BLOOD PRESSURE: 85 MMHG | SYSTOLIC BLOOD PRESSURE: 85 MMHG | HEIGHT: 65 IN | DIASTOLIC BLOOD PRESSURE: 63 MMHG | HEIGHT: 65 IN | SYSTOLIC BLOOD PRESSURE: 85 MMHG | WEIGHT: 159 LBS | WEIGHT: 159 LBS

## 2024-10-04 DIAGNOSIS — R15.2 FECAL URGENCY: ICD-10-CM

## 2024-10-04 DIAGNOSIS — R19.7 DIARRHEA, UNSPECIFIED: ICD-10-CM

## 2024-10-04 DIAGNOSIS — K52.9 NONINFECTIVE GASTROENTERITIS AND COLITIS, UNSPECIFIED: ICD-10-CM

## 2024-10-04 PROCEDURE — 99213 OFFICE O/P EST LOW 20 MIN: CPT | Performed by: NURSE PRACTITIONER

## 2024-10-04 RX ORDER — DIPHENOXYLATE HYDROCHLORIDE AND ATROPINE SULFATE 2.5; .025 MG/1; MG/1
TABLET ORAL
Qty: 240 | Refills: 5 | Status: ACTIVE
Start: 2024-03-15

## 2024-10-04 RX ORDER — COLESTIPOL HYDROCHLORIDE 1 G/1
TABLET, FILM COATED ORAL
Qty: 270 | Refills: 4 | Status: ACTIVE

## 2024-10-18 ENCOUNTER — HOSPITAL ENCOUNTER (OUTPATIENT)
Dept: MAMMOGRAPHY | Facility: HOSPITAL | Age: 59
Discharge: HOME | End: 2024-10-18
Admitting: NURSE PRACTITIONER
Payer: COMMERCIAL

## 2024-10-18 DIAGNOSIS — M85.80 OSTEOPENIA, UNSPECIFIED LOCATION: ICD-10-CM

## 2024-10-18 DIAGNOSIS — Z79.811 AROMATASE INHIBITOR USE: ICD-10-CM

## 2024-10-18 DIAGNOSIS — C50.919 MALIGNANT NEOPLASM OF FEMALE BREAST, UNSPECIFIED ESTROGEN RECEPTOR STATUS, UNSPECIFIED LATERALITY, UNSPECIFIED SITE OF BREAST: ICD-10-CM

## 2024-10-18 PROCEDURE — G0279 TOMOSYNTHESIS, MAMMO: HCPCS

## 2024-10-18 PROCEDURE — 77066 DX MAMMO INCL CAD BI: CPT

## 2025-02-06 NOTE — PROGRESS NOTES
Hematology/Oncology Outpatient Follow Up    PATIENT NAME:Angy Davies  :1965  MRN: [id number]  PRIMARY CARE PHYSICIAN: Germán Spencer APRN  REFERRING PHYSICIAN: Germán Spencer APRN    Chief Complaint   Patient presents with    Follow-up     Malignant neoplasm of female breast, unspecified estrogen receptor status, unspecified laterality, unspecified site of breast            HISTORY OF PRESENT ILLNESS:     This a 57-year-old female who in 2018 was found to have stage I invasive ductal carcinoma of the right breast.  There underwent right lumpectomy followed by sentinel lymph node biopsy at [facility] on 2018.  Pathology showed grade 1 tubular carcinoma measuring 6.5 mm associated with some DCIS.  Margins were negative.  And lymph nodes were also negative pathologic stage was pT1b N0 M0.  Patient was then placed on adjuvant tamoxifen following radiation treatment.  She has been under the care of Dr. House.  Patient still that she developed progressive memory issues while on tamoxifen and had requested to have her treatments switched.  Review of Dr. House's note suggest that the patient did have hormonal levels for estradiol and FSH FSH was in the postmenopausal range but estradiol was still within perimenopausal range.  She was asked to continue tamoxifen with repeat hormonal levels 6 to 12 months.  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  She states she does not want to lose her job as she is fearful that tamoxifen effects will continue to be a problem.  She has been on tamoxifen since .  Her last mammogram was 2019.  She denies any breast lumps, nipple discharge or skin discoloration.  2/15/2019 she had a DEXA scan which showed osteopenia and patient is currently on calcium with vitamin D.  She also had intermittent abnormalities in her thyroid function testing.  Had a B12 level which was normal at  693    9/17/2020: Patient discontinued tamoxifen due to memory issues.  9/17/2020 patient had a chemistry panel which was actually unremarkable.  Estradiol was less than 5 which is in the postmenopausal range and FSH was 53 which is also in the post menopausal range white count was 7, hemoglobin 12.2 and platelets are 221.  Differentials where 69% neutrophils, 32% lymphocytes, there was no monocytosis eosinophilia or basophilia  9/29/2020 she had bilateral diagnostic mammogram which showed dense breast tissue.  But no evidence of malignancy was seen.  Follow-up in 1 year was recommended.  9/29/2020 patient had bone density which showed osteopenia  Patient discontinued Aromasin due to diarrhea  4/20/2021: Patient was placed on Femara 2.5 mg p.o. daily  September 2021 she had bilateral diagnostic mammogram which was negative  5/3/2022: Bone scan showing no malignant or metastatic disease  10/7/2022: Bilateral diagnostic mammogram negative for malignancy in either breast  10/7/2022: DEXA scan showing osteopenia  10/13/2023: Bilateral diagnostic mammogram showing no mammographic evidence of malignancy    Past Medical History:   Diagnosis Date    Allergic     Arthritis     Asthma     Breast cancer     Cataract     Chronic diarrhea     Headache     Hx of radiation therapy     Injury of back     Osteoporosis        Past Surgical History:   Procedure Laterality Date    BREAST BIOPSY      BREAST LUMPECTOMY      COLONOSCOPY N/A 5/3/2023    Procedure: COLONOSCOPY with random colon biopsy's;  Surgeon: Mitesh Tapia MD;  Location: [facility] ENDOSCOPY;  Service: Gastroenterology;  Laterality: N/A;    HYSTERECTOMY           Current Outpatient Medications:     albuterol sulfate  (90 Base) MCG/ACT inhaler, TAKE 2 PUFFS BY MOUTH EVERY 4 TO 6 HOURS AS NEEDED, Disp: , Rfl:     D3-50 1.25 MG (10191 UT) capsule, Take 1 capsule by mouth 1 (One) Time Per Week., Disp: , Rfl:     amitriptyline (ELAVIL) 25 MG tablet, Take 1 tablet by  mouth Every Night., Disp: , Rfl:     Calcium Carb-Cholecalciferol (Os-Arthur Calcium + D3) 500-200 MG-UNIT tablet, Take  by mouth., Disp: , Rfl:     calcium carbonate EX (TUMS EX) 750 MG chewable tablet, 0, Disp: , Rfl:     cholestyramine light 4 g packet, Take 1 packet by mouth Every 12 (Twelve) Hours. (Patient not taking: Reported on 2/7/2025), Disp: 30 packet, Rfl: 1    colestipol (COLESTID) 1 g tablet, Take 1 tablet by mouth 3 (Three) Times a Day., Disp: , Rfl:     cyclobenzaprine (FLEXERIL) 10 MG tablet, TAKE ONE TABLET 2 TIMES A DAY AS NEEDED, Disp: , Rfl:     denosumab (Prolia) 60 MG/ML solution prefilled syringe syringe, , Disp: , Rfl:     Diclofenac Sodium (VOLTAREN) 1 % gel gel, Apply 2 g topically to the appropriate area as directed., Disp: , Rfl:     diphenhydrAMINE-acetaminophen (TYLENOL PM)  MG tablet per tablet, Take  by mouth., Disp: , Rfl:     diphenoxylate-atropine (Lomotil) 2.5-0.025 MG per tablet, 240 tablets., Disp: , Rfl:     ferrous sulfate 324 (65 Fe) MG tablet delayed-release EC tablet, Take 1 tablet by mouth Daily With Breakfast., Disp: , Rfl:     K-Phos 500 MG tablet, Take 1 tablet by mouth 2 (Two) Times a Day., Disp: , Rfl:     letrozole (FEMARA) 2.5 MG tablet, Take 1 tablet by mouth Daily., Disp: 90 tablet, Rfl: 2    levothyroxine (SYNTHROID, LEVOTHROID) 50 MCG tablet, Take 1 tablet by mouth Daily., Disp: 30 tablet, Rfl: 1    lisinopril-hydrochlorothiazide (PRINZIDE,ZESTORETIC) 10-12.5 MG per tablet, Take 1 tablet by mouth Daily., Disp: , Rfl:     metroNIDAZOLE (FLAGYL) 500 MG tablet, TAKE 1 TABLET BY MOUTH THREE TIMES A DAY FOR 14 DAYS, Disp: , Rfl:     naproxen sodium (ANAPROX) 275 MG tablet, 0, Disp: , Rfl:     ondansetron (ZOFRAN) 4 MG tablet, Take 2 tablets by mouth., Disp: , Rfl:     pantoprazole (PROTONIX) 40 MG EC tablet, Take 1 tablet by mouth Every Morning., Disp: 30 tablet, Rfl: 1    rizatriptan MLT (MAXALT-MLT) 10 MG disintegrating tablet, Take 1 tablet by mouth Daily.,  Disp: , Rfl:     saccharomyces boulardii (FLORASTOR) 250 MG capsule, Take 1 capsule by mouth 2 (Two) Times a Day. (Patient not taking: Reported on 2/7/2025), Disp: 30 capsule, Rfl: 1    SUMAtriptan (IMITREX) 50 MG tablet, , Disp: , Rfl:     traMADol (ULTRAM) 50 MG tablet, Take 1 tablet by mouth Every 12 (Twelve) Hours As Needed for Moderate Pain., Disp: , Rfl:     Allergies   Allergen Reactions    Aspirin Nausea And Vomiting    Peanut-Containing Drug Products GI Intolerance    Penicillins GI Intolerance    Letrozole Rash       Family History   Problem Relation Age of Onset    Breast cancer Mother     Ovarian cancer Mother     Breast cancer Maternal Grandmother        Cancer-related family history includes Breast cancer in her maternal grandmother and mother; Ovarian cancer in her mother.    Social History     Tobacco Use    Smoking status: Former     Types: Cigarettes    Smokeless tobacco: Never   Vaping Use    Vaping status: Never Used   Substance Use Topics    Alcohol use: Yes     Comment: rarely    Drug use: Never     I have reviewed and confirmed the accuracy of the patient's history: Chief complaint, HPI, ROS, and Subjective as entered by the MA/LPN/RN. Julietteoziel Harris MD 02/07/25        SUBJECTIVE:    Patient is here  for routine follow-up.  Should be having left hand surgery in a few weeks.    REVIEW OF SYSTEMS:    Review of Systems   Constitutional:  Negative for chills and fever.   HENT:  Negative for ear pain, mouth sores, nosebleeds and sore throat.    Eyes:  Negative for photophobia and visual disturbance.   Respiratory:  Negative for wheezing and stridor.    Cardiovascular:  Negative for chest pain and palpitations.   Gastrointestinal:  Negative for abdominal pain, diarrhea, nausea and vomiting.   Endocrine: Negative for cold intolerance and heat intolerance.   Genitourinary:  Negative for dysuria and hematuria.   Musculoskeletal:  Positive for arthralgias. Negative for joint swelling and neck  "stiffness.   Skin:  Negative for color change and rash.   Neurological:  Negative for seizures and syncope.   Hematological:  Negative for adenopathy.        No obvious bleeding   Psychiatric/Behavioral:  Negative for agitation, confusion and hallucinations.            OBJECTIVE:    Vitals:    02/07/25 1124   BP: 114/80   Pulse: 84   Resp: 18   Temp: 97.6 °F (36.4 °C)   TempSrc: Infrared   SpO2: 98%   Weight: 73.2 kg (161 lb 6.4 oz)   Height: 165.1 cm (65\")   PainSc: 10-Worst pain ever   PainLoc: Hand           Body mass index is 26.86 kg/m².    ECOG    (0) Fully active, able to carry on all predisease performance without restriction    Physical Exam  Vitals and nursing note reviewed.   Constitutional:       General: She is not in acute distress.     Appearance: She is not diaphoretic.   HENT:      Head: Normocephalic and atraumatic.   Eyes:      General: No scleral icterus.        Right eye: No discharge.         Left eye: No discharge.      Conjunctiva/sclera: Conjunctivae normal.   Neck:      Thyroid: No thyromegaly.   Cardiovascular:      Rate and Rhythm: Normal rate and regular rhythm.      Heart sounds: Normal heart sounds.      No friction rub. No gallop.   Pulmonary:      Effort: Pulmonary effort is normal. No respiratory distress.      Breath sounds: No stridor. No wheezing.   Chest:      Chest wall: No mass.   Breasts:     Breasts are symmetrical.      Right: No swelling, mass, nipple discharge, skin change or tenderness.      Left: Normal. No swelling, mass, nipple discharge, skin change or tenderness.      Comments: Scar on the right breast in the upper outer quadrant from lumpectomy  Abdominal:      General: Bowel sounds are normal.      Palpations: Abdomen is soft. There is no mass.      Tenderness: There is no abdominal tenderness. There is no guarding or rebound.   Musculoskeletal:         General: No tenderness. Normal range of motion.      Cervical back: Normal range of motion and neck supple. "   Lymphadenopathy:      Cervical: No cervical adenopathy.   Skin:     General: Skin is warm.      Findings: No erythema or rash.   Neurological:      Mental Status: She is alert and oriented to person, place, and time.      Motor: No abnormal muscle tone.   Psychiatric:         Behavior: Behavior normal.       I have reexamined the patient and the results are consistent with the previously documented exam. Juliette Harris MD      RECENT LABS    WBC   Date Value Ref Range Status   02/07/2025 6.03 3.40 - 10.80 10*3/mm3 Final     RBC   Date Value Ref Range Status   02/07/2025 4.28 3.77 - 5.28 10*6/mm3 Final     Hemoglobin   Date Value Ref Range Status   02/07/2025 13.4 12.0 - 15.9 g/dL Final     Hematocrit   Date Value Ref Range Status   02/07/2025 40.3 34.0 - 46.6 % Final     MCV   Date Value Ref Range Status   02/07/2025 94.2 79.0 - 97.0 fL Final     MCH   Date Value Ref Range Status   02/07/2025 31.3 26.6 - 33.0 pg Final     MCHC   Date Value Ref Range Status   02/07/2025 33.3 31.5 - 35.7 g/dL Final     RDW   Date Value Ref Range Status   02/07/2025 11.9 (L) 12.3 - 15.4 % Final     RDW-SD   Date Value Ref Range Status   02/07/2025 40.1 37.0 - 54.0 fl Final     MPV   Date Value Ref Range Status   02/07/2025 9.4 6.0 - 12.0 fL Final     Platelets   Date Value Ref Range Status   02/07/2025 224 140 - 450 10*3/mm3 Final     Neutrophil %   Date Value Ref Range Status   02/07/2025 49.1 42.7 - 76.0 % Final     Lymphocyte %   Date Value Ref Range Status   02/07/2025 36.5 19.6 - 45.3 % Final     Monocyte %   Date Value Ref Range Status   02/07/2025 9.8 5.0 - 12.0 % Final     Eosinophil %   Date Value Ref Range Status   02/07/2025 4.1 0.3 - 6.2 % Final     Basophil %   Date Value Ref Range Status   02/07/2025 0.5 0.0 - 1.5 % Final     Neutrophils, Absolute   Date Value Ref Range Status   02/07/2025 2.96 1.70 - 7.00 10*3/mm3 Final     Lymphocytes, Absolute   Date Value Ref Range Status   02/07/2025 2.20 0.70 - 3.10  10*3/mm3 Final     Monocytes, Absolute   Date Value Ref Range Status   02/07/2025 0.59 0.10 - 0.90 10*3/mm3 Final     Eosinophils, Absolute   Date Value Ref Range Status   02/07/2025 0.25 0.00 - 0.40 10*3/mm3 Final     Basophils, Absolute   Date Value Ref Range Status   02/07/2025 0.03 0.00 - 0.20 10*3/mm3 Final     nRBC   Date Value Ref Range Status   05/04/2023 0.1 0.0 - 0.2 /100 WBC Final       Lab Results   Component Value Date    GLUCOSE 89 08/09/2024    BUN 21 (H) 08/09/2024    CREATININE 1.03 (H) 08/09/2024    EGFRIFNONA 62 07/21/2021    BCR 20.4 08/09/2024    K 3.8 08/09/2024    CO2 29.5 (H) 08/09/2024    CALCIUM 10.2 08/09/2024    ALBUMIN 4.5 08/09/2024    LABIL2 1.4 11/28/2017    AST 20 08/09/2024    ALT 12 08/09/2024         Assessment & Plan     Malignant neoplasm of female breast, unspecified estrogen receptor status, unspecified laterality, unspecified site of breast  - CBC & Differential  - Comprehensive Metabolic Panel  - DEXA Bone Density Axial    Aromatase inhibitor use  - DEXA Bone Density Axial    Osteopenia, unspecified location  - DEXA Bone Density Axial            ASSESSMENT:    Invasive ductal carcinoma of the right breast status post right lumpectomy with sentinel lymph node biopsy ER positive, ID positive, HER-2/birdie negative... T1b N0 M0.  Status post radiation therapy.  Ongoing management  Was on adjuvant endocrine therapy with tamoxifen discontinued due to memory issues  Could not tolerate Aromasin due to diarrhea  On Femara 2.5 mg April 20, 2021.  Patient will Continue  Body aches and pains: We will further evaluate with bone scan.  This has been ordered.  Bone scan was negative for malignancy.  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  Anemia has resolved        Plans     CBC  reviewed with patient   Continue  Femara 2.5 mg p.o. daily  Reviewed her mammogram from October 2023.  Next mammogram due October 2024 and ordered today  Reviewed estradiol, serum FSH, CBC and CMP: Patient is now postmenopausal  Continue   Prolia every 6 months-due today.  CMP, magnesium, phosphorus levels per routine  Reviewed DEXA scan from October 2022, will be again due October 2024.  This has been reordered  She will continue continue Os-Arthur D twice a day.  Reviewed.  Monthly breast self exams and call for lumps, nipple discharge, skin discoloration or breast pain  FU in 6 months  Note given for work adjustments due to arthritic symptoms in her finger joints previously  Advised to follow-up with her PCP to further address her arthralgias and possible referral to specialist.  She is following.  All questions answered to the best of my abilities        Patient verbalized understanding and is in agreement of the above plan.     Thank you very much for allowing me to participate in the care of Sneha, I will keep you updated on her progress       Electronically signed by Juliette Harris MD, 02/07/25, 3:09 PM EST.

## 2025-02-07 ENCOUNTER — OFFICE (OUTPATIENT)
Dept: URBAN - METROPOLITAN AREA CLINIC 64 | Facility: CLINIC | Age: 60
End: 2025-02-07

## 2025-02-07 ENCOUNTER — OFFICE VISIT (OUTPATIENT)
Dept: ONCOLOGY | Facility: CLINIC | Age: 60
End: 2025-02-07
Payer: COMMERCIAL

## 2025-02-07 ENCOUNTER — LAB (OUTPATIENT)
Dept: LAB | Facility: HOSPITAL | Age: 60
End: 2025-02-07
Payer: COMMERCIAL

## 2025-02-07 ENCOUNTER — APPOINTMENT (OUTPATIENT)
Dept: ONCOLOGY | Facility: HOSPITAL | Age: 60
End: 2025-02-07
Payer: COMMERCIAL

## 2025-02-07 VITALS
HEIGHT: 65 IN | SYSTOLIC BLOOD PRESSURE: 114 MMHG | HEART RATE: 84 BPM | RESPIRATION RATE: 18 BRPM | BODY MASS INDEX: 26.89 KG/M2 | OXYGEN SATURATION: 98 % | WEIGHT: 161.4 LBS | DIASTOLIC BLOOD PRESSURE: 80 MMHG | TEMPERATURE: 97.6 F

## 2025-02-07 DIAGNOSIS — C50.919 MALIGNANT NEOPLASM OF FEMALE BREAST, UNSPECIFIED ESTROGEN RECEPTOR STATUS, UNSPECIFIED LATERALITY, UNSPECIFIED SITE OF BREAST: Primary | ICD-10-CM

## 2025-02-07 DIAGNOSIS — Z79.811 AROMATASE INHIBITOR USE: ICD-10-CM

## 2025-02-07 DIAGNOSIS — M85.80 OSTEOPENIA, UNSPECIFIED LOCATION: ICD-10-CM

## 2025-02-07 LAB
ALBUMIN SERPL-MCNC: 4.6 G/DL (ref 3.5–5.2)
ALBUMIN/GLOB SERPL: 1.8 G/DL
ALP SERPL-CCNC: 68 U/L (ref 39–117)
ALT SERPL W P-5'-P-CCNC: 14 U/L (ref 1–33)
ANION GAP SERPL CALCULATED.3IONS-SCNC: 8.3 MMOL/L (ref 5–15)
AST SERPL-CCNC: 19 U/L (ref 1–32)
BASOPHILS # BLD AUTO: 0.03 10*3/MM3 (ref 0–0.2)
BASOPHILS NFR BLD AUTO: 0.5 % (ref 0–1.5)
BILIRUB SERPL-MCNC: 0.3 MG/DL (ref 0–1.2)
BUN SERPL-MCNC: 22 MG/DL (ref 6–20)
BUN/CREAT SERPL: 22.2 (ref 7–25)
CALCIUM SPEC-SCNC: 10.2 MG/DL (ref 8.6–10.5)
CHLORIDE SERPL-SCNC: 101 MMOL/L (ref 98–107)
CO2 SERPL-SCNC: 31.7 MMOL/L (ref 22–29)
CREAT SERPL-MCNC: 0.99 MG/DL (ref 0.57–1)
DEPRECATED RDW RBC AUTO: 40.1 FL (ref 37–54)
EGFRCR SERPLBLD CKD-EPI 2021: 65.8 ML/MIN/1.73
EOSINOPHIL # BLD AUTO: 0.25 10*3/MM3 (ref 0–0.4)
EOSINOPHIL NFR BLD AUTO: 4.1 % (ref 0.3–6.2)
ERYTHROCYTE [DISTWIDTH] IN BLOOD BY AUTOMATED COUNT: 11.9 % (ref 12.3–15.4)
GLOBULIN UR ELPH-MCNC: 2.6 GM/DL
GLUCOSE SERPL-MCNC: 97 MG/DL (ref 65–99)
HCT VFR BLD AUTO: 40.3 % (ref 34–46.6)
HGB BLD-MCNC: 13.4 G/DL (ref 12–15.9)
HOLD SPECIMEN: NORMAL
HOLD SPECIMEN: NORMAL
LYMPHOCYTES # BLD AUTO: 2.2 10*3/MM3 (ref 0.7–3.1)
LYMPHOCYTES NFR BLD AUTO: 36.5 % (ref 19.6–45.3)
MCH RBC QN AUTO: 31.3 PG (ref 26.6–33)
MCHC RBC AUTO-ENTMCNC: 33.3 G/DL (ref 31.5–35.7)
MCV RBC AUTO: 94.2 FL (ref 79–97)
MONOCYTES # BLD AUTO: 0.59 10*3/MM3 (ref 0.1–0.9)
MONOCYTES NFR BLD AUTO: 9.8 % (ref 5–12)
NEUTROPHILS NFR BLD AUTO: 2.96 10*3/MM3 (ref 1.7–7)
NEUTROPHILS NFR BLD AUTO: 49.1 % (ref 42.7–76)
PLATELET # BLD AUTO: 224 10*3/MM3 (ref 140–450)
PMV BLD AUTO: 9.4 FL (ref 6–12)
POTASSIUM SERPL-SCNC: 3.9 MMOL/L (ref 3.5–5.2)
PROT SERPL-MCNC: 7.2 G/DL (ref 6–8.5)
RBC # BLD AUTO: 4.28 10*6/MM3 (ref 3.77–5.28)
SODIUM SERPL-SCNC: 141 MMOL/L (ref 136–145)
WBC NRBC COR # BLD AUTO: 6.03 10*3/MM3 (ref 3.4–10.8)

## 2025-02-07 PROCEDURE — 36415 COLL VENOUS BLD VENIPUNCTURE: CPT

## 2025-02-07 PROCEDURE — 85025 COMPLETE CBC W/AUTO DIFF WBC: CPT

## 2025-02-07 PROCEDURE — 80053 COMPREHEN METABOLIC PANEL: CPT | Performed by: INTERNAL MEDICINE

## 2025-02-07 RX ORDER — METHOCARBAMOL 750 MG/1
1 TABLET ORAL WEEKLY
COMMUNITY
Start: 2024-12-10

## 2025-02-07 RX ORDER — ALBUTEROL SULFATE 90 UG/1
INHALANT RESPIRATORY (INHALATION)
COMMUNITY
Start: 2024-12-14

## 2025-06-02 NOTE — PLAN OF CARE
Problem: Adult Inpatient Plan of Care  Goal: Plan of Care Review  Outcome: Ongoing, Progressing  Flowsheets (Taken 5/1/2023 1879)  Progress: no change  Plan of Care Reviewed With: patient  Outcome Evaluation: new admit  Goal: Patient-Specific Goal (Individualized)  Outcome: Ongoing, Progressing  Goal: Absence of Hospital-Acquired Illness or Injury  Outcome: Ongoing, Progressing  Goal: Optimal Comfort and Wellbeing  Outcome: Ongoing, Progressing  Goal: Readiness for Transition of Care  Outcome: Ongoing, Progressing   Goal Outcome Evaluation:  Plan of Care Reviewed With: patient        Progress: no change  Outcome Evaluation: new admit   Review of Systems Health Update:   What is your biggest concern for today's visit? none    GENERAL / CONSTITUTIONAL:  []  YES    [x]  NO   Excessive fatigue.  []  YES    [x]  NO   Unexplained weight loss   []  YES    [x]  NO   Have you traveled outside of the [place] in the past year?   If so, where:     EARS, NOSE, MOUTH AND THROAT:  []  YES    [x]  NO   Hoarseness or voice change.  []  YES    [x]  NO   Difficult or painful swallowing.    HEART:  []  YES    [x]  NO   Chest pain  []  YES    [x]  NO   Palpitation or irregular heart beat.    LUNGS:   []  YES    [x]  NO   Coughing up blood.   []  YES    [x]  NO   Chronic cough.  []  YES    [x]  NO   Wheezing.  []  YES    [x]  NO   Shortness of Breath    INTESTINAL SYSTEM:  []  YES    [x]  NO   Tarry (black) stools.  []  YES    [x]  NO   Recurrent abdominal pain.  []  YES    [x]  NO   Frequent nausea or vomiting.    URINARY SYSTEM:   []  YES    [x]  NO   Difficult or painful urination.  []  YES    [x]  NO   Urination more than once a night.  []  YES    [x]  NO   Bloody Urine    SKELETON AND JOINTS:  []  YES    [x]  NO   Swollen or painful joints.   []  YES    [x]  NO   Gout.   Which joints:     SKIN:  []  YES    [x]  NO   Recurrent skin rash.   []  YES    [x]  NO   Moles that have changed in size or color.    NERVOUS SYSTEM:   []  YES    [x]  NO   Frequent or severe headaches.  []  YES    [x]  NO   Loss of consciousness/concussion.  []  YES    [x]  NO   Weakness or recurrent numbness or tingling in your arms or legs.    PSYCHIATRIC:  Depression Screening  Over the last two weeks, how often have you been bothered by any of the following problems?  []  YES    [x]  NO   Little interest or pleasure in doing things.    []  YES    [x]  NO   Feeling down, depressed or hopeless.   []  YES    [x]  NO   Feeling nervous, anxious or on edge.  []  YES    [x]  NO   Not being able to stop or control worrying.     ENDOCRINE:  []  YES    [x]  NO   History of diabetes.  []  YES    [x]  NO    History of thyroid disease.     HEMATOLOGIC:   []  YES    [x]  NO   Swollen glands or lymph nodes.  []  YES    [x]  NO   History of anemia.   []  YES    [x]  NO   History of blood clots.    IMMUNE SYSTEM:  []  YES    [x]  NO   History of AIDS.  []  YES    [x]  NO   Asthma.    FEMALE HEALTH UPDATE:  []  YES    [x]  NO   Any problems with irregular menstrual periods, painful periods or spotting.  Approximate date of last menstrual period:     How far apart are your periods:     How many days do you flow?     Amount of flow:  Scant []     Moderate  []    Heavy  []   Number of pregnancies:    Number of living children:    []  YES    [x]  NO   Are you trying to get pregnant?   []  YES    [x]  NO   Do you use birth control (including tubal or partner with vasectomy)?   If yes, what type:    []  YES    [x]  NO   Have you had an abnormal cervical pap smear?  []  YES    [x]  NO   Have you had a hysterectomy?    LIFESTYLE HABITS:    []  YES    [x]  NO   Do you drink alcohol?   Quantity consumed per week on average: Beer  Drinks   []  YES    [x]  NO   In the past year have you ever drank or used drugs more than you meant to?  []  YES    [x]  NO   Have you felt you wanted or needed to cut down on your drinking or drug use in the past year?  []  YES    [x]  NO   Have you been annoyed by friends or family that suggested you cut back on your drinking or use of drugs?  []  YES    [x]  NO   Have you ever shared hypodermic needles?   []  YES    [x]  NO   Have you used street drugs in the past 2 years?   How many days per week do you exercise for 30 minutes or more:   []  YES    [x]  NO   Do you smoke?   If you are a former smoker when did you quit?    If you smoke, how many packs per day?    []  YES    [x]  NO   Are you interested in and/or ready to quit smoking?  [x]  YES    []  NO   Do you wear your seatbelt?    []  YES    [x]  NO  []  PAST   Hormonal therapy  []  YES    [x]  NO   Do you have concerns about your sexual practices  that you wish to discuss?  []  YES    [x]  NO   Do you want to be screened for AIDS?     SCREENING FOR INTIMATE PARTNER VIOLENCE:  How often does your partner physically hurt you? 1  How often does your partner insult or talk down to you? 1  How often does your partner threaten you with physical harm? 1  How often does your partner scream at you? 1  []  YES    [x]  NO   Do you currently feel safe in your present living situation?

## 2025-07-07 DIAGNOSIS — C50.919 MALIGNANT NEOPLASM OF FEMALE BREAST, UNSPECIFIED ESTROGEN RECEPTOR STATUS, UNSPECIFIED LATERALITY, UNSPECIFIED SITE OF BREAST: ICD-10-CM

## 2025-07-07 RX ORDER — LETROZOLE 2.5 MG/1
2.5 TABLET, FILM COATED ORAL DAILY
Qty: 90 TABLET | Refills: 2 | Status: SHIPPED | OUTPATIENT
Start: 2025-07-07 | End: 2025-07-09 | Stop reason: SDUPTHER

## 2025-07-09 DIAGNOSIS — C50.919 MALIGNANT NEOPLASM OF FEMALE BREAST, UNSPECIFIED ESTROGEN RECEPTOR STATUS, UNSPECIFIED LATERALITY, UNSPECIFIED SITE OF BREAST: ICD-10-CM

## 2025-07-09 RX ORDER — LETROZOLE 2.5 MG/1
2.5 TABLET, FILM COATED ORAL DAILY
Qty: 90 TABLET | Refills: 2 | Status: SHIPPED | OUTPATIENT
Start: 2025-07-09

## 2025-08-19 ENCOUNTER — TELEPHONE (OUTPATIENT)
Dept: ONCOLOGY | Facility: CLINIC | Age: 60
End: 2025-08-19

## (undated) DEVICE — SINGLE-USE BIOPSY FORCEPS: Brand: RADIAL JAW 4

## (undated) DEVICE — PK ENDO GI 50